# Patient Record
Sex: FEMALE | Race: WHITE | NOT HISPANIC OR LATINO | Employment: PART TIME | URBAN - METROPOLITAN AREA
[De-identification: names, ages, dates, MRNs, and addresses within clinical notes are randomized per-mention and may not be internally consistent; named-entity substitution may affect disease eponyms.]

---

## 2018-01-11 ENCOUNTER — OFFICE VISIT (OUTPATIENT)
Dept: FAMILY MEDICINE | Facility: CLINIC | Age: 33
End: 2018-01-11
Payer: COMMERCIAL

## 2018-01-11 VITALS
OXYGEN SATURATION: 100 % | TEMPERATURE: 98.7 F | SYSTOLIC BLOOD PRESSURE: 138 MMHG | WEIGHT: 266.6 LBS | HEART RATE: 74 BPM | HEIGHT: 67 IN | BODY MASS INDEX: 41.84 KG/M2 | DIASTOLIC BLOOD PRESSURE: 92 MMHG

## 2018-01-11 DIAGNOSIS — R59.0 AXILLARY LYMPHADENOPATHY: Primary | ICD-10-CM

## 2018-01-11 DIAGNOSIS — R20.9 DISTURBANCE OF SKIN SENSATION: ICD-10-CM

## 2018-01-11 PROCEDURE — 99203 OFFICE O/P NEW LOW 30 MIN: CPT | Performed by: NURSE PRACTITIONER

## 2018-01-11 NOTE — MR AVS SNAPSHOT
After Visit Summary   1/11/2018    Kelsea Murray    MRN: [id number]           Patient Information     Date Of Birth          1985        Visit Information        Provider Department      1/11/2018 4:00 PM Geneva Horne APRN CNP [facility]        Today's Diagnoses     Axillary lymphadenopathy    -  1      Care Instructions    Schedule your ultrasound in the next 1-2 weeks and we will discuss the results I receive them.            Follow-ups after your visit        Your next 10 appointments already scheduled     Jan 23, 2018  4:40 PM CST   [facility]YS[facility] with WAN Lacey C[facility]    [address] [address] [id number] [phone]              Future tests that were ordered for you today     Open Future Orders        Priority Expected Expires Ordered    US Breast Bilateral Complete 4 Quadrants Routine  1/11/2019 1/11/2018            Who to contact     If you have questions or need follow up information about today's clinic visit or your schedule please contact [facility] directly at [phone].  Normal or non-critical lab and imaging results will be communicated to you by iSOCOhart, letter or phone within 4 business days after the clinic has received the results. If you do not hear from us within 7 days, please contact the clinic through iSOCOhart or phone. If you have a critical or abnormal lab result, we will notify you by phone as soon as possible.  Submit refill requests through Mimosa or call your pharmacy and they will forward the refill request to us. Please allow 3 business days for your refill to be completed.          Additional Information About Your Visit        Mimosa Information     Mimosa lets you send messages to your doctor, view your test results, renew your prescriptions, schedule appointments and more. To sign up, go to  "[email or web address] . Click on \"Log in\" on the left side of the screen, which will take you to the Welcome page. Then click on \"Sign up Now\" on the right side of the page.     You will be asked to enter the access code listed below, as well as some personal information. Please follow the directions to create your username and password.     Your access code is: [email or web address]  Expires: 2018 10:29 AM     Your access code will  in 90 days. If you need help or a new code, please call your [facility] clinic or [phone].        Care EveryWhere ID     This is your Care EveryWhere ID. This could be used by other organizations to access your [facility] medical records  [id number]        Your Vitals Were     Pulse Temperature Height Last Period Pulse Oximetry BMI (Body Mass Index)    74 98.7  F (37.1  C) (Oral) 5' 7.16\" (1.706 m) 2017 (Within Days) 100% 41.55 kg/m2       Blood Pressure from Last 3 Encounters:   18 (!) 138/92    Weight from Last 3 Encounters:   18 266 lb 9.6 oz (120.9 kg)               Primary Care Provider Fax #    Physician No Ref-Primary [phone]       No address on file        Equal Access to Services     PURVI DELANEY : Abby hoango Soomaali, waaxda luqadaha, qaybta kaalmada ademonico, jayne salazar. So [facility] [phone].    ATENCIÓN: Si habla español, tiene a wick disposición servicios gratuitos de asistencia lingüística. Llame al [phone].    We comply with applicable federal civil rights laws and [place] laws. We do not discriminate on the basis of race, color, national origin, age, disability, sex, sexual orientation, or gender identity.            Thank you!     Thank you for choosing [facility]  for your care. Our goal is always to provide you with excellent care. Hearing back from our patients is one way we can continue to improve our services. Please take a few minutes to complete the written survey that " you may receive in the mail after your visit with us. Thank you!             Your Updated Medication List - Protect others around you: Learn how to safely use, store and throw away your medicines at www.disposemymeds.org.      Notice  As of 1/11/2018  4:12 PM    You have not been prescribed any medications.

## 2018-01-11 NOTE — PROGRESS NOTES
SUBJECTIVE:   Kelsea Murray is a 32 year old female who presents to clinic today for the following health issues:  Concern - left axillary   Onset: 5-6 months    Description:   Left axillary region numbness or almost tingly     Intensity: mild    Progression of Symptoms:  same    Accompanying Signs & Symptoms:  none    Previous history of similar problem:   none    Precipitating factors:   Worsened by: nothing    Alleviating factors:  Improved by: nothing  When was shaving one day noticed that it felt different  Therapies Tried and outcome: nothing    Patient has been busy so did not come in about this.  She is now very worried.  She denies breast lumps, tenderness, or skin changes.  No nipple tenderness or discharge.  No possibility for pregnancy, she is on birth control.  She denies family history of female cancers except for her maternal grandmother who was diagnosed in 70s.  + personal history of anxiety.    Problem list and histories reviewed & adjusted, as indicated.  Additional history: as documented    There is no problem list on file for this patient.    History reviewed. No pertinent surgical history.    Social History   Substance Use Topics     Smoking status: Never Smoker     Smokeless tobacco: Never Used     Alcohol use Yes     Family History   Problem Relation Age of Onset     Breast Cancer Maternal Grandmother      Dx in 70s         No current outpatient prescriptions on file.     No Known Allergies  No lab results found.   BP Readings from Last 3 Encounters:   01/11/18 (!) 138/92    Wt Readings from Last 3 Encounters:   01/11/18 266 lb 9.6 oz (120.9 kg)                  Labs reviewed in EPIC          Reviewed and updated as needed this visit by clinical staff     Reviewed and updated as needed this visit by Provider         ROS:  Constitutional, HEENT, cardiovascular, pulmonary, gi and gu systems are negative, except as otherwise noted.      OBJECTIVE:   BP (!) 138/92 (BP Location: Left arm,  "Patient Position: Chair, Cuff Size: Adult Large)  Pulse 74  Temp 98.7  F (37.1  C) (Oral)  Ht 5' 7.16\" (1.706 m)  Wt 266 lb 9.6 oz (120.9 kg)  LMP 12/05/2017 (Within Days)  SpO2 100%  BMI 41.55 kg/m2  Body mass index is 41.55 kg/(m^2).  GENERAL: healthy, alert and no distress  NECK: no adenopathy, no asymmetry, masses, or scars and thyroid normal to palpation  RESP: lungs clear to auscultation - no rales, rhonchi or wheezes  BREAST: normal without masses, tenderness or nipple discharge and axillary findings: axillary adenopathy on bilateral axillae though more numerous on left (5-6 palpated on left and 2-3 on right).  They are not painful or mobile.  Slightly firm. All about 0.3 cm in diameter.  CV: regular rate and rhythm, normal S1 S2, no S3 or S4, no murmur, click or rub, no peripheral edema and peripheral pulses strong  ABDOMEN: soft, nontender, no hepatosplenomegaly, no masses and bowel sounds normal  MS: no gross musculoskeletal defects noted, no edema  LYMPH: normal ant/post cervical, supraclavicular nodes      Diagnostic Test Results:  none     ASSESSMENT/PLAN:     1. Axillary lymphadenopathy  With numbness, will image.  - US Breast Bilateral Complete 4 Quadrants; Future    2. Disturbance of skin sensation  On left axillae as above.      Patient Instructions   Schedule your ultrasound in the next 1-2 weeks and we will discuss the results I receive them.        WAN Gutiérrez [facility]  "

## 2018-01-15 PROBLEM — R20.9 DISTURBANCE OF SKIN SENSATION: Status: ACTIVE | Noted: 2018-01-15

## 2018-01-15 PROBLEM — E66.01 OBESITY, MORBID, BMI 40.0-49.9 (H): Status: ACTIVE | Noted: 2018-01-15

## 2018-01-15 PROBLEM — R59.0 AXILLARY LYMPHADENOPATHY: Status: ACTIVE | Noted: 2018-01-15

## 2018-01-16 ENCOUNTER — RADIANT APPOINTMENT (OUTPATIENT)
Dept: ULTRASOUND IMAGING | Facility: CLINIC | Age: 33
End: 2018-01-16
Attending: NURSE PRACTITIONER
Payer: COMMERCIAL

## 2018-01-16 ENCOUNTER — RADIANT APPOINTMENT (OUTPATIENT)
Dept: MAMMOGRAPHY | Facility: CLINIC | Age: 33
End: 2018-01-16
Attending: NURSE PRACTITIONER
Payer: COMMERCIAL

## 2018-01-16 DIAGNOSIS — R59.0 AXILLARY LYMPHADENOPATHY: ICD-10-CM

## 2018-01-16 PROCEDURE — 77066 DX MAMMO INCL CAD BI: CPT | Performed by: RADIOLOGY

## 2018-01-16 PROCEDURE — 76642 ULTRASOUND BREAST LIMITED: CPT | Mod: LT | Performed by: RADIOLOGY

## 2018-01-30 ENCOUNTER — OFFICE VISIT (OUTPATIENT)
Dept: FAMILY MEDICINE | Facility: CLINIC | Age: 33
End: 2018-01-30
Payer: COMMERCIAL

## 2018-01-30 VITALS
OXYGEN SATURATION: 100 % | WEIGHT: 266 LBS | SYSTOLIC BLOOD PRESSURE: 124 MMHG | BODY MASS INDEX: 41.75 KG/M2 | DIASTOLIC BLOOD PRESSURE: 88 MMHG | TEMPERATURE: 96.7 F | HEART RATE: 78 BPM | HEIGHT: 67 IN

## 2018-01-30 DIAGNOSIS — M62.838 NECK MUSCLE SPASM: Primary | ICD-10-CM

## 2018-01-30 DIAGNOSIS — E66.01 OBESITY, MORBID, BMI 40.0-49.9 (H): ICD-10-CM

## 2018-01-30 PROCEDURE — 99214 OFFICE O/P EST MOD 30 MIN: CPT | Performed by: PREVENTIVE MEDICINE

## 2018-01-30 RX ORDER — METHOCARBAMOL 500 MG/1
1000 TABLET, FILM COATED ORAL 3 TIMES DAILY PRN
Qty: 30 TABLET | Refills: 1 | Status: SHIPPED | OUTPATIENT
Start: 2018-01-30 | End: 2018-08-14

## 2018-01-30 ASSESSMENT — PAIN SCALES - GENERAL: PAINLEVEL: SEVERE PAIN (6)

## 2018-01-30 NOTE — MR AVS SNAPSHOT
After Visit Summary   1/30/2018    Kelsea Murray    MRN: [id number]           Patient Information     Date Of Birth          1985        Visit Information        Provider Department      1/30/2018 8:00 AM Skylar Coles MD [facility]        Today's Diagnoses     Neck muscle spasm    -  1      Care Instructions    At [facility], we strive to deliver an exceptional experience to you, every time we see you.  If you receive a survey in the mail, please send us back your thoughts. We really do value your feedback.    Based on your medical history, these are the current health maintenance/preventive care services that you are due for (some may have been done at this visit.)  Health Maintenance Due   Topic Date Due     INFLUENZA VACCINE (SYSTEM ASSIGNED)  09/01/2017         Suggested websites for health information:  Www.Chu Shu : Up to date and easily searchable information on multiple topics.  Www.Whiphand.gov : medication info, interactive tutorials, watch real surgeries online  Www.familydoctor.org : good info from the Academy of Family Physicians  Www.cdc.gov : public health info, travel advisories, epidemics (H1N1)  Www.aap.org : children's health info, normal development, vaccinations  Www.health.state[place] : [place] dept of health, public health issues in [place], N1N1    Your care team:                            Family Medicine Internal Medicine   MD Milton White MD Shantel Branch-Fleming, MD Katya Georgiev PA-C Megan Hill, WAN Ferreira MD Pediatrics   DAO Cheng, PEDRO [place] APRN CNP   MD Marie Shah MD Deborah Mielke, MD Kim Thein, APRN [facility]      Clinic hours: Monday - Thursday 7 am-7 pm; Fridays 7 am-5 pm.   Urgent care: Monday - Friday 11 am-9 pm; Saturday and Sunday 9 am-5 pm.  Pharmacy : Monday -Thursday 8 am-8 pm; Friday 8 am-6 pm; Saturday and Sunday 9 am-5 pm.      Clinic: [phone]   Pharmacy: [phone]              Follow-ups after your visit        Additional Services     [facility] PT, HAND, AND CHIROPRACTIC REFERRAL       **This order will print in the [facility] Scheduling Office**    Physical Therapy, Hand Therapy and Chiropractic Care are available through:    *[facility]  *[facility]  *[facility]    Call one number to schedule at any of the above locations: [phone].    Your provider has referred you to: As Indicated:     Indication/Reason for Referral: Neck Pain  Onset of Illness: 2 weeks   Therapy Orders: Evaluate and Treat  Special Programs: None  Special Request: None    Nadia Zuniga      Additional Comments for the Therapist or Chiropractor:     Please be aware that coverage of these services is subject to the terms and limitations of your health insurance plan.  Call member services at your health plan with any benefit or coverage questions.      Please bring the following to your appointment:    *Your personal calendar for scheduling future appointments  *Comfortable clothing                  Who to contact     If you have questions or need follow up information about today's clinic visit or your schedule please contact [facility] directly at [phone].  Normal or non-critical lab and imaging results will be communicated to you by MyChart, letter or phone within 4 business days after the clinic has received the results. If you do not hear from us within 7 days, please contact the clinic through NightstaRxhart or phone. If you have a critical or abnormal lab result, we will notify you by phone as soon as possible.  Submit refill requests through "[employer] or call your pharmacy and they will forward the refill request to us. Please allow 3 business days for your refill to be completed.          Additional Information About Your Visit        NightstaRxharSpottly Information     "[employer] gives you  "secure access to your electronic health record. If you see a primary care provider, you can also send messages to your care team and make appointments. If you have questions, please call your primary care clinic.  If you do not have a primary care provider, please call [phone] and they will assist you.        Care EveryWhere ID     This is your Care EveryWhere ID. This could be used by other organizations to access your [facility] medical records  [id number]        Your Vitals Were     Pulse Temperature Height Last Period Pulse Oximetry Breastfeeding?    78 96.7  F (35.9  C) (Oral) 5' 7\" (1.702 m) 01/29/2018 100% No    BMI (Body Mass Index)                   41.66 kg/m2            Blood Pressure from Last 3 Encounters:   01/30/18 124/88   01/11/18 (!) 138/92    Weight from Last 3 Encounters:   01/30/18 266 lb (120.7 kg)   01/11/18 266 lb 9.6 oz (120.9 kg)              We Performed the Following     [facility] PT, HAND, AND CHIROPRACTIC REFERRAL          Today's Medication Changes          These changes are accurate as of 1/30/18  8:27 AM.  If you have any questions, ask your nurse or doctor.               Start taking these medicines.        Dose/Directions    methocarbamol 500 MG tablet   Commonly known as:  ROBAXIN   Used for:  Neck muscle spasm   Started by:  Skylar Coles MD        Dose:  1000 mg   Take 2 tablets (1,000 mg) by mouth 3 times daily as needed for muscle spasms   Quantity:  30 tablet   Refills:  1            Where to get your medicines      These medications were sent to [facility] - [place], [place] - [address]  [address], [facility] [address]     Phone:  [phone]     methocarbamol 500 MG tablet                Primary Care Provider Fax #    Physician No Ref-Primary [phone]       No address on file        Equal Access to Services     PURVI DELANEY AH: Abby Ceron, doron gudino, qalieztt tionco, jayne perkins " dk vallejo ah. So [facility] [phone].    ATENCIÓN: Si habla semaj, tiene a wick disposición servicios gratuitos de asistencia lingüística. Isis al [phone].    We comply with applicable federal civil rights laws and [place] laws. We do not discriminate on the basis of race, color, national origin, age, disability, sex, sexual orientation, or gender identity.            Thank you!     Thank you for choosing [facility]  for your care. Our goal is always to provide you with excellent care. Hearing back from our patients is one way we can continue to improve our services. Please take a few minutes to complete the written survey that you may receive in the mail after your visit with us. Thank you!             Your Updated Medication List - Protect others around you: Learn how to safely use, store and throw away your medicines at www.disposemymeds.org.          This list is accurate as of 1/30/18  8:27 AM.  Always use your most recent med list.                   Brand Name Dispense Instructions for use Diagnosis    methocarbamol 500 MG tablet    ROBAXIN    30 tablet    Take 2 tablets (1,000 mg) by mouth 3 times daily as needed for muscle spasms    Neck muscle spasm

## 2018-01-30 NOTE — PROGRESS NOTES
SUBJECTIVE:   Kelsea Murray is a 32 year old female who presents to clinic today for the following health issues:      Neck Pain      Duration: x 2 weeks    Description:  Location: left side  Radiation: into the left neck and nto the left shoulder    Intensity:  6/10    Accompanying signs and symptoms: none    History (similar episodes/previous evaluation): None    Precipitating or alleviating factors: None    Therapies tried and outcome: ice heat Tylenol Advil  Started suddenly  No overuse or injury  Localized to left side  More with turning the neck  Can cause headache  Sharp stabbing pain  Certain spots are tender  No fever  No vision changes  No focal weakness   No rash  No past history  No exertional chest pain  No shortness of breath        Obesity:  -no regular exercise     Problem list and histories reviewed & adjusted, as indicated.  Additional history: as documented    Patient Active Problem List   Diagnosis     Axillary lymphadenopathy     Disturbance of skin sensation     Obesity, morbid, BMI 40.0-49.9 (H)     No past surgical history on file.    Social History   Substance Use Topics     Smoking status: Never Smoker     Smokeless tobacco: Never Used     Alcohol use Yes     Family History   Problem Relation Age of Onset     Breast Cancer Maternal Grandmother      Dx in 70s         Current Outpatient Prescriptions   Medication Sig Dispense Refill     methocarbamol (ROBAXIN) 500 MG tablet Take 2 tablets (1,000 mg) by mouth 3 times daily as needed for muscle spasms 30 tablet 1     No Known Allergies  BP Readings from Last 3 Encounters:   01/30/18 124/88   01/11/18 (!) 138/92    Wt Readings from Last 3 Encounters:   01/30/18 266 lb (120.7 kg)   01/11/18 266 lb 9.6 oz (120.9 kg)                  Labs reviewed in EPIC    Reviewed and updated as needed this visit by clinical staff  Allergies  Meds       Reviewed and updated as needed this visit by Provider         ROS:  Constitutional, HEENT, cardiovascular,  "pulmonary, gi and gu systems are negative, except as otherwise noted.    OBJECTIVE:                                                    /88  Pulse 78  Temp 96.7  F (35.9  C) (Oral)  Ht 5' 7\" (1.702 m)  Wt 266 lb (120.7 kg)  LMP 01/29/2018  SpO2 100%  Breastfeeding? No  BMI 41.66 kg/m2  Body mass index is 41.66 kg/(m^2).  GENERAL APPEARANCE: healthy, alert and no distress  EYES: Eyes grossly normal to inspection and conjunctivae and sclerae normal  NECK: no adenopathy, no asymmetry, masses, or scars, trachea midline and normal to palpation and Tender superior to left clavicle   RESP: lungs clear to auscultation - no rales, rhonchi or wheezes  CV: regular rates and rhythm, normal S1 S2, no S3 or S4 and no murmur, click or rub  ABDOMEN: soft, non-tender  MS: extremities normal- no gross deformities noted  SKIN: no suspicious lesions or rashes  NEURO: Normal strength and tone, mentation intact, speech normal, gait normal including heel/toe/tandem walking and normal strength throughout  PSYCH: mentation appears normal    Cervical: No swelling, bruising, erythema atrophy or deformity. Range of motion of the cervical spine is decreased in all directions without focal deficit.  Strength is full.  Distal motor and sensory exam is intact.  Reflexes are 2+ and symmetrical.  No point tenderness over spinous processes. Marked tenderness over left trapezius muscle    Diagnostic test results:  Diagnostic Test Results:  No results found for this or any previous visit (from the past 24 hour(s)).     ASSESSMENT/PLAN:                                                    1. Neck muscle spasm  -Heat application  -Gentle stretching exercises  -Tylenol or Ibuprofen as needed  -sedation side effect of muscle relaxer reviewed   - methocarbamol (ROBAXIN) 500 MG tablet; Take 2 tablets (1,000 mg) by mouth 3 times daily as needed for muscle spasms  Dispense: 30 tablet; Refill: 1  - [facility] PT, HAND, AND CHIROPRACTIC REFERRAL    2. " Obesity, morbid, BMI 40.0-49.9 (H)  -Weight stable  -No exercise     I ended our visit today by discussing the patient's diagnoses and recommended treatment. Please refer to today's diagnoses and orders for further details. I briefly discussed the pathophysiology of these conditions and outlined their expected course. I discussed the warning symptoms and signs that indicate an atypical course that would need urgent or emergent care. I also discussed self care strategies for symptom relief.  Patient voiced complete understanding of plan of care and was in full agreement to proceed. After visit summary discussed and handed to patient.    Common side effects of medications prescribed at this visit were discussed with the patient. Severe side effects, including current applicable black box warnings, were discussed.       Follow up with Provider - If not improving in 2 weeks      Skylar Coles MD MPH    [facility]

## 2018-06-27 ENCOUNTER — HEALTH MAINTENANCE LETTER (OUTPATIENT)
Age: 33
End: 2018-06-27

## 2018-08-13 ENCOUNTER — RADIANT APPOINTMENT (OUTPATIENT)
Dept: GENERAL RADIOLOGY | Facility: CLINIC | Age: 33
End: 2018-08-13
Attending: FAMILY MEDICINE
Payer: COMMERCIAL

## 2018-08-13 ENCOUNTER — OFFICE VISIT (OUTPATIENT)
Dept: ORTHOPEDICS | Facility: CLINIC | Age: 33
End: 2018-08-13
Payer: COMMERCIAL

## 2018-08-13 VITALS
HEIGHT: 67 IN | WEIGHT: 264.3 LBS | BODY MASS INDEX: 41.48 KG/M2 | SYSTOLIC BLOOD PRESSURE: 128 MMHG | DIASTOLIC BLOOD PRESSURE: 90 MMHG

## 2018-08-13 DIAGNOSIS — Z01.818 PREOP GENERAL PHYSICAL EXAM: ICD-10-CM

## 2018-08-13 DIAGNOSIS — M25.571 PAIN IN JOINT INVOLVING RIGHT ANKLE AND FOOT: ICD-10-CM

## 2018-08-13 DIAGNOSIS — S93.431A SYNDESMOTIC DISRUPTION OF RIGHT ANKLE, INITIAL ENCOUNTER: ICD-10-CM

## 2018-08-13 DIAGNOSIS — S82.63XA: ICD-10-CM

## 2018-08-13 DIAGNOSIS — M25.571 PAIN IN JOINT INVOLVING RIGHT ANKLE AND FOOT: Primary | ICD-10-CM

## 2018-08-13 LAB
HCG SERPL QL: NEGATIVE
RADIOLOGIST FLAGS: NORMAL
RADIOLOGIST FLAGS: NORMAL

## 2018-08-13 RX ORDER — OXYCODONE AND ACETAMINOPHEN 5; 325 MG/1; MG/1
1 TABLET ORAL
COMMUNITY
Start: 2018-08-12 | End: 2018-08-14

## 2018-08-13 RX ORDER — IBUPROFEN 600 MG/1
600 TABLET, FILM COATED ORAL PRN
COMMUNITY
Start: 2018-08-12 | End: 2019-06-10

## 2018-08-13 ASSESSMENT — ENCOUNTER SYMPTOMS
HOARSE VOICE: 0
EYE PAIN: 0
ARTHRALGIAS: 0
NECK PAIN: 0
SORE THROAT: 0
EYE IRRITATION: 0
EYE WATERING: 0
NECK MASS: 0
MUSCLE CRAMPS: 0
MUSCLE WEAKNESS: 0
DOUBLE VISION: 0
SINUS PAIN: 0
BACK PAIN: 0
MYALGIAS: 0
TASTE DISTURBANCE: 0
SINUS CONGESTION: 0
JOINT SWELLING: 0
SMELL DISTURBANCE: 0
TROUBLE SWALLOWING: 0
EYE REDNESS: 0
STIFFNESS: 0

## 2018-08-13 NOTE — LETTER
Date:August 14, 2018      Patient was self referred, no letter generated. Do not send.        [facility] Health Information

## 2018-08-13 NOTE — MR AVS SNAPSHOT
After Visit Summary   8/13/2018    Kelsea Murray    MRN: [id number]           Patient Information     Date Of Birth          1985        Visit Information        Provider Department      8/13/2018 11:45 AM Matty Souza MD [facility]        Today's Diagnoses     Pain in joint involving right ankle and foot    -  1    Dupuytren's, fracture, fibula        Syndesmotic disruption of right ankle, initial encounter        Preop general physical exam           Follow-ups after your visit        Your next 10 appointments already scheduled     Aug 13, 2018  1:15 PM CDT   LAB with  LAB   [facility] ([facility])    [address]  [address] [facility] [id number] [phone]           Please do not eat 10-12 hours before your appointment if you are coming in fasting for labs on lipids, cholesterol, or glucose (sugar). This does not apply to pregnant women. Water, hot tea and black coffee (with nothing added) are okay. Do not drink other fluids, diet soda or chew gum.            Aug 14, 2018  7:20 AM CDT   (Arrive by 7:05 AM)   NEW FOOT/ANKLE with Linden He MD   [facility] ([facility])    [address] [place] [address]  [address] [facility] [address] [phone]              Who to contact     Please call your clinic at [phone] to:    Ask questions about your health    Make or cancel appointments    Discuss your medicines    Learn about your test results    Speak to your doctor            Additional Information About Your Visit        MyChart Information     [employer] gives you secure access to your electronic health record. If you see a primary care provider, you can also send messages to your care team and make appointments. If you have questions, please call your primary care clinic.  If you do not have a primary care provider, please call [phone] and they will assist you.       "[employer] is an electronic gateway that provides easy, online access to your medical records. With [employer], you can request a clinic appointment, read your test results, renew a prescription or communicate with your care team.     To access your existing account, please contact your [facility] or call [phone] for assistance.        Care EveryWhere ID     This is your Care EveryWhere ID. This could be used by other organizations to access your [place] medical records  [id number]        Your Vitals Were     Height BMI (Body Mass Index)                5' 7\" (1.702 m) 41.4 kg/m2           Blood Pressure from Last 3 Encounters:   08/13/18 128/90   01/30/18 124/88   01/11/18 (!) 138/92    Weight from Last 3 Encounters:   08/13/18 264 lb 4.8 oz (119.9 kg)   01/30/18 266 lb (120.7 kg)   01/11/18 266 lb 9.6 oz (120.9 kg)              Information about OPIOIDS     PRESCRIPTION OPIOIDS: WHAT YOU NEED TO KNOW   We gave you an opioid (narcotic) pain medicine. It is important to manage your pain, but opioids are not always the best choice. You should first try all the other options your care team gave you. Take this medicine for as short a time (and as few doses) as possible.    Some activities can increase your pain, such as bandage changes or therapy sessions. It may help to take your pain medicine 30 to 60 minutes before these activities. Reduce your stress by getting enough sleep, working on hobbies you enjoy and practicing relaxation or meditation. Talk to your care team about ways to manage your pain beyond prescription opioids.    These medicines have risks:    DO NOT drive when on new or higher doses of pain medicine. These medicines can affect your alertness and reaction times, and you could be arrested for driving under the influence (DUI). If you need to use opioids long-term, talk to your care team about driving.    DO NOT operate heavy machinery    DO NOT do any other dangerous " activities while taking these medicines.    DO NOT drink any alcohol while taking these medicines.     If the opioid prescribed includes acetaminophen, DO NOT take with any other medicines that contain acetaminophen. Read all labels carefully. Look for the word  acetaminophen  or  Tylenol.  Ask your pharmacist if you have questions or are unsure.    You can get addicted to pain medicines, especially if you have a history of addiction (chemical, alcohol or substance dependence). Talk to your care team about ways to reduce this risk.    All opioids tend to cause constipation. Drink plenty of water and eat foods that have a lot of fiber, such as fruits, vegetables, prune juice, apple juice and high-fiber cereal. Take a laxative (Miralax, milk of magnesia, Colace, Senna) if you don t move your bowels at least every other day. Other side effects include upset stomach, sleepiness, dizziness, throwing up, tolerance (needing more of the medicine to have the same effect), physical dependence and slowed breathing.    Store your pills in a secure place, locked if possible. We will not replace any lost or stolen medicine. If you don t finish your medicine, please throw away (dispose) as directed by your pharmacist. The [place] [employer]ution [employer] has more information about safe disposal: https://www.pca.[facility].[place].us/living-green/managing-unwanted-medications         Primary Care Provider Fax #    Physician No Ref-Primary [phone]       No address on file        Equal Access to Services     PURVI DELANEY : Abby rodney Sonico, waaxda luqadaha, qaybta kaalmada alejandrina, jayne salazar. So [facility] [phone].    ATENCIÓN: Si habla español, tiene a wick disposición servicios gratuitos de asistencia lingüística. Llame al [phone].    We comply with applicable federal civil rights laws and [place] laws. We do not discriminate on the basis of race, color, national origin, age,  disability, sex, sexual orientation, or gender identity.            Thank you!     Thank you for choosing [facility]  for your care. Our goal is always to provide you with excellent care. Hearing back from our patients is one way we can continue to improve our services. Please take a few minutes to complete the written survey that you may receive in the mail after your visit with us. Thank you!             Your Updated Medication List - Protect others around you: Learn how to safely use, store and throw away your medicines at www.disposemymeds.org.          This list is accurate as of 8/13/18 12:45 PM.  Always use your most recent med list.                   Brand Name Dispense Instructions for use Diagnosis    ibuprofen 600 MG tablet    ADVIL/MOTRIN     Take 600 mg by mouth        methocarbamol 500 MG tablet    ROBAXIN    30 tablet    Take 2 tablets (1,000 mg) by mouth 3 times daily as needed for muscle spasms    Neck muscle spasm       oxyCODONE-acetaminophen 5-325 MG per tablet    PERCOCET     Take 1 tablet by mouth

## 2018-08-13 NOTE — LETTER
8/13/2018      RE: Kelsea Murray  [address]  [facility] [place] [address]        Subjective:   Kelsea Murray is a 33 year old female who is being seen for a right ankle/foot injury that she sustained at a wedding on 8/11.  She states that she was otherwise in her usual state of health and well until this past Saturday when she was at a wedding with her .  They were walking from the reception to their car and were walking through a grass field because of where they parked.  She slipped on the grass and sustained the injury.  She was seen at the emergency department and had ankle radiographs obtained, and there was concern about a syndesmotic injury when she was sent here for followup.  I do not have access to her original radiographs.  She notes that she has some crepitus or crunching, as she describes it, in her proximal lateral right foreleg.  She states she has been fairly pain-free, as long as she has been immobilized, nonweightbearing and elevating the extremity.  She has been compliant with all instructions.  She has no history of prior surgery and no history of prior exposure to general anesthesia.  She has no family history of coagulopathy or  personal history of DVT or coagulopathy, and no family history of adverse reaction to anesthetic.       Background:   Date of injury: 8/11/18   Duration of symptoms: 3 days  Mechanism of Injury: Acute; Activity Related was walking in an apple or[place] and stepped wrong  Aggravating factors: weightbearing, movement  Relieving Factors: rest and NSAIDs  Prior Evaluation: Prior Physician Evalutation: ED    PAST MEDICAL, SOCIAL, SURGICAL AND FAMILY HISTORY: She  has a past medical history of NO ACTIVE PROBLEMS.  She  has a past surgical history that includes no history of surgery.  Her family history includes Breast Cancer in her maternal grandmother. There is no history of Deep Vein Thrombosis, Bleeding Diathesis, or Anesthesia Reaction.  She reports that she has  "never smoked. She has never used smokeless tobacco. She reports that she drinks alcohol. She reports that she does not use illicit drugs.    ALLERGIES: She has No Known Allergies.    CURRENT MEDICATIONS: She has a current medication list which includes the following prescription(s): ibuprofen, oxycodone-acetaminophen, and methocarbamol.     REVIEW OF SYSTEMS: 12 point review of systems is negative except as noted above.     Exam:   /90  Ht 5' 7\" (1.702 m)  Wt 264 lb 4.8 oz (119.9 kg)  BMI 41.4 kg/m2      CONSTITUTIONAL: healthy, alert and no distress  HEAD: Normocephalic. No masses, lesions, tenderness or abnormalities  CHEST: CTA  CV: RRR without m/r/g. Radial pulses 2+ and symmetric.  ABD: +BS, SNT  BACK: no CVAT  SKIN: no suspicious lesions or rashes  GAIT: wheelchair  NEUROLOGIC: Non-focal  PSYCHIATRIC: affect normal/bright and mentation appears normal.    MUSCULOSKELETAL:   RLE: +TTP over the proximal fibula, ankle is ttp over the deltoid lig/lateral lig/ and syndesmosis with pain with dorsiflex/ER. Achilles nontender and intact. Distal CMS intact.     Assessment/Plan:   (M25.571) Pain in joint involving right ankle and foot  (primary encounter diagnosis)  Plan: X-ray rt ankle G/E 3 views* (RAY), X-ray rt         lower leg    (S82.63XA) Dupuytren's, fracture, fibula  (S93.431A) Syndesmotic disruption of right ankle, initial encounter  Comment: I have referred her to see Dr. He and she will see him tomorrow at 7:20 am to discuss fixation of her syndesmosis. She is placed in a stirrup splint and will continue strict NWB status. She has percocet at home for tonight and tomorrow.    (Z01.818) Preop general physical exam  Comment: Will check qual serum pregnancy test, otherwise she is low risk for a low risk surgical procedure and otherwise cleared for surgical intervention.  Plan: HCG qualitative                  Matty Souza MD    "

## 2018-08-13 NOTE — PROGRESS NOTES
Subjective:   Kelsea Murray is a 33 year old female who is being seen for a right ankle/foot injury that she sustained at a wedding on 8/11.  She states that she was otherwise in her usual state of health and well until this past Saturday when she was at a wedding with her .  They were walking from the reception to their car and were walking through a grass field because of where they parked.  She slipped on the grass and sustained the injury.  She was seen at the emergency department and had ankle radiographs obtained, and there was concern about a syndesmotic injury when she was sent here for followup.  I do not have access to her original radiographs.  She notes that she has some crepitus or crunching, as she describes it, in her proximal lateral right foreleg.  She states she has been fairly pain-free, as long as she has been immobilized, nonweightbearing and elevating the extremity.  She has been compliant with all instructions.  She has no history of prior surgery and no history of prior exposure to general anesthesia.  She has no family history of coagulopathy or  personal history of DVT or coagulopathy, and no family history of adverse reaction to anesthetic.       Background:   Date of injury: 8/11/18   Duration of symptoms: 3 days  Mechanism of Injury: Acute; Activity Related was walking in an apple or[place] and stepped wrong  Aggravating factors: weightbearing, movement  Relieving Factors: rest and NSAIDs  Prior Evaluation: Prior Physician Evalutation: ED    PAST MEDICAL, SOCIAL, SURGICAL AND FAMILY HISTORY: She  has a past medical history of NO ACTIVE PROBLEMS.  She  has a past surgical history that includes no history of surgery.  Her family history includes Breast Cancer in her maternal grandmother. There is no history of Deep Vein Thrombosis, Bleeding Diathesis, or Anesthesia Reaction.  She reports that she has never smoked. She has never used smokeless tobacco. She reports that she drinks  "alcohol. She reports that she does not use illicit drugs.    ALLERGIES: She has No Known Allergies.    CURRENT MEDICATIONS: She has a current medication list which includes the following prescription(s): ibuprofen, oxycodone-acetaminophen, and methocarbamol.     REVIEW OF SYSTEMS: 12 point review of systems is negative except as noted above.     Exam:   /90  Ht 5' 7\" (1.702 m)  Wt 264 lb 4.8 oz (119.9 kg)  BMI 41.4 kg/m2      CONSTITUTIONAL: healthy, alert and no distress  HEAD: Normocephalic. No masses, lesions, tenderness or abnormalities  CHEST: CTA  CV: RRR without m/r/g. Radial pulses 2+ and symmetric.  ABD: +BS, SNT  BACK: no CVAT  SKIN: no suspicious lesions or rashes  GAIT: wheelchair  NEUROLOGIC: Non-focal  PSYCHIATRIC: affect normal/bright and mentation appears normal.    MUSCULOSKELETAL:   RLE: +TTP over the proximal fibula, ankle is ttp over the deltoid lig/lateral lig/ and syndesmosis with pain with dorsiflex/ER. Achilles nontender and intact. Distal CMS intact.     Assessment/Plan:   (M25.571) Pain in joint involving right ankle and foot  (primary encounter diagnosis)  Plan: X-ray rt ankle G/E 3 views* (RAY), X-ray rt         lower leg    (S82.63XA) Dupuytren's, fracture, fibula  (S93.431A) Syndesmotic disruption of right ankle, initial encounter  Comment: I have referred her to see Dr. He and she will see him tomorrow at 7:20 am to discuss fixation of her syndesmosis. She is placed in a stirrup splint and will continue strict NWB status. She has percocet at home for tonight and tomorrow.    (Z01.818) Preop general physical exam  Comment: Will check qual serum pregnancy test, otherwise she is low risk for a low risk surgical procedure and otherwise cleared for surgical intervention.  Plan: HCG qualitative                "

## 2018-08-13 NOTE — TELEPHONE ENCOUNTER
FUTURE VISIT INFORMATION      FUTURE VISIT INFORMATION:    Date: 8/14/18    Time: 0720    Location: ORTHO  REFERRAL INFORMATION:    Referring provider:  DR EHCAVARRIA    Referring providers clinic:  SPORTS [facility]    Reason for visit/diagnosis  R ANKLE      RECORDS REQUESTED FROM:       Clinic name Comments Records Status Imaging Status   MIGUEL ANGEL REQUESTED IMAGES 8/13/18@1320                                     RECORDS STATUS      RECORDS RECEIVED FROM: MIGUEL ANGEL   DATE RECEIVED: 8/13/18   NOTES STATUS DETAILS   OFFICE NOTE from referring provider N/A    OFFICE NOTE from other specialist N/A    DISCHARGE SUMMARY from hospital N/A    DISCHARGE REPORT from the ER Care Everywhere 8/11/18   OPERATIVE REPORT N/A    MEDICATION LIST Care Everywhere    IMPLANT RECORD/STICKER N/A    LABS     CBC/DIFF N/A    CULTURES N/A    INJECTIONS DONE IN RADIOLOGY N/A    MRI N/A    CT SCAN N/A    XRAYS (IMAGES & REPORTS) RECEIVED  8/11/18   TUMOR     PATHOLOGY  Slides & report N/A

## 2018-08-14 ENCOUNTER — DOCUMENTATION ONLY (OUTPATIENT)
Dept: ORTHOPEDICS | Facility: CLINIC | Age: 33
End: 2018-08-14

## 2018-08-14 ENCOUNTER — OFFICE VISIT (OUTPATIENT)
Dept: ORTHOPEDICS | Facility: CLINIC | Age: 33
End: 2018-08-14
Payer: COMMERCIAL

## 2018-08-14 ENCOUNTER — PRE VISIT (OUTPATIENT)
Dept: ORTHOPEDICS | Facility: CLINIC | Age: 33
End: 2018-08-14

## 2018-08-14 DIAGNOSIS — S82.891A ANKLE FRACTURE, RIGHT, CLOSED, INITIAL ENCOUNTER: Primary | ICD-10-CM

## 2018-08-14 RX ORDER — OXYCODONE AND ACETAMINOPHEN 5; 325 MG/1; MG/1
1 TABLET ORAL EVERY 6 HOURS PRN
Qty: 14 TABLET | Refills: 0 | Status: SHIPPED | OUTPATIENT
Start: 2018-08-14 | End: 2018-08-20 | Stop reason: ALTCHOICE

## 2018-08-14 NOTE — LETTER
Return to Work  2018     Seen today: yes    Patient:  Kelsea Murray  :   1985  MRN:     [id number]  Physician: YANELI UGARTEverenice Murray may return to work on Date: 18.        Patient limitations:  Seated work only. May work 4 hours in a 24 hour period.           Electronically signed by Yaneli Ugarte MD

## 2018-08-14 NOTE — NURSING NOTE
Reason For Visit:   Chief Complaint   Patient presents with     Musculoskeletal Problem     Right ankle injury/proximal fibula fx. DOI 8/11/18         Pain Assessment  Patient Currently in Pain: Yes  0-10 Pain Scale: 4  Primary Pain Location: Leg  Pain Orientation: Right, Lower  Pain Descriptors: Throbbing, Sharp  Alleviating Factors: Pain medication, NSAIDS  Aggravating Factors: Movement

## 2018-08-14 NOTE — PROGRESS NOTES
Patient is scheduled for surgery with Dr. He      Spoke or left message with: Padma Canseco RN    Date of Surgery: 8/17/18    Location of Surgery: [facility]     Pre-op with surgeon (if applicable): Completed    H&P: Completed by Dr. Souza on 8/13/18    Post op: 2 weeks & 6 weeks    Special Equipment: Mini C-arm    Additional imaging/appointments: N/A    Surgery packet sent: Received in clinic    Additional comments: Order and demographic sheet faxed to [facility] 8/14/18

## 2018-08-14 NOTE — PROGRESS NOTES
CHIEF COMPLAINT:  Status post right ankle injury with proximal fibula fracture sustained on 08/11/2018.      HISTORY OF PRESENT ILLNESS:  Ms. Murray is a 33-year-old female who presents today for evaluation of her right ankle.  The patient reports to have sustained a fall and accident while slipping on wet grass.  The patient sustained an acute onset of pain.  She was evaluated in acute injury clinic and she was diagnosed with ankle syndesmosis injury with proximal fibula fracture.  The patient has been immobilized and placed in a CAM Walker.  Denies to have any problems on this ankle prior to this injury.        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      It happens to be also that we have fixed her 's foot from a chronic nonunion of the fifth metatarsal.  He reports to be doing quite well.      PAST MEDICAL HISTORY:  Morbid obesity.      PAST SURGICAL HISTORY:  Please refer to encounter form.      DRUG ALLERGIES:  None.      CURRENT MEDICATIONS:  Oxycodone.      PHYSICAL EXAMINATION:  On today's visit, she presents as a pleasant female in no apparent distress with a height of 5 feet 7 inches and a weight of 264 pounds with a BMI of 41.4.      On today's visit, she presents with wrinkleable skin of the right ankle.  Range of motion was not tested secondary to pain.  The patient presents with absence of fracture blisters or damage to the skin.  Forefoot exam is unremarkable.      RADIOGRAPHIC EVALUATION:  Plain x-rays of the ankle were reviewed today which were significant for showing a wide ankle syndesmosis with a wide medial gutter.  Presents with a spiral proximal fibula fracture.      ASSESSMENT:  Right ankle syndesmosis injury with proximal fibula fracture.      I discussed with the patient and her  that at this point we strongly recommend to proceed with an ankle syndesmosis open reduction and internal fixation.  I discussed with her the most likely postoperative course  and complications which include but are not limited to infection, bleeding, nerve damage, residual pain, stiffness and nonunion.      All questions were answered.  The patient was pleased with the discussion.  The patient will schedule surgery at the best of her convenience, which again will consist of a right ankle syndesmosis ORIF.      On today's visit, she was given a handicap parking permit, a workability form and further medication for pain management until the surgical date, which will be in 3 business days from today.      TT 30 minutes, CT 20 minutes.

## 2018-08-14 NOTE — LETTER
8/14/2018       RE: Kelsea Murray  [address]  [facility] [address]     Dear Colleague,    Thank you for referring your patient, Kelsea Murray, to the [facility]. Please see a copy of my visit note below.    CHIEF COMPLAINT:  Status post right ankle injury with proximal fibula fracture sustained on 08/11/2018.      HISTORY OF PRESENT ILLNESS:  Ms. Murray is a 33-year-old female who presents today for evaluation of her right ankle.  The patient reports to have sustained a fall and accident while slipping on wet grass.  The patient sustained an acute onset of pain.  She was evaluated in acute injury clinic and she was diagnosed with ankle syndesmosis injury with proximal fibula fracture.  The patient has been immobilized and placed in a CAM Walker.  Denies to have any problems on this ankle prior to this injury.        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      It happens to be also that we have fixed her 's foot from a chronic nonunion of the fifth metatarsal.  He reports to be doing quite well.      PAST MEDICAL HISTORY:  Morbid obesity.      PAST SURGICAL HISTORY:  Please refer to encounter form.      DRUG ALLERGIES:  None.      CURRENT MEDICATIONS:  Oxycodone.      PHYSICAL EXAMINATION:  On today's visit, she presents as a pleasant female in no apparent distress with a height of 5 feet 7 inches and a weight of 264 pounds with a BMI of 41.4.      On today's visit, she presents with wrinkleable skin of the right ankle.  Range of motion was not tested secondary to pain.  The patient presents with absence of fracture blisters or damage to the skin.  Forefoot exam is unremarkable.      RADIOGRAPHIC EVALUATION:  Plain x-rays of the ankle were reviewed today which were significant for showing a wide ankle syndesmosis with a wide medial gutter.  Presents with a spiral proximal fibula fracture.      ASSESSMENT:  Right  ankle syndesmosis injury with proximal fibula fracture.      I discussed with the patient and her  that at this point we strongly recommend to proceed with an ankle syndesmosis open reduction and internal fixation.  I discussed with her the most likely postoperative course and complications which include but are not limited to infection, bleeding, nerve damage, residual pain, stiffness and nonunion.      All questions were answered.  The patient was pleased with the discussion.  The patient will schedule surgery at the best of her convenience, which again will consist of a right ankle syndesmosis ORIF.      On today's visit, she was given a handicap parking permit, a workability form and further medication for pain management until the surgical date, which will be in 3 business days from today.      TT 30 minutes, CT 20 minutes.         Again, thank you for allowing me to participate in the care of your patient.      Sincerely,    Linden He MD

## 2018-08-14 NOTE — NURSING NOTE
Teaching Flowsheet   Relevant Diagnosis: Right ankle syndesmosis open reduction internal fixation   Teaching Topic: Right ankle syndesmosis instability      Person(s) involved in teaching:   Patient and significant other      Motivation Level:  Asks Questions: Yes  Eager to Learn: Yes  Cooperative: Yes  Receptive (willing/able to accept information): Yes  Any cultural factors/Lutheran beliefs that may influence understanding or compliance? No       Patient demonstrates understanding of the following:  Reason for the appointment, diagnosis and treatment plan: Yes  Knowledge of proper use of medications and conditions for which they are ordered (with special attention to potential side effects or drug interactions): Yes  Which situations necessitate calling provider and whom to contact: Yes       Teaching Concerns Addressed:   Comments: H&P completed yesterday. Negative significant medical history      Proper use and care of  (medical equip, care aids, etc.): Yes  Nutritional needs and diet plan: Yes  Pain management techniques: Yes  Wound Care: Yes  How and/when to access community resources: NA     Instructional Materials Used/Given: pre-op packet, antiseptic soap, TRIA packet, post-operative foot and ankle surgery instructions     Time spent with patient: 15 minutes.

## 2018-08-14 NOTE — LETTER
Date:August 20, 2018      Patient was self referred, no letter generated. Do not send.        [facility] Information

## 2018-08-14 NOTE — MR AVS SNAPSHOT
After Visit Summary   8/14/2018    Kelsea Murray    MRN: [id number]           Patient Information     Date Of Birth          1985        Visit Information        Provider Department      8/14/2018 7:20 AM Linden He MD [facility]        Today's Diagnoses     Ankle fracture, right, closed, initial encounter    -  1       Follow-ups after your visit        Your next 10 appointments already scheduled     Aug 31, 2018  1:00 PM CDT   (Arrive by 12:45 PM)   Post-Op with Marguer[facility] Ortho Nurse   [facility] ([facility])    [address] [place] [id number] [phone]            Oct 02, 2018 12:30 PM CDT   (Arrive by 12:15 PM)   POST-OP FOOT/ANKLE with Linden He MD   [facility] ([facility])    [address] [place] [id number] [phone]              Who to contact     Please call your clinic at [phone] to:    Ask questions about your health    Make or cancel appointments    Discuss your medicines    Learn about your test results    Speak to your doctor            Additional Information About Your Visit        MyChart Information     [employer] gives you secure access to your electronic health record. If you see a primary care provider, you can also send messages to your care team and make appointments. If you have questions, please call your primary care clinic.  If you do not have a primary care provider, please call [phone] and they will assist you.      [employer] is an electronic gateway that provides easy, online access to your medical records. With [employer], you can request a clinic appointment, read your test results, renew a prescription or communicate with your care team.     To access your existing account, please contact your [facility] or call [phone] for assistance.        Care  EveryWhere ID     This is your Care EveryWhere ID. This could be used by other organizations to access your [facility] medical records  [id number]         Blood Pressure from Last 3 Encounters:   08/13/18 128/90   01/30/18 124/88   01/11/18 (!) 138/92    Weight from Last 3 Encounters:   08/13/18 119.9 kg (264 lb 4.8 oz)   01/30/18 120.7 kg (266 lb)   01/11/18 120.9 kg (266 lb 9.6 oz)              We Performed the Following     Briseyda-Operative Worksheet (Neri)          Today's Medication Changes          These changes are accurate as of 8/14/18 11:59 PM.  If you have any questions, ask your nurse or doctor.               Start taking these medicines.        Dose/Directions    order for DME   Started by:  Linden He MD Roll-A-Bout Walker. Patient can use for 2 months   Quantity:  1 Units   Refills:  0         These medicines have changed or have updated prescriptions.        Dose/Directions    oxyCODONE-acetaminophen 5-325 MG per tablet   Commonly known as:  PERCOCET   This may have changed:    - when to take this  - reasons to take this   Used for:  Ankle fracture, right, closed, initial encounter   Changed by:  Linden He MD        Dose:  1 tablet   Take 1 tablet by mouth every 6 hours as needed for pain   Quantity:  14 tablet   Refills:  0         Stop taking these medicines if you haven't already. Please contact your care team if you have questions.     methocarbamol 500 MG tablet   Commonly known as:  ROBAXIN   Stopped by:  Linden He MD                Where to get your medicines      Some of these will need a paper prescription and others can be bought over the counter.  Ask your nurse if you have questions.     Bring a paper prescription for each of these medications     order for DME    oxyCODONE-acetaminophen 5-325 MG per tablet               Information about OPIOIDS     PRESCRIPTION OPIOIDS: WHAT YOU NEED TO KNOW   We gave you an opioid (narcotic) pain medicine.  It is important to manage your pain, but opioids are not always the best choice. You should first try all the other options your care team gave you. Take this medicine for as short a time (and as few doses) as possible.    Some activities can increase your pain, such as bandage changes or therapy sessions. It may help to take your pain medicine 30 to 60 minutes before these activities. Reduce your stress by getting enough sleep, working on hobbies you enjoy and practicing relaxation or meditation. Talk to your care team about ways to manage your pain beyond prescription opioids.    These medicines have risks:    DO NOT drive when on new or higher doses of pain medicine. These medicines can affect your alertness and reaction times, and you could be arrested for driving under the influence (DUI). If you need to use opioids long-term, talk to your care team about driving.    DO NOT operate heavy machinery    DO NOT do any other dangerous activities while taking these medicines.    DO NOT drink any alcohol while taking these medicines.     If the opioid prescribed includes acetaminophen, DO NOT take with any other medicines that contain acetaminophen. Read all labels carefully. Look for the word  acetaminophen  or  Tylenol.  Ask your pharmacist if you have questions or are unsure.    You can get addicted to pain medicines, especially if you have a history of addiction (chemical, alcohol or substance dependence). Talk to your care team about ways to reduce this risk.    All opioids tend to cause constipation. Drink plenty of water and eat foods that have a lot of fiber, such as fruits, vegetables, prune juice, apple juice and high-fiber cereal. Take a laxative (Miralax, milk of magnesia, Colace, Senna) if you don t move your bowels at least every other day. Other side effects include upset stomach, sleepiness, dizziness, throwing up, tolerance (needing more of the medicine to have the same effect), physical dependence  and slowed breathing.    Store your pills in a secure place, locked if possible. We will not replace any lost or stolen medicine. If you don t finish your medicine, please throw away (dispose) as directed by your pharmacist. The [place] [employer]ution [employer] has more information about safe disposal: https://www.pca.[facility].[place].us/living-green/managing-unwanted-medications         Primary Care Provider Fax #    Physician No Ref-Primary [phone]       No address on file        Equal Access to Services     PURVI DELANEY : Hadii aad ku hadasho Soomaali, waaxda luqadaha, qaybta kaalmada adeegyada, waxay idiin hayaan adeeg mannymitzimian lacuate . So [facility] [phone].    ATENCIÓN: Si habla español, tiene a wick disposición servicios gratuitos de asistencia lingüística. [facility] [phone].    We comply with applicable federal civil rights laws and [place] laws. We do not discriminate on the basis of race, color, national origin, age, disability, sex, sexual orientation, or gender identity.            Thank you!     Thank you for choosing [facility]  for your care. Our goal is always to provide you with excellent care. Hearing back from our patients is one way we can continue to improve our services. Please take a few minutes to complete the written survey that you may receive in the mail after your visit with us. Thank you!             Your Updated Medication List - Protect others around you: Learn how to safely use, store and throw away your medicines at www.disposemymeds.org.          This list is accurate as of 8/14/18 11:59 PM.  Always use your most recent med list.                   Brand Name Dispense Instructions for use Diagnosis    ibuprofen 600 MG tablet    ADVIL/MOTRIN     Take 600 mg by mouth        order for DME     1 Units    Roll-A-Bout Walker. Patient can use for 2 months        oxyCODONE-acetaminophen 5-325 MG per tablet    PERCOCET    14 tablet    Take 1 tablet by mouth every 6 hours as  needed for pain    Ankle fracture, right, closed, initial encounter

## 2018-08-17 ENCOUNTER — TRANSFERRED RECORDS (OUTPATIENT)
Dept: HEALTH INFORMATION MANAGEMENT | Facility: CLINIC | Age: 33
End: 2018-08-17

## 2018-08-20 ENCOUNTER — TELEPHONE (OUTPATIENT)
Dept: ORTHOPEDICS | Facility: CLINIC | Age: 33
End: 2018-08-20

## 2018-08-20 DIAGNOSIS — G89.18 POSTOPERATIVE PAIN: Primary | ICD-10-CM

## 2018-08-20 RX ORDER — OXYCODONE HYDROCHLORIDE 5 MG/1
5-10 TABLET ORAL EVERY 4 HOURS PRN
Qty: 25 TABLET | Refills: 0 | Status: SHIPPED | OUTPATIENT
Start: 2018-08-20 | End: 2018-08-23

## 2018-08-20 NOTE — TELEPHONE ENCOUNTER
Patient returned call. She was notified of completed Oxycodone refill and that it was down in patient . She was also notified that [facility] had already sent a refill of her hydroxyzine to her pharmacy so this was not entered today at our clinic. Patient reports that her pain is better today but over the weekend it was worse than before her surgery.  She was educated on proper elevation and icing. She also noted to have some pain in her heel but that she spoke with a nurse at [facility] who stated it was common. She was instructed to call us if the pain worsens or does not improve and we would have her come in for a dressing change. She verbalized understanding and is agreeable with plan. Her dad will come to  her pain medication today.

## 2018-08-20 NOTE — TELEPHONE ENCOUNTER
[facility]    Phone Message    May a detailed message be left on voicemail: no    Reason for Call: Other: Pt calling for a refill of the Oxycodone's and the Hydroxine, pt will pick it up at the lock box.  Please call pt when it's ready     Action Taken: Message routed to:  [facility] Ortho

## 2018-08-22 ENCOUNTER — MYC MEDICAL ADVICE (OUTPATIENT)
Dept: ORTHOPEDICS | Facility: CLINIC | Age: 33
End: 2018-08-22

## 2018-08-23 ENCOUNTER — TELEPHONE (OUTPATIENT)
Dept: ORTHOPEDICS | Facility: CLINIC | Age: 33
End: 2018-08-23

## 2018-08-23 ENCOUNTER — MYC REFILL (OUTPATIENT)
Dept: ORTHOPEDICS | Facility: CLINIC | Age: 33
End: 2018-08-23

## 2018-08-23 DIAGNOSIS — G89.18 POSTOPERATIVE PAIN: ICD-10-CM

## 2018-08-23 NOTE — TELEPHONE ENCOUNTER
[facility]    Phone Message    May a detailed message be left on voicemail: yes    Reason for Call: Symptoms or Concerns     If patient has red-flag symptoms, warm transfer to triage line     Current symptom or concern: PAIN,TENDERNESS, AND WARMTH ON HEAL    Symptoms have been present for:  COUPLE day(s)    Has patient previously been seen for this? No    By : MARGO    Date: 8/17/2018/ FOR SURGERY -  PER NOTES FROM [facility] CAVANAUGH ON 8/20 patient was told to come in for dressing change if pain continued/ I could not find opening till 8/29 and patient already has opening on 8/31    Are there any new or worsening symptoms? Yes: MORE PAINFUL      Action Taken: Message routed to:  [facility] ORTHO

## 2018-08-24 RX ORDER — OXYCODONE HYDROCHLORIDE 5 MG/1
5-10 TABLET ORAL EVERY 4 HOURS PRN
Qty: 25 TABLET | Refills: 0 | Status: SHIPPED | OUTPATIENT
Start: 2018-08-24 | End: 2018-08-31

## 2018-08-24 NOTE — TELEPHONE ENCOUNTER
Discussed sx below with MAO Rouse with Dr. He who said she can come in today to see her and assess the wound. Left VM with patient to call back and be put on the nurse schedule this morning is best, but whenever patient can come in.

## 2018-08-24 NOTE — TELEPHONE ENCOUNTER
Message from MyChart:  Original authorizing provider: WAN Mendes CNP    Kelsea Murray would like a refill of the following medications:  oxyCODONE IR (ROXICODONE) 5 MG tablet [WAN Mendes CNP]    [facility] pharmacy[facility]     Comment:   postop  pt.  refilled Oxycodone.  Pt. Will .  Has postop appts. Scheduled.  S.O./Leana Hernandez RN.

## 2018-08-28 ENCOUNTER — OFFICE VISIT (OUTPATIENT)
Dept: FAMILY MEDICINE | Facility: CLINIC | Age: 33
End: 2018-08-28
Payer: COMMERCIAL

## 2018-08-28 VITALS
OXYGEN SATURATION: 98 % | SYSTOLIC BLOOD PRESSURE: 128 MMHG | DIASTOLIC BLOOD PRESSURE: 82 MMHG | TEMPERATURE: 98.2 F | HEIGHT: 67 IN | HEART RATE: 103 BPM | RESPIRATION RATE: 18 BRPM

## 2018-08-28 DIAGNOSIS — N92.6 MISSED MENSES: ICD-10-CM

## 2018-08-28 DIAGNOSIS — Z12.4 SCREENING FOR MALIGNANT NEOPLASM OF CERVIX: ICD-10-CM

## 2018-08-28 DIAGNOSIS — Z32.01 PREGNANCY CONFIRMED BY POSITIVE URINE TEST: Primary | ICD-10-CM

## 2018-08-28 DIAGNOSIS — Z11.4 SCREENING FOR HIV (HUMAN IMMUNODEFICIENCY VIRUS): ICD-10-CM

## 2018-08-28 LAB — BETA HCG QUAL IFA URINE: POSITIVE

## 2018-08-28 PROCEDURE — 84703 CHORIONIC GONADOTROPIN ASSAY: CPT | Performed by: PHYSICIAN ASSISTANT

## 2018-08-28 PROCEDURE — 99213 OFFICE O/P EST LOW 20 MIN: CPT | Performed by: PHYSICIAN ASSISTANT

## 2018-08-28 RX ORDER — HYDROXYZINE PAMOATE 25 MG/1
CAPSULE ORAL
COMMUNITY
Start: 2018-08-22 | End: 2018-08-31

## 2018-08-28 ASSESSMENT — PAIN SCALES - GENERAL: PAINLEVEL: NO PAIN (0)

## 2018-08-28 NOTE — MR AVS SNAPSHOT
After Visit Summary   8/28/2018    Kelsea Murray    MRN: [id number]           Patient Information     Date Of Birth          1985        Visit Information        Provider Department      8/28/2018 11:20 AM Shiela Coburn PA-C [facility]        Today's Diagnoses     Pregnancy confirmed by positive urine test    -  1    Missed menses        Screening for malignant neoplasm of cervix        Screening for HIV (human immunodeficiency virus)          Care Instructions    WOLFGANG 4/18/19  EGA 6 weeks 5 days  Make appointment with GYN to be seen at 8 weeks   Start taking prenatal vitamin daily   Comfort Tips During Pregnancy  Talk with your healthcare provider before using pain-relieving medicine at any time during your pregnancy.    First trimester tips  Nausea    Get up slowly. Eat a few unsalted crackers before you get out of bed.    Avoid smells that bother you.    Eat small bland low fat, light high-protein meals at frequent intervals.    Sip on water, weak tea, or clear soft drinks, like ginger ale. Eat ice chips.  Fatigue    Take catnaps when you can.    Get regular exercise.    Accept help from others.    Practice good sleep habits, like going to bed and getting up at the same time each day. Use your bed only for sleep and sex.  Mood swings    Talk about your feelings with others, including other mothers.    Limit sugar, chocolate, and caffeine.    Eat a healthy diet. Don t skip meals.    Get regular exercise.  Headaches    Get fresh air and exercise.    Relax and get enough rest.    Check with your healthcare provider before taking any pain medicines.  Second trimester tips  Here are some suggestions to help you cope:    To limit ankle swelling, sit with your feet raised or wear support hose.    If you have pain in your groin and stomach (round ligament pain), avoid sudden twisting movements.    For leg cramps, flexing your foot often brings immediate relief. You  also may try massaging your calf in long, downward strokes, or stretching your legs before going to bed. Get enough exercise and wear shoes with flexible soles.  Third trimester tips  Reducing heartburn    Eat small, light meals throughout the day rather than 3 large ones.    Sleep with your upper body raised 6 inches. Don t lie down until 2 hours after you eat.    Don't eat greasy, fried, or spicy foods.    Avoid citrus fruits and juices.  Treating constipation    Eat foods high in fiber (whole-grain foods, fresh fruit and vegetables).    Drink plenty of water.    Get regular exercise.    Discuss other medicines (like docusate and psyllium) with your healthcare provider.  Taking care of your breasts    Avoid using harsh soaps or alcohol, which can cause excessive dryness.    Wear nursing bras. They provide more support than regular bras and can be used after pregnancy if you breastfeed.  Getting a good night s sleep    Take a warm shower before bed.    Sleep on a firm mattress.    Lie on your side with 1 leg crossed over the other.    Use pillows to support arms, legs, and belly.  Date Last Reviewed: 10/1/2017    7642-7376 [employer]. [address] [place] [address]. All rights reserved. This information is not intended as a substitute for professional medical care. Always follow your healthcare professional's instructions.        Healthy Eating Habits During Pregnancy    It s important to develop healthy eating habits while you are pregnant, for you as well as for your baby. Here are some ways to stay healthy.  Aim for a healthy weight  A slow, steady rate of weight gain is often best. After the first trimester, you may gain about a pound a week. If you were overweight before pregnancy, you need to gain fewer pounds. Your healthcare provider can give you a healthy weight goal for your pregnancy.  Don t diet  Now is not the time to diet. You may not get enough of the nutrients you and your  baby need. Instead, learn how to be a healthy eater. Start by doing it for your baby. Soon, you may do it for yourself.  Vitamins and supplements  Talk with your healthcare provider about taking these and other prenatal vitamins and supplements.    Iron makes the extra blood you need now.    Calcium and vitamin D help build and keep strong bones.    Folic acid helps prevent certain birth defects.    Some vitamins may not be safe to take. Your healthcare provider will tell you which ones to avoid.  Fluids  Drink at least 8 to 10 cups of fluid daily. Your baby needs fluids. Fluids also decrease constipation, flush out toxins and waste, limit swelling, and help prevent bladder infections. Water is best. Other good choices are:    Water or seltzer water with a slice of lemon or lime. (These can also help ease an upset stomach.)    Clear soups that are low in salt    Low-fat or fat-free milk; soy or rice milk with calcium added    100% fruit juices mixed with water    Popsicles or gelatin  Things to avoid  Some things might harm your growing baby. Don t eat or drink:    Alcohol    Unpasteurized dairy foods and juices    Raw or undercooked meat, poultry, fish, or eggs    Prepared meats, like deli meats or hot dogs, unless heated until steaming hot    Fish that are high in mercury, like shark, swordfish, yamila mackerel, tilefish, and albacore tuna   Things to limit  Ask your healthcare provider whether it s safe to eat or drink:    Caffeine    Artificial sweeteners    Organ meats    Certain types of fish    Fish and shellfish that contain mercury in lower amounts, like shrimp, canned light tuna, salmon, pollock, and catfish   Date Last Reviewed: 8/16/2015 2000-2017 [employer]. [address] [place] [address]. All rights reserved. This information is not intended as a substitute for professional medical care. Always follow your healthcare professional's instructions.                Follow-ups  after your visit        Additional Services     OB/GYN REFERRAL       Your provider has referred you to:  [facility]: [facility] [place] [phone]   [email or web address]  [facility]: [facility] [place] [phone]   [email or web address]     Please be aware that coverage of these services is subject to the terms and limitations of your health insurance plan.  Call member services at your health plan with any benefit or coverage questions.      Please bring the following with you to your appointment:    (1) Any X-Rays, CTs or MRIs which have been performed.  Contact the facility where they were done to arrange for  prior to your scheduled appointment.   (2) List of current medications   (3) This referral request   (4) Any documents/labs given to you for this referral                  Your next 10 appointments already scheduled     Aug 31, 2018  1:00 PM CDT   (Arrive by 12:45 PM)   Post-Op with [facility] ([facility])    [address] [place] [address] [phone]              Who to contact     If you have questions or need follow up information about today's clinic visit or your schedule please contact [facility] directly at [phone].  Normal or non-critical lab and imaging results will be communicated to you by MyChart, letter or phone within 4 business days after the clinic has received the results. If you do not hear from us within 7 days, please contact the clinic through MyChart or phone. If you have a critical or abnormal lab result, we will notify you by phone as soon as possible.  Submit refill requests through [employer] or call your pharmacy and they will forward the refill request to us. Please allow 3 business days for your refill to be completed.          Additional Information About  "Your Visit        MyChart Information     [employer] gives you secure access to your electronic health record. If you see a primary care provider, you can also send messages to your care team and make appointments. If you have questions, please call your primary care clinic.  If you do not have a primary care provider, please call [phone] and they will assist you.        Care EveryWhere ID     This is your Care EveryWhere ID. This could be used by other organizations to access your [place] medical records  [id number]        Your Vitals Were     Pulse Temperature Respirations Height Last Period Pulse Oximetry    103 98.2  F (36.8  C) (Oral) 18 5' 7\" (1.702 m) 07/12/2018 (Exact Date) 98%       Blood Pressure from Last 3 Encounters:   08/28/18 128/82   08/13/18 128/90   01/30/18 124/88    Weight from Last 3 Encounters:   08/13/18 264 lb 4.8 oz (119.9 kg)   01/30/18 266 lb (120.7 kg)   01/11/18 266 lb 9.6 oz (120.9 kg)              We Performed the Following     Beta HCG Qual, Urine - [facility] and [place] ([id number])     HCG Quantitative Pregnancy, Blood ([id number])     OB/GYN REFERRAL        Primary Care Provider Fax #    Physician No Ref-Primary [phone]       No address on file        Equal Access to Services     PURVI DELANEY : Hadii kaley ku hadasho Soomaali, waaxda luqadaha, qaybta kaalmada adeegyada, jayne montalvo haycash vallejo . So [facility] [phone].    ATENCIÓN: Si habla español, tiene a wick disposición servicios gratuitos de asistencia lingüística. Llame al [phone].    We comply with applicable federal civil rights laws and [place] laws. We do not discriminate on the basis of race, color, national origin, age, disability, sex, sexual orientation, or gender identity.            Thank you!     Thank you for choosing [facility]  for your care. Our goal is always to provide you with excellent care. Hearing back from our patients is one way we can continue to improve our " services. Please take a few minutes to complete the written survey that you may receive in the mail after your visit with us. Thank you!             Your Updated Medication List - Protect others around you: Learn how to safely use, store and throw away your medicines at www.disposemymeds.org.          This list is accurate as of 8/28/18 12:14 PM.  Always use your most recent med list.                   Brand Name Dispense Instructions for use Diagnosis    hydrOXYzine 25 MG capsule    VISTARIL          ibuprofen 600 MG tablet    ADVIL/MOTRIN     Take 600 mg by mouth        order for DME     1 Units    Roll-A-Bout Walker. Patient can use for 2 months        oxyCODONE IR 5 MG tablet    ROXICODONE    25 tablet    Take 1-2 tablets (5-10 mg) by mouth every 4 hours as needed for severe pain    Postoperative pain       TYLENOL PO

## 2018-08-28 NOTE — PATIENT INSTRUCTIONS
WOLFGANG 4/18/19  EGA 6 weeks 5 days  Make appointment with GYN to be seen at 8 weeks   Start taking prenatal vitamin daily   Comfort Tips During Pregnancy  Talk with your healthcare provider before using pain-relieving medicine at any time during your pregnancy.    First trimester tips  Nausea    Get up slowly. Eat a few unsalted crackers before you get out of bed.    Avoid smells that bother you.    Eat small bland low fat, light high-protein meals at frequent intervals.    Sip on water, weak tea, or clear soft drinks, like ginger ale. Eat ice chips.  Fatigue    Take catnaps when you can.    Get regular exercise.    Accept help from others.    Practice good sleep habits, like going to bed and getting up at the same time each day. Use your bed only for sleep and sex.  Mood swings    Talk about your feelings with others, including other mothers.    Limit sugar, chocolate, and caffeine.    Eat a healthy diet. Don t skip meals.    Get regular exercise.  Headaches    Get fresh air and exercise.    Relax and get enough rest.    Check with your healthcare provider before taking any pain medicines.  Second trimester tips  Here are some suggestions to help you cope:    To limit ankle swelling, sit with your feet raised or wear support hose.    If you have pain in your groin and stomach (round ligament pain), avoid sudden twisting movements.    For leg cramps, flexing your foot often brings immediate relief. You also may try massaging your calf in long, downward strokes, or stretching your legs before going to bed. Get enough exercise and wear shoes with flexible soles.  Third trimester tips  Reducing heartburn    Eat small, light meals throughout the day rather than 3 large ones.    Sleep with your upper body raised 6 inches. Don t lie down until 2 hours after you eat.    Don't eat greasy, fried, or spicy foods.    Avoid citrus fruits and juices.  Treating constipation    Eat foods high in fiber (whole-grain foods, fresh fruit  and vegetables).    Drink plenty of water.    Get regular exercise.    Discuss other medicines (like docusate and psyllium) with your healthcare provider.  Taking care of your breasts    Avoid using harsh soaps or alcohol, which can cause excessive dryness.    Wear nursing bras. They provide more support than regular bras and can be used after pregnancy if you breastfeed.  Getting a good night s sleep    Take a warm shower before bed.    Sleep on a firm mattress.    Lie on your side with 1 leg crossed over the other.    Use pillows to support arms, legs, and belly.  Date Last Reviewed: 10/1/2017    4946-0717 [employer]. [address] [place] [address], [place] [address]. All rights reserved. This information is not intended as a substitute for professional medical care. Always follow your healthcare professional's instructions.        Healthy Eating Habits During Pregnancy    It s important to develop healthy eating habits while you are pregnant, for you as well as for your baby. Here are some ways to stay healthy.  Aim for a healthy weight  A slow, steady rate of weight gain is often best. After the first trimester, you may gain about a pound a week. If you were overweight before pregnancy, you need to gain fewer pounds. Your healthcare provider can give you a healthy weight goal for your pregnancy.  Don t diet  Now is not the time to diet. You may not get enough of the nutrients you and your baby need. Instead, learn how to be a healthy eater. Start by doing it for your baby. Soon, you may do it for yourself.  Vitamins and supplements  Talk with your healthcare provider about taking these and other prenatal vitamins and supplements.    Iron makes the extra blood you need now.    Calcium and vitamin D help build and keep strong bones.    Folic acid helps prevent certain birth defects.    Some vitamins may not be safe to take. Your healthcare provider will tell you which ones to avoid.  Fluids  Drink at least  8 to 10 cups of fluid daily. Your baby needs fluids. Fluids also decrease constipation, flush out toxins and waste, limit swelling, and help prevent bladder infections. Water is best. Other good choices are:    Water or seltzer water with a slice of lemon or lime. (These can also help ease an upset stomach.)    Clear soups that are low in salt    Low-fat or fat-free milk; soy or rice milk with calcium added    100% fruit juices mixed with water    Popsicles or gelatin  Things to avoid  Some things might harm your growing baby. Don t eat or drink:    Alcohol    Unpasteurized dairy foods and juices    Raw or undercooked meat, poultry, fish, or eggs    Prepared meats, like deli meats or hot dogs, unless heated until steaming hot    Fish that are high in mercury, like shark, swordfish, yamila mackerel, tilefish, and albacore tuna   Things to limit  Ask your healthcare provider whether it s safe to eat or drink:    Caffeine    Artificial sweeteners    Organ meats    Certain types of fish    Fish and shellfish that contain mercury in lower amounts, like shrimp, canned light tuna, salmon, pollock, and catfish   Date Last Reviewed: 8/16/2015 2000-2017 [employer]. [address] [place] [address], [place] [address]. All rights reserved. This information is not intended as a substitute for professional medical care. Always follow your healthcare professional's instructions.

## 2018-08-28 NOTE — PROGRESS NOTES
SUBJECTIVE:   Kelsea Murray is a 33 year old female who presents to clinic today for the following health issues:    Patient is here today to confirm pregnancy. Patient is concerned that hydroxyzine is causing a false positive. Before taking medication patient had negative on test but after medication have been getting positive test results.         Problem list and histories reviewed & adjusted, as indicated.  Additional history: as documented    Patient Active Problem List   Diagnosis     Axillary lymphadenopathy     Disturbance of skin sensation     Obesity, morbid, BMI 40.0-49.9 (H)     Past Surgical History:   Procedure Laterality Date     NO HISTORY OF SURGERY         Social History   Substance Use Topics     Smoking status: Never Smoker     Smokeless tobacco: Never Used     Alcohol use Yes     Family History   Problem Relation Age of Onset     Breast Cancer Maternal Grandmother      Dx in 70s     Deep Vein Thrombosis No family hx of      Bleeding Diathesis No family hx of      Anesthesia Reaction No family hx of          Current Outpatient Prescriptions   Medication Sig Dispense Refill     Acetaminophen (TYLENOL PO)        hydrOXYzine (VISTARIL) 25 MG capsule        order for [facility] Roll-A-Bout Walker. Patient can use for 2 months 1 Units 0     oxyCODONE IR (ROXICODONE) 5 MG tablet Take 1-2 tablets (5-10 mg) by mouth every 4 hours as needed for severe pain 25 tablet 0     ibuprofen (ADVIL/MOTRIN) 600 MG tablet Take 600 mg by mouth       No Known Allergies    Reviewed and updated as needed this visit by clinical staff  Tobacco  Allergies  Meds  Med Hx  Surg Hx  Fam Hx  Soc Hx      Reviewed and updated as needed this visit by Provider         ROS:  Constitutional, HEENT, cardiovascular, pulmonary, GI, , musculoskeletal, neuro, skin, endocrine and psych systems are negative, except as otherwise noted.    OBJECTIVE:     /82 (BP Location: Left arm, Patient Position: Sitting, Cuff Size: Adult Large)   "Pulse 103  Temp 98.2  F (36.8  C) (Oral)  Resp 18  Ht 5' 7\" (1.702 m)  LMP 07/12/2018 (Exact Date)  SpO2 98%  There is no height or weight on file to calculate BMI.  GENERAL: healthy, alert and no distress  NECK: no adenopathy, no asymmetry, masses, or scars and thyroid normal to palpation  RESP: lungs clear to auscultation - no rales, rhonchi or wheezes  CV: regular rate and rhythm, normal S1 S2, no S3 or S4, no murmur, click or rub, no peripheral edema and peripheral pulses strong  ABDOMEN: soft, nontender, no hepatosplenomegaly, no masses and bowel sounds normal  MS: no gross musculoskeletal defects noted, no edema    Diagnostic Test Results:  Results for orders placed or performed in visit on 08/28/18 (from the past 24 hour(s))   Beta HCG Qual, Urine - [facility] ([id number])   Result Value Ref Range    Beta HCG Qual IFA Urine Positive (A) NEG^Negative          ASSESSMENT/PLAN:       ICD-10-CM    1. Missed menses N92.6 Beta HCG Qual, Urine - [facility] [place] ([id number])     HCG Quantitative Pregnancy, Blood (TRH298)     CANCELED: Beta HCG Qual, Urine - [facility] [place] ([id number])   2. Screening for malignant neoplasm of cervix Z12.4    3. Screening for HIV (human immunodeficiency virus) Z11.4      WOLFGANG 4/18/19  EGA 6 weeks 5 days  Make appointment with GYN to be seen at 8 weeks   Start taking prenatal vitamin daily       Shiela Coburn PA-C  [facility]  "

## 2018-08-29 ASSESSMENT — ENCOUNTER SYMPTOMS
DECREASED APPETITE: 0
NIGHT SWEATS: 0
FATIGUE: 0
FEVER: 0
HALLUCINATIONS: 0
INCREASED ENERGY: 0
WEIGHT GAIN: 0
POLYPHAGIA: 0
ALTERED TEMPERATURE REGULATION: 0
WEIGHT LOSS: 0
POLYDIPSIA: 0
CHILLS: 0

## 2018-08-31 ENCOUNTER — OFFICE VISIT (OUTPATIENT)
Dept: ORTHOPEDICS | Facility: CLINIC | Age: 33
End: 2018-08-31
Payer: COMMERCIAL

## 2018-08-31 DIAGNOSIS — Z09 SURGICAL FOLLOW-UP CARE: Primary | ICD-10-CM

## 2018-08-31 NOTE — MR AVS SNAPSHOT
After Visit Summary   8/31/2018    Kelsea Murray    MRN: [id number]           Patient Information     Date Of Birth          1985        Visit Information        Provider Department      8/31/2018 1:00 PM Nurse, Marguerite ALCANTAR [facility]        Today's Diagnoses     Surgical follow-up care    -  1       Follow-ups after your visit        Additional Services     PHYSICAL THERAPY REFERRAL (External-Prints)       Physical Therapy Referral                  Your next 10 appointments already scheduled     Sep 10, 2018  8:30 AM CDT   New Prenatal with Vonnie López DO   [facility] ([facility])    [address] [place] [address]   [phone]            Oct 02, 2018 12:30 PM CDT   (Arrive by 12:15 PM)   RETURN FOOT/ANKLE with Linden He MD   [facility] ([facility])    [address] [address] [phone]              Who to contact     Please call your clinic at [phone] to:    Ask questions about your health    Make or cancel appointments    Discuss your medicines    Learn about your test results    Speak to your doctor            Additional Information About Your Visit        MyChart Information     [employer] gives you secure access to your electronic health record. If you see a primary care provider, you can also send messages to your care team and make appointments. If you have questions, please call your primary care clinic.  If you do not have a primary care provider, please call [phone] and they will assist you.      [employer] is an electronic gateway that provides easy, online access to your medical records. With [employer], you can request a clinic appointment, read your test results, renew a prescription or communicate with your care team.     To access your existing account, please contact your [facility]  or call [phone] for assistance.        Care EveryWhere ID     This is your Care EveryWhere ID. This could be used by other organizations to access your [place] medical records  [id number]         Blood Pressure from Last 3 Encounters:   08/28/18 128/82   08/13/18 128/90   01/30/18 124/88    Weight from Last 3 Encounters:   08/13/18 119.9 kg (264 lb 4.8 oz)   01/30/18 120.7 kg (266 lb)   01/11/18 120.9 kg (266 lb 9.6 oz)              We Performed the Following     PHYSICAL THERAPY REFERRAL (External-Prints)        Primary Care Provider Fax #    Physician No Ref-Primary [phone]       No address on file        Equal Access to Services     PURVI DELANEY : Abby Ceron, wacarolynnda terese, qaybta kaalmada alejandrina, jayne vallejo . So [facility] [phone].    ATENCIÓN: Si habla español, tiene a wick disposición servicios gratuitos de asistencia lingüística. Ll[facility] [phone].    We comply with applicable federal civil rights laws and [place] laws. We do not discriminate on the basis of race, color, national origin, age, disability, sex, sexual orientation, or gender identity.            Thank you!     Thank you for choosing [facility]  for your care. Our goal is always to provide you with excellent care. Hearing back from our patients is one way we can continue to improve our services. Please take a few minutes to complete the written survey that you may receive in the mail after your visit with us. Thank you!             Your Updated Medication List - Protect others around you: Learn how to safely use, store and throw away your medicines at www.disposemymeds.org.          This list is accurate as of 8/31/18  1:53 PM.  Always use your most recent med list.                   Brand Name Dispense Instructions for use Diagnosis    hydrOXYzine 25 MG capsule    VISTARIL          ibuprofen 600 MG tablet    ADVIL/MOTRIN     Take 600 mg by mouth        order for  DME     1 Units    Roll-A-Bout Walker. Patient can use for 2 months        oxyCODONE IR 5 MG tablet    ROXICODONE    25 tablet    Take 1-2 tablets (5-10 mg) by mouth every 4 hours as needed for severe pain    Postoperative pain       TYLENOL PO

## 2018-08-31 NOTE — LETTER
Return to Work  2018     Seen today: yes    Patient:  Kelsea Murray  :   1985  MRN:     [id number]  Physician: ANTONINO ALCANTAR ORTHO NURSE    Kelsea Murray may return to work on Date: 18.      The next clinic appointment is scheduled for 10/2/18.    Patient limitations:  Seated work only. Allow for breaks as needed. Must wear cam boot at all times while working. Restrictions in place until re-evaluated in clinic on 10/2/18.             Electronically signed by Linden He

## 2018-08-31 NOTE — PROGRESS NOTES
Reason for visit:    Kelsea Murray came in to the clinic for a two week post op check.    Her surgery was done 8/17/18 by Dr He.  She had Right ankle syndesmosis open reduction internal fixation     Assessment:    Kelsea came into the clinic in post op cam boot Non-WB    The Surgical wounds were exposed and found to be well-healed and without evidence of infection; so the sutures were removed.    She reports to have numbness to her shin and on the top of her foot to her big toe. She states that she is having more feeling in this area than previously and describes it as burning at times and tingling. The skin to the top of her ankle has slight discoloration, this area blanches when pressed, her capillary refill is <3 seconds. She was instructed to continue to monitor these symptoms and let us know she they begin to worsen.     Plan:     She was placed in her cam walker boot.  She was told to begin WBAT as long as she is not having any pain. She was also given physical therapy orders to begin next week.      She has an appointment to see Dr. He in 4 weeks, at that time Dr. He will determine further restrictions.    She has our phone number and will call with questions or problems.      Answers for HPI/ROS submitted by the patient on 8/29/2018   General Symptoms: Yes  Skin Symptoms: No  HENT Symptoms: No  EYE SYMPTOMS: No  HEART SYMPTOMS: No  LUNG SYMPTOMS: No  INTESTINAL SYMPTOMS: No  URINARY SYMPTOMS: No  GYNECOLOGIC SYMPTOMS: No  BREAST SYMPTOMS: No  SKELETAL SYMPTOMS: No  BLOOD SYMPTOMS: No  NERVOUS SYSTEM SYMPTOMS: No  MENTAL HEALTH SYMPTOMS: No  Fever: No  Loss of appetite: No  Weight loss: No  Weight gain: No  Fatigue: No  Night sweats: No  Chills: No  Increased stress: No  Excessive hunger: No  Excessive thirst: No  Feeling hot or cold when others believe the temperature is normal: No  Loss of height: No  Post-operative complications: No  Surgical site pain: Yes  Hallucinations: No  Change in or Loss of  Energy: No  Hyperactivity: No  Confusion: No

## 2018-09-05 ENCOUNTER — TELEPHONE (OUTPATIENT)
Dept: ORTHOPEDICS | Facility: CLINIC | Age: 33
End: 2018-09-05

## 2018-09-05 NOTE — TELEPHONE ENCOUNTER
Patient sent photos of her right heel  via [employer].  She reports she noted the area was black last evening.  She also states the area is numb.  Photos were reviewed with the orthopedic resident.  Recommendation is to schedule an appointment for a wound check with Dr He's nurse tomorrow.  Patient is agreeable with this.   She plans to maintain non-weight bearing status with the right lower extremity.    Patient will delay return to work until the wound has been assessed.  Updated work note will be obtained at the clinic appointment.

## 2018-09-06 ENCOUNTER — ALLIED HEALTH/NURSE VISIT (OUTPATIENT)
Dept: ORTHOPEDICS | Facility: CLINIC | Age: 33
End: 2018-09-06
Payer: COMMERCIAL

## 2018-09-06 DIAGNOSIS — L89.96 PRESSURE INJURY OF DEEP TISSUE: Primary | ICD-10-CM

## 2018-09-06 ASSESSMENT — ENCOUNTER SYMPTOMS
DECREASED APPETITE: 0
SKIN CHANGES: 0
DIZZINESS: 0
CHILLS: 0
DISTURBANCES IN COORDINATION: 0
LOSS OF CONSCIOUSNESS: 0
POLYDIPSIA: 0
MEMORY LOSS: 0
FATIGUE: 1
WEIGHT LOSS: 0
NUMBNESS: 1
POOR WOUND HEALING: 0
PARALYSIS: 0
FEVER: 0
NIGHT SWEATS: 0
HALLUCINATIONS: 0
NAIL CHANGES: 0
SEIZURES: 0
TINGLING: 1
ALTERED TEMPERATURE REGULATION: 0
WEAKNESS: 0
POLYPHAGIA: 0
HEADACHES: 0
TREMORS: 0
WEIGHT GAIN: 0
INCREASED ENERGY: 1

## 2018-09-06 NOTE — PROGRESS NOTES
Patient presented to clinic today for a skin check to her heel on her surgical leg. She noticed yesterday that her skin to her heel was black. Her cam boot and ace wrap was removed and her heel was exposed. Dr. Frazier was consulted and felt that she has a deep tissue injury. This was not open and the patient stated it wasn't as dark today as it was yesterday. He recommended that she wear a Multi Podus Boot to offload pressure to her heel and to be non weightbearing. Patient is agreeable with plan. She was instructed on offloading her heel when resting at home. She was sent over to orthotics to get this boot fit for her today. She will follow up in clinic next Thursday for a repeat skin check. She was told to call us should she have any worsening symptoms and we will have her come in sooner. Patient verbalized understanding.        Answers for HPI/ROS submitted by the patient on 9/6/2018   General Symptoms: Yes  Skin Symptoms: Yes  HENT Symptoms: No  EYE SYMPTOMS: No  HEART SYMPTOMS: No  LUNG SYMPTOMS: No  INTESTINAL SYMPTOMS: No  URINARY SYMPTOMS: No  GYNECOLOGIC SYMPTOMS: No  BREAST SYMPTOMS: No  SKELETAL SYMPTOMS: No  BLOOD SYMPTOMS: No  NERVOUS SYSTEM SYMPTOMS: Yes  MENTAL HEALTH SYMPTOMS: No  Fever: No  Loss of appetite: No  Weight loss: No  Weight gain: No  Fatigue: Yes  Night sweats: No  Chills: No  Increased stress: No  Excessive hunger: No  Excessive thirst: No  Feeling hot or cold when others believe the temperature is normal: No  Loss of height: No  Post-operative complications: Yes  Surgical site pain: Yes  Hallucinations: No  Change in or Loss of Energy: Yes  Hyperactivity: No  Confusion: No  Changes in hair: No  Changes in moles/birth marks: No  Itching: No  Rashes: No  Changes in nails: No  Acne: No  Hair in places you don't want it: No  Change in facial hair: No  Warts: No  Non-healing sores: No  Scarring: No  Flaking of skin: No  Color changes of hands/feet in cold : No  Sun sensitivity: No  Skin  thickening: No  Trouble with coordination: No  Dizziness or trouble with balance: No  Fainting or black-out spells: No  Memory loss: No  Headache: No  Seizures: No  Tingling: Yes  Tremor: No  Weakness: No  Difficulty walking: Yes  Paralysis: No  Numbness: Yes

## 2018-09-06 NOTE — MR AVS SNAPSHOT
After Visit Summary   9/6/2018    Kelsea Murray    MRN: [id number]           Patient Information     Date Of Birth          1985        Visit Information        Provider Department      9/6/2018 2:00 PM Nurse, Marguerite ALCANTAR [facility]        Today's Diagnoses     Pressure injury of deep tissue    -  1       Follow-ups after your visit        Additional Services     ORTHOTICS REFERRAL       **This referral order prints off in the [facility]  (Orthotics & Prosthetics) Central Scheduling Office**    The [facility] Central Scheduling Staff will contact the patient to schedule appointments.     Central Scheduling Contact Information: [phone] ([place])    Orthotics: Multi Podus Boot     Please be aware that coverage of these services is subject to the terms and limitations of your health insurance plan.  Call member services at your health plan with any benefit or coverage questions.      Please bring the following to your appointment:    >>   Any x-rays, CTs or MRIs which have been performed.  Contact the facility where they were done to arrange for  prior to your scheduled appointment.    >>   List of current medications   >>   This referral request   >>   Any documents/labs given to you for this referral                  Your next 10 appointments already scheduled     Sep 10, 2018  8:30 AM CDT   New Prenatal with Vonnie López DO   [facility] ([facility])    [address] [place] [address] [phone]            Oct 02, 2018 12:30 PM CDT   (Arrive by 12:15 PM)   RETURN FOOT/ANKLE with Linden He MD   [facility] ([facility])    [address] [place] [address]  4th [facility] [address] [phone]              Who to contact     Please call your clinic at [phone] to:    Ask questions about your health    Make or cancel  appointments    Discuss your medicines    Learn about your test results    Speak to your doctor            Additional Information About Your Visit        [employer] Information     [employer] gives you secure access to your electronic health record. If you see a primary care provider, you can also send messages to your care team and make appointments. If you have questions, please call your primary care clinic.  If you do not have a primary care provider, please call [phone] and they will assist you.      [employer] is an electronic gateway that provides easy, online access to your medical records. With [employer], you can request a clinic appointment, read your test results, renew a prescription or communicate with your care team.     To access your existing account, please contact your [facility] or call [phone] for assistance.        Care EveryWhere ID     This is your Care EveryWhere ID. This could be used by other organizations to access your [place] medical records  [id number]         Blood Pressure from Last 3 Encounters:   08/28/18 128/82   08/13/18 128/90   01/30/18 124/88    Weight from Last 3 Encounters:   08/13/18 119.9 kg (264 lb 4.8 oz)   01/30/18 120.7 kg (266 lb)   01/11/18 120.9 kg (266 lb 9.6 oz)              We Performed the Following     ORTHOTICS REFERRAL        Primary Care Provider Fax #    Physician No Ref-Primary [phone]       No address on file        Equal Access to Services     PURVI DELANEY : Hadii kaley ku hadasho Sonico, waaxda luqadaha, qaybta kaalmada adetyeshayada, jayne vallejo . So [facility] [phone].    ATENCIÓN: Si habla español, tiene a wick disposición servicios gratuitos de asistencia lingüística. Llame al [phone].    We comply with applicable federal civil rights laws and [place] laws. We do not discriminate on the basis of race, color, national origin, age, disability, sex, sexual orientation, or gender  identity.            Thank you!     Thank you for choosing [facility]  for your care. Our goal is always to provide you with excellent care. Hearing back from our patients is one way we can continue to improve our services. Please take a few minutes to complete the written survey that you may receive in the mail after your visit with us. Thank you!             Your Updated Medication List - Protect others around you: Learn how to safely use, store and throw away your medicines at www.disposemymeds.org.          This list is accurate as of 9/6/18  3:09 PM.  Always use your most recent med list.                   Brand Name Dispense Instructions for use Diagnosis    ibuprofen 600 MG tablet    ADVIL/MOTRIN     Take 600 mg by mouth        order for DME     1 Units    Roll-A-Bout Walker. Patient can use for 2 months        TYLENOL PO

## 2018-09-07 ENCOUNTER — NURSE TRIAGE (OUTPATIENT)
Dept: NURSING | Facility: CLINIC | Age: 33
End: 2018-09-07

## 2018-09-07 NOTE — TELEPHONE ENCOUNTER
"Patient states she is 7-8 weeks pregnant, has her first OB appointment on Monday 9/10.   Has been having intermittent lower abdominal cramping \"the whole time\" \"Mild\".   Last night started passing \"brown wet stringy\" discharge, kind of like clots.\"   Denies fever, dizziness.     Protocol and care advice reviewed.   Caller states understanding of the recommended disposition. Advised to see a provider within 24 hours. Transferred caller to nurse Bright at the [facility] to check on an appointment for today.   Advised to call back if further questions or concerns.       Reason for Disposition    [1] Intermittent lower abdominal pain (e.g., cramping) AND [2] present > 24 hours    Additional Information    Negative: Patient sounds very sick or weak to the triager    Negative: [1] Constant abdominal pain AND [2] present > 2 hours    Negative: [1] Intermittent lower abdominal pain AND [2] present > 24 hours    Negative: [1] Pregnant 24-36 weeks () AND [2] pinkish or brownish mucous discharge    Negative: [1] Yellow or green vaginal discharge AND [2] fever    Negative: Painful rash with tiny water blisters    Negative: [1] Rash (e.g., redness, tiny bumps, sore) of genital area AND [2] present > 24 hours    Negative: Abnormal color vaginal discharge (i.e., yellow, green, gray)    Negative: Bad smelling vaginal discharge    Negative: Shock suspected (e.g., cold/pale/clammy skin, too weak to stand, low BP, rapid pulse)    Negative: Difficult to awaken or acting confused  (e.g., disoriented, slurred speech)    Negative: Passed out (i.e., lost consciousness, collapsed and was not responding)    Negative: Sounds like a life-threatening emergency to the triager    Negative: SEVERE abdominal pain    Negative: [1] SEVERE vaginal bleeding (i.e., soaking 2 pads / hour, large blood clots) AND [2] present 2 or more hours    Negative: [1] MODERATE vaginal bleeding (i.e., soaking 1 pad / hour; clots) AND [2] " present > 6 hours    Negative: [1] MODERATE vaginal bleeding (i.e., soaking 1 pad / hour; clots) AND [2] pregnant > 12 weeks    Negative: Passed tissue (e.g., gray-white)    Negative: Shoulder pain    Negative: Pale skin (pallor) of new onset or worsening    Negative: Patient sounds very sick or weak to the triager    Negative: [1] Constant abdominal pain AND [2] present > 2 hours    Negative: Fever > 100.4 F (38.0 C)    Protocols used: PREGNANCY - VAGINAL BLEEDING LESS THAN 20 WEEKS EGA-ADULT-, PREGNANCY - VAGINAL DISCHARGE-ADULT-AH

## 2018-09-11 ENCOUNTER — TELEPHONE (OUTPATIENT)
Dept: OBGYN | Facility: CLINIC | Age: 33
End: 2018-09-11

## 2018-09-11 NOTE — TELEPHONE ENCOUNTER
Pt calling to request more pain medication.  Suggested alternating between Ibuprofen 600 mg and Tylenol 650-1000 mg (Do NOT exceed 3000 mg in a 24 hour period. Recommended light activity such as walking for bowel activity and to take stool softeners d/t the use of narcotics. Also encouraged plenty of water  ounces a day, protein for proper healing and to get plenty of rest. Pt also requesting a release from work note. Appointment made with Dr. Flores. Americo Bradley RN on 9/11/2018 at 4:43 PM

## 2018-09-11 NOTE — TELEPHONE ENCOUNTER
[facility] Call Center    Phone Message    May a detailed message be left on voicemail: yes    Reason for Call: Patient needs a refill for her pain medication following a surgery/procedure she had on Sunday 09/11/2018 for a tubal pregnancy. Patients  Rodrigue called and asked that we call him back to discuss Rx refill. [phone]    Action Taken: Tranferred to OB/GYN P - [id number]

## 2018-09-12 ENCOUNTER — OFFICE VISIT (OUTPATIENT)
Dept: OBGYN | Facility: CLINIC | Age: 33
End: 2018-09-12
Payer: COMMERCIAL

## 2018-09-12 VITALS
BODY MASS INDEX: 44.06 KG/M2 | DIASTOLIC BLOOD PRESSURE: 93 MMHG | TEMPERATURE: 98.8 F | SYSTOLIC BLOOD PRESSURE: 128 MMHG | OXYGEN SATURATION: 98 % | HEART RATE: 83 BPM | WEIGHT: 281.3 LBS

## 2018-09-12 DIAGNOSIS — G89.18 ACUTE POST-OPERATIVE PAIN: Primary | ICD-10-CM

## 2018-09-12 PROCEDURE — 99024 POSTOP FOLLOW-UP VISIT: CPT | Performed by: OBSTETRICS & GYNECOLOGY

## 2018-09-12 RX ORDER — OXYCODONE HYDROCHLORIDE 5 MG/1
5 TABLET ORAL EVERY 6 HOURS PRN
Qty: 15 TABLET | Refills: 0 | Status: SHIPPED | OUTPATIENT
Start: 2018-09-12 | End: 2018-09-26

## 2018-09-12 NOTE — MR AVS SNAPSHOT
After Visit Summary   9/12/2018    Kelsea Murray    MRN: [id number]           Patient Information     Date Of Birth          1985        Visit Information        Provider Department      9/12/2018 1:30 PM Wilfred Flores MD [facility]        Today's Diagnoses     Acute post-operative pain    -  1      Care Instructions                                                         If you have any questions regarding your visit, Please contact your care team.    [facility] CLINIC HOURS TELEPHONE NUMBER   MD Diana Ng CMA Lisa -    MAO Mcallister       Monday:       7:30-4:30 [place]  Wednesday:       7:30-4:30 [place]  Thursday:       7:30-1:30 [place]  Friday:       7:30-11:30 [facility]  [address] [place], [place]  [id number] [phone] ask for [facility]  [address] [place] [address]  [phone] ask for [facility]    Imaging Scheduling for [place]:  [phone]    Imaging Scheduling for [place]: [phone]       Urgent Care locations:    [facility] Saturday and Sunday   9 am - 5 pm    Monday-Friday   12 pm - 8 pm  Saturday and Sunday   9 am - 5 pm   [phone]     [facility] Labor and Delivery:  [phone]    If you need a medication refill, please contact your pharmacy. Please allow 3 business days for your refill to be completed.  As always, Thank you for trusting us with your healthcare needs!              Follow-ups after your visit        Your next 10 appointments already scheduled     Oct 02, 2018 12:30 PM CDT   (Arrive by 12:15 PM)   RETURN FOOT/ANKLE with Linden He MD   [facility] ([facility] [place])    [address] [place] [id number] [phone]              Who to contact     If you have questions or need follow up  information about today's clinic visit or your schedule please contact [facility] directly at [phone].  Normal or non-critical lab and imaging results will be communicated to you by GlassBoxhart, letter or phone within 4 business days after the clinic has received the results. If you do not hear from us within 7 days, please contact the clinic through GlassBoxhart or phone. If you have a critical or abnormal lab result, we will notify you by phone as soon as possible.  Submit refill requests through Tape TV or call your pharmacy and they will forward the refill request to us. Please allow 3 business days for your refill to be completed.          Additional Information About Your Visit        GlassBoxhart Information     Tape TV gives you secure access to your electronic health record. If you see a primary care provider, you can also send messages to your care team and make appointments. If you have questions, please call your primary care clinic.  If you do not have a primary care provider, please call [phone] and they will assist you.        Care EveryWhere ID     This is your Care EveryWhere ID. This could be used by other organizations to access your [place] medical records  [id number]        Your Vitals Were     Pulse Temperature Last Period Pulse Oximetry BMI (Body Mass Index)       83 98.8  F (37.1  C) (Oral) 07/12/2018 98% 44.06 kg/m2        Blood Pressure from Last 3 Encounters:   09/12/18 (!) 128/93   08/28/18 128/82   08/13/18 128/90    Weight from Last 3 Encounters:   09/12/18 281 lb 4.8 oz (127.6 kg)   08/13/18 264 lb 4.8 oz (119.9 kg)   01/30/18 266 lb (120.7 kg)              Today, you had the following     No orders found for display         Today's Medication Changes          These changes are accurate as of 9/12/18 11:59 PM.  If you have any questions, ask your nurse or doctor.               Start taking these medicines.        Dose/Directions    oxyCODONE IR 5 MG tablet   Commonly  known as:  ROXICODONE   Used for:  Acute post-operative pain   Started by:  Wilfred Flores MD        Dose:  5 mg   Take 1 tablet (5 mg) by mouth every 6 hours as needed for pain   Quantity:  15 tablet   Refills:  0            Where to get your medicines      Some of these will need a paper prescription and others can be bought over the counter.  Ask your nurse if you have questions.     Bring a paper prescription for each of these medications     oxyCODONE IR 5 MG tablet                Primary Care Provider Fax #    Physician No Ref-Primary [phone]       No address on file        Equal Access to Services     [facility]: Hadadonay rodney Sonico, wamarleen gudino, qaybgoldie kaalmafaisal tinoco, jayne vallejo . So [facility] [phone].    ATENCIÓN: Si habla español, tiene a wick disposición servicios gratuitos de asistencia lingüística. [facility] [phone].    We comply with applicable federal civil rights laws and [place] laws. We do not discriminate on the basis of race, color, national origin, age, disability, sex, sexual orientation, or gender identity.            Thank you!     Thank you for choosing [facility]  for your care. Our goal is always to provide you with excellent care. Hearing back from our patients is one way we can continue to improve our services. Please take a few minutes to complete the written survey that you may receive in the mail after your visit with us. Thank you!             Your Updated Medication List - Protect others around you: Learn how to safely use, store and throw away your medicines at www.disposemymeds.org.          This list is accurate as of 9/12/18 11:59 PM.  Always use your most recent med list.                   Brand Name Dispense Instructions for use Diagnosis    ibuprofen 600 MG tablet    ADVIL/MOTRIN     Take 600 mg by mouth        order for DME     1 Units    Roll-A-Bout Walker. Patient can use for 2 months         oxyCODONE IR 5 MG tablet    ROXICODONE    15 tablet    Take 1 tablet (5 mg) by mouth every 6 hours as needed for pain    Acute post-operative pain       TYLENOL PO

## 2018-09-12 NOTE — PROGRESS NOTES
Kelsea presents today for her post-operative check.  Path:  Right fallopian tube, salpingostomy - Ectopic pregnancy  She complains of still having some abdominal pain..    Incisions: Healing well, well approximated, without signs of infection and no drainage    Assessment:   1 week(s) status post laparoscopic salpingostomy.  Plan: Continue increasing activity as tolerated   Return 1-2 weeks.  Wilfred Flores MD FACOG

## 2018-09-12 NOTE — LETTER
[address] [address]  [phone]    Kelsea Murray    To whom it may concern:    Kelsea is under our care since her surgery.  She will be return to work after 9/26 when she is seen for her post-operative follow up.    Thank you.  Wilfred Flores MD FAC  September 12, 2018

## 2018-09-12 NOTE — PATIENT INSTRUCTIONS
If you have any questions regarding your visit, Please contact your care team.    [facility] CLINIC HOURS TELEPHONE NUMBER   MD Diana gN CMA Lisa -    MAO Mcallister       Monday:       7:30-4:30 [place]  Wednesday:       7:30-4:30 [place]  Thursday:       7:30-1:30 [place]  Friday:       7:30-11:30 [facility]  [address] [place]  [address]  [phone] ask for [facility]  [address] [place] [address]  [phone] ask for [facility]    Imaging Scheduling for [place]:  [phone]    Imaging Scheduling for [place]: [phone]       Urgent Care locations:    [facility] Saturday and Sunday   9 am - 5 pm    Monday-Friday   12 pm - 8 pm  Saturday and Sunday   9 am - 5 pm   [phone]     [facility] Labor and Delivery:  [phone]    If you need a medication refill, please contact your pharmacy. Please allow 3 business days for your refill to be completed.  As always, Thank you for trusting us with your healthcare needs!

## 2018-09-20 ENCOUNTER — TELEPHONE (OUTPATIENT)
Dept: ORTHOPEDICS | Facility: CLINIC | Age: 33
End: 2018-09-20

## 2018-09-20 NOTE — TELEPHONE ENCOUNTER
[facility] Call Center    Phone Message    May a detailed message be left on voicemail: yes    Reason for Call: Other: Pt needs to reschedule nurse appt with Padma, please call her back to facilitate.     Action Taken: Message routed to:  [facility] ([facility] Orthopedics

## 2018-09-21 NOTE — TELEPHONE ENCOUNTER
FUTURE VISIT INFORMATION      FUTURE VISIT INFORMATION:    Date: 9/26/18    Time: 0700    Location: ORTHO  REFERRAL INFORMATION:    Referring provider:  N/A    Referring providers clinic:  N/A    Reason for visit/diagnosis  WOUND CHECK R HEEL    RECORDS REQUESTED FROM:       Clinic name Comments Records Status Imaging Status                                         RECORDS STATUS    RECORDS FROM DR UGARTE

## 2018-09-24 ASSESSMENT — ENCOUNTER SYMPTOMS
NAIL CHANGES: 0
SEIZURES: 0
LOSS OF CONSCIOUSNESS: 0
SPEECH CHANGE: 0
WEAKNESS: 0
SKIN CHANGES: 0
TREMORS: 0
HEADACHES: 0
DIZZINESS: 0
POOR WOUND HEALING: 1
NUMBNESS: 1
TINGLING: 1
PARALYSIS: 0
MEMORY LOSS: 0
DISTURBANCES IN COORDINATION: 0

## 2018-09-26 ENCOUNTER — PRE VISIT (OUTPATIENT)
Dept: ORTHOPEDICS | Facility: CLINIC | Age: 33
End: 2018-09-26

## 2018-09-26 ENCOUNTER — OFFICE VISIT (OUTPATIENT)
Dept: ORTHOPEDICS | Facility: CLINIC | Age: 33
End: 2018-09-26
Payer: COMMERCIAL

## 2018-09-26 DIAGNOSIS — S90.821A BLISTER OF RIGHT HEEL, INITIAL ENCOUNTER: Primary | ICD-10-CM

## 2018-09-26 NOTE — NURSING NOTE
Reason For Visit:   Chief Complaint   Patient presents with     Consult     Check skin on right heel. Pt stated that she has constant numbness and intermittent stabbing pain. Pt stated that she isnot sure how to take care of it.       Surgical Followup     Right ankle syndemosis ORIF.        Pain Assessment  Patient Currently in Pain: Yes  Primary Pain Location:  (heel)  Pain Orientation: Right               Current Outpatient Prescriptions   Medication Sig Dispense Refill     Acetaminophen (TYLENOL PO)        ibuprofen (ADVIL/MOTRIN) 600 MG tablet Take 600 mg by mouth       order for DME Roll-A-Bout Walker. Patient can use for 2 months 1 Units 0        No Known Allergies

## 2018-09-26 NOTE — MR AVS SNAPSHOT
After Visit Summary   9/26/2018    Kelsea Murray    MRN: [id number]           Patient Information     Date Of Birth          1985        Visit Information        Provider Department      9/26/2018 7:00 AM Kaykay Vines PA-C [facility]        Today's Diagnoses     Blister of right heel, initial encounter    -  1       Follow-ups after your visit        Your next 10 appointments already scheduled     Sep 26, 2018  3:30 PM CDT   Office Visit with Wilfred Flores MD   [facility] ([facility])    [address] [place] [id number] [phone]           Bring a current list of meds and any records pertaining to this visit. For Physicals, please bring immunization records and any forms needing to be filled out. Please arrive 10 minutes early to complete paperwork.            Oct 02, 2018 12:30 PM CDT   (Arrive by 12:15 PM)   RETURN FOOT/ANKLE with Linden He MD   [facility] ([facility] [place])    [address] [place] [address] [phone]              Who to contact     Please call your clinic at [phone] to:    Ask questions about your health    Make or cancel appointments    Discuss your medicines    Learn about your test results    Speak to your doctor            Additional Information About Your Visit        DIIMEhart Information     Lovli gives you secure access to your electronic health record. If you see a primary care provider, you can also send messages to your care team and make appointments. If you have questions, please call your primary care clinic.  If you do not have a primary care provider, please call [phone] and they will assist you.      Lovli is an electronic gateway that provides easy, online access to your medical records. With Lovli, you can request a clinic appointment, read your test results, renew a prescription or  communicate with your care team.     To access your existing account, please contact your [facility] or call [phone] for assistance.        Care EveryWhere ID     This is your Care EveryWhere ID. This could be used by other organizations to access your [place] medical records  [id number]         Blood Pressure from Last 3 Encounters:   09/12/18 (!) 128/93   08/28/18 128/82   08/13/18 128/90    Weight from Last 3 Encounters:   09/12/18 127.6 kg (281 lb 4.8 oz)   08/13/18 119.9 kg (264 lb 4.8 oz)   01/30/18 120.7 kg (266 lb)              We Performed the Following     DEBRIDEMENT WOUND UP TO 20 SQ CM        Primary Care Provider Fax #    Physician No Ref-Primary [phone]       No address on file        Equal Access to Services     [facility] : Abby rodney Sonico, waaxda luqadaha, qaybta kaalmada alejandrnia, jayne vallejo . So [facility] [phone].    ATENCIÓN: Si habla español, tiene a wick disposición servicios gratuitos de asistencia lingüística. Llame al [phone].    We comply with applicable federal civil rights laws and [place] laws. We do not discriminate on the basis of race, color, national origin, age, disability, sex, sexual orientation, or gender identity.            Thank you!     Thank you for choosing [facility]  for your care. Our goal is always to provide you with excellent care. Hearing back from our patients is one way we can continue to improve our services. Please take a few minutes to complete the written survey that you may receive in the mail after your visit with us. Thank you!             Your Updated Medication List - Protect others around you: Learn how to safely use, store and throw away your medicines at www.disposemymeds.org.          This list is accurate as of 9/26/18  9:26 AM.  Always use your most recent med list.                   Brand Name Dispense Instructions for use Diagnosis     ibuprofen 600 MG tablet    ADVIL/MOTRIN     Take 600 mg by mouth        order for DME     1 Units    Roll-A-Bout Walker. Patient can use for 2 months        TYLENOL PO

## 2018-09-26 NOTE — PROGRESS NOTES
Chief Complaint:   Chief Complaint   Patient presents with     Consult     Check skin on right heel. Pt stated that she has constant numbness and intermittent stabbing pain. Pt stated that she isnot sure how to take care of it.       Surgical Followup     Right ankle syndemosis ORIF.      Subjective: Kelsea is a 33 year old female who presents to the clinic today for evaluation of possible deep tissue injury to right heel following CAM boot use s/p Right ankle syndesmosis ORIF performed 8/17/18. She has been in CAM boot since surgery. At the beginning of September, she noticed her right heel become blackened. It was recommended to begin wearing a multipodus boot and remain NWB with a knee scooter. She has been compliant with this, keeping pressure off of heel at all times. She needs a new work note for additional time off due to NWB. Denies drainage, purulence, redness, odor, pain. Admits numbness to the area, though she admits numbness to the front of the right LE as well ever since surgery.     ROS: Answers for HPI/ROS submitted by the patient on 9/24/2018   General Symptoms: No  Skin Symptoms: Yes  HENT Symptoms: No  EYE SYMPTOMS: No  HEART SYMPTOMS: No  LUNG SYMPTOMS: No  INTESTINAL SYMPTOMS: No  URINARY SYMPTOMS: No  GYNECOLOGIC SYMPTOMS: No  BREAST SYMPTOMS: No  SKELETAL SYMPTOMS: No  BLOOD SYMPTOMS: No  NERVOUS SYSTEM SYMPTOMS: Yes  MENTAL HEALTH SYMPTOMS: No  Changes in hair: No  Changes in moles/birth marks: No  Itching: No  Rashes: No  Changes in nails: No  Acne: No  Hair in places you don't want it: No  Change in facial hair: No  Warts: No  Non-healing sores: Yes  Scarring: No  Flaking of skin: No  Color changes of hands/feet in cold : No  Sun sensitivity: No  Skin thickening: No  Trouble with coordination: No  Dizziness or trouble with balance: No  Fainting or black-out spells: No  Memory loss: No  Headache: No  Seizures: No  Speech problems: No  Tingling: Yes  Tremor: No  Weakness: No  Difficulty  walking: Yes  Paralysis: No  Numbness: Yes      Past Medical History:   Diagnosis Date     NO ACTIVE PROBLEMS      Past Surgical History:   Procedure Laterality Date     NO HISTORY OF SURGERY       Family History   Problem Relation Age of Onset     Breast Cancer Maternal Grandmother      Dx in 70s     Deep Vein Thrombosis No family hx of      Bleeding Diathesis No family hx of      Anesthesia Reaction No family hx of      Social History   Substance Use Topics     Smoking status: Never Smoker     Smokeless tobacco: Never Used     Alcohol use Yes     No Known Allergies  Current Outpatient Rx   Medication Sig Dispense Refill     Acetaminophen (TYLENOL PO)        ibuprofen (ADVIL/MOTRIN) 600 MG tablet Take 600 mg by mouth       order for DME Roll-A-Bout Walker. Patient can use for 2 months 1 Units 0     oxyCODONE IR (ROXICODONE) 5 MG tablet Take 1 tablet (5 mg) by mouth every 6 hours as needed for pain 15 tablet 0       Objective:   There were no vitals taken for this visit.    Only RLE is examined today.  General: Patient is well groomed, appears stated age, is alert, cooperative and in no acute distress.  Vascular: DP and PT pulses are 2/4 bilaterally. LE capillary refill time is <3 seconds. Normal pedal hair growth. Mild LE edema.  Skin: LE skin color, texture and turgor are normal except to plantar surface which is extremely dry. Toenails are normal. There is an area of loosened epidermis to posterior right heel. After deroofing the blister, skin underneath is completely intact. New epidermis. No drainage; very small amount of bleeding with debridement. No tenderness to palpation. No open lesions present. No erythema, edema, calor or odor.    Assessment:   - Pressure blister R heel  - S/p R syndesmosis ORIF    Plan:   - Pt seen and evaluated. Diagnosis and treatment options were discussed.   - All devitalized tissue was excisionally debrided using forceps and #15 blade. No open lesions present underneath, no  drainage, minimal bleeding occurred, no anesthesia necessary.   - Cover area with mepilex type bandage daily for protection.   - Continue multipodus boot and knee scooter for off-loading area at all times. Still at risk for wound formation.  - Composed letter for additional time off of work.   - Moisturized foot and leg daily after showering. Okay to shower normally. Do not scrub the heel.   - RTC as needed, watch for skin breakdown. Follow up with Dr. He as scheduled for further care.

## 2018-09-26 NOTE — LETTER
Kelsea Murray     1985  Encounter date/time:  09/26/18   [id number]    Report of Workability    Physician:  Kaykay Vines PA-C    Accident/Injury Information:  S/p Right Ankle syndesmosis open reduction internal fixation performed on 8/17/18 by Dr. Linden He.     I evaluated patient for possible wound on right heel secondary to pressure from off-loading boot.   Area to R heel is intact, though continues to need off-loading and non-weightbearing in order to heal properly. Kelsea must continue to stay home from work for at least 1 more week because of this.     Anticipated return to work: 10/8/2018    Please contact our office with questions.     Kaykay Vines PA-C

## 2018-09-26 NOTE — LETTER
9/26/2018       RE: Kelsea Murray  [address]  [place] [address]     Dear Colleague,    Thank you for referring your patient, Kelsea Murray, to the [facility] at [facility]. Please see a copy of my visit note below.    Chief Complaint:   Chief Complaint   Patient presents with     Consult     Check skin on right heel. Pt stated that she has constant numbness and intermittent stabbing pain. Pt stated that she isnot sure how to take care of it.       Surgical Followup     Right ankle syndemosis ORIF.      Subjective: Kelsea is a 33 year old female who presents to the clinic today for evaluation of possible deep tissue injury to right heel following CAM boot use s/p Right ankle syndesmosis ORIF performed 8/17/18. She has been in CAM boot since surgery. At the beginning of September, she noticed her right heel become blackened. It was recommended to begin wearing a multipodus boot and remain NWB with a knee scooter. She has been compliant with this, keeping pressure off of heel at all times. She needs a new work note for additional time off due to NWB. Denies drainage, purulence, redness, odor, pain. Admits numbness to the area, though she admits numbness to the front of the right LE as well ever since surgery.       Past Medical History:   Diagnosis Date     NO ACTIVE PROBLEMS      Past Surgical History:   Procedure Laterality Date     NO HISTORY OF SURGERY       Family History   Problem Relation Age of Onset     Breast Cancer Maternal Grandmother      Dx in 70s     Deep Vein Thrombosis No family hx of      Bleeding Diathesis No family hx of      Anesthesia Reaction No family hx of      Social History   Substance Use Topics     Smoking status: Never Smoker     Smokeless tobacco: Never Used     Alcohol use Yes     No Known Allergies  Current Outpatient Rx   Medication Sig Dispense Refill     Acetaminophen (TYLENOL PO)        ibuprofen (ADVIL/MOTRIN) 600  MG tablet Take 600 mg by mouth       order for [facility] [place] Roll-A-Bout Walker. Patient can use for 2 months 1 Units 0     oxyCODONE IR (ROXICODONE) 5 MG tablet Take 1 tablet (5 mg) by mouth every 6 hours as needed for pain 15 tablet 0       Objective:   There were no vitals taken for this visit.    Only RLE is examined today.  General: Patient is well groomed, appears stated age, is alert, cooperative and in no acute distress.  Vascular: DP and PT pulses are 2/4 bilaterally. LE capillary refill time is <3 seconds. Normal pedal hair growth. Mild LE edema.  Skin: LE skin color, texture and turgor are normal except to plantar surface which is extremely dry. Toenails are normal. There is an area of loosened epidermis to posterior right heel. After deroofing the blister, skin underneath is completely intact. New epidermis. No drainage; very small amount of bleeding with debridement. No tenderness to palpation. No open lesions present. No erythema, edema, calor or odor.    Assessment:   - Pressure blister R heel  - S/p R syndesmosis ORIF    Plan:   - Pt seen and evaluated. Diagnosis and treatment options were discussed.   - All devitalized tissue was excisionally debrided using forceps and #15 blade. No open lesions present underneath, no drainage, minimal bleeding occurred, no anesthesia necessary.   - Cover area with mepilex type bandage daily for protection.   - Continue multipodus boot and knee scooter for off-loading area at all times. Still at risk for wound formation.  - Composed letter for additional time off of work.   - Moisturized foot and leg daily after showering. Okay to shower normally. Do not scrub the heel.   - RTC as needed, watch for skin breakdown. Follow up with Dr. He as scheduled for further care.    Kaykay Vines PA-C

## 2018-10-01 ENCOUNTER — MYC MEDICAL ADVICE (OUTPATIENT)
Dept: ORTHOPEDICS | Facility: CLINIC | Age: 33
End: 2018-10-01

## 2018-10-01 DIAGNOSIS — M25.571 PAIN IN JOINT INVOLVING ANKLE AND FOOT, RIGHT: Primary | ICD-10-CM

## 2018-10-01 DIAGNOSIS — Z53.9 ERRONEOUS ENCOUNTER--DISREGARD: Primary | ICD-10-CM

## 2018-10-01 NOTE — TELEPHONE ENCOUNTER
Patient was called back to change appointment time with Dr. He for tomorrow. Pt was rescheduled to 1 p.m.     Sander Finney

## 2018-10-08 DIAGNOSIS — M25.571 PAIN IN JOINT INVOLVING ANKLE AND FOOT, RIGHT: Primary | ICD-10-CM

## 2018-10-09 ENCOUNTER — RADIANT APPOINTMENT (OUTPATIENT)
Dept: GENERAL RADIOLOGY | Facility: CLINIC | Age: 33
End: 2018-10-09
Attending: ORTHOPAEDIC SURGERY
Payer: COMMERCIAL

## 2018-10-09 ENCOUNTER — OFFICE VISIT (OUTPATIENT)
Dept: ORTHOPEDICS | Facility: CLINIC | Age: 33
End: 2018-10-09
Payer: COMMERCIAL

## 2018-10-09 DIAGNOSIS — M25.571 PAIN IN JOINT, ANKLE AND FOOT, RIGHT: Primary | ICD-10-CM

## 2018-10-09 DIAGNOSIS — M25.571 PAIN IN JOINT INVOLVING ANKLE AND FOOT, RIGHT: ICD-10-CM

## 2018-10-09 NOTE — MR AVS SNAPSHOT
After Visit Summary   10/9/2018    Kelsea Murray    MRN: [id number]           Patient Information     Date Of Birth          1985        Visit Information        Provider Department      10/9/2018 1:20 PM Linden He MD [facility]        Today's Diagnoses     Pain in joint, ankle and foot, right    -  1       Follow-ups after your visit        Additional Services     PHYSICAL THERAPY REFERRAL (External-Prints)       Physical Therapy Referral                  Follow-up notes from your care team     Return in about 4 weeks (around 11/6/2018).      Your next 10 appointments already scheduled     Oct 18, 2018  7:40 AM CDT   (Arrive by 7:25 AM)   KADEN Extremity with Sally Nuñez, PT   [facility] ([facility]  )    [address]  [facility] [address] [phone]            Nov 06, 2018  8:00 AM CST   (Arrive by 7:45 AM)   RETURN FOOT/ANKLE with Linden He MD   [facility] ([facility] [place])    [address] [place] [address] [phone]              Future tests that were ordered for you today     Open Future Orders        Priority Expected Expires Ordered    PHYSICAL THERAPY REFERRAL (External-Prints) Routine  10/9/2019 10/9/2018            Who to contact     Please call your clinic at [phone] to:    Ask questions about your health    Make or cancel appointments    Discuss your medicines    Learn about your test results    Speak to your doctor            Additional Information About Your Visit        MyChart Information     "[employer]t gives you secure access to your electronic health record. If you see a primary care provider, you can also send messages to your care team and make appointments. If you have questions, please call your primary care clinic.  If you do not have a primary care provider, please call [phone] and they will assist  you.      [employer] is an electronic gateway that provides easy, online access to your medical records. With [employer], you can request a clinic appointment, read your test results, renew a prescription or communicate with your care team.     To access your existing account, please contact your [facility] or call [phone] for assistance.        Care EveryWhere ID     This is your Care EveryWhere ID. This could be used by other organizations to access your [place] medical records  [id number]         Blood Pressure from Last 3 Encounters:   09/12/18 (!) 128/93   08/28/18 128/82   08/13/18 128/90    Weight from Last 3 Encounters:   09/12/18 127.6 kg (281 lb 4.8 oz)   08/13/18 119.9 kg (264 lb 4.8 oz)   01/30/18 120.7 kg (266 lb)               Primary Care Provider Fax #    Physician No Ref-Primary [phone]       No address on file        Equal Access to Services     PURVI DELANEY AH: Hadii kaley trinidad hadasho Soomaali, waaxda luqadaha, qaybta kaalmada adeegyada, jayne vallejo . So [facility] [phone].    ATENCIÓN: Si habla espsandra, tiene a wick disposición servicios gratuitos de asistencia lingüística. Llame al [phone].    We comply with applicable federal civil rights laws and [place] laws. We do not discriminate on the basis of race, color, national origin, age, disability, sex, sexual orientation, or gender identity.            Thank you!     Thank you for choosing [facility]  for your care. Our goal is always to provide you with excellent care. Hearing back from our patients is one way we can continue to improve our services. Please take a few minutes to complete the written survey that you may receive in the mail after your visit with us. Thank you!             Your Updated Medication List - Protect others around you: Learn how to safely use, store and throw away your medicines at www.disposemymeds.org.          This list is accurate as of  10/9/18 11:59 PM.  Always use your most recent med list.                   Brand Name Dispense Instructions for use Diagnosis    ibuprofen 600 MG tablet    ADVIL/MOTRIN     Take 600 mg by mouth        order for DME     1 Units    Roll-A-Bout Walker. Patient can use for 2 months        TYLENOL PO

## 2018-10-09 NOTE — NURSING NOTE
Reason For Visit:   Chief Complaint   Patient presents with     Right Ankle - Surgical Followup     Right syndesmotic ORIF- DOS 8.27.18 at [facility]    Current Outpatient Prescriptions   Medication     Acetaminophen (TYLENOL PO)     ibuprofen (ADVIL/MOTRIN) 600 MG tablet     order for DME     No current facility-administered medications for this visit.       No Known Allergies    Sanjuanita Osei, ATC

## 2018-10-09 NOTE — LETTER
Date:October 11, 2018      Patient was self referred, no letter generated. Do not send.        [facility] Information

## 2018-10-09 NOTE — LETTER
10/9/2018       RE: Kelsea Murray  [address]  [facility] [address]     Dear Colleague,    Thank you for referring your patient, Kelsea Murray, to the [facility] at [facility]. Please see a copy of my visit note below.    CHIEF COMPLAINT:  Status post right ankle syndesmosis open reduction/internal fixation performed on 08/17/2018.      HISTORY OF PRESENT ILLNESS:  Mrs. Murray presents today for further followup.  Overall, she reports to be doing well.  Reports to be making excellent progress with the healing of her posterior blister.      PHYSICAL EXAMINATION:  On today's visit, she presents with full range of motion of the ankle joint.  CMS intact.  Skin intact.  In fact, she presents with no skin dehiscence along the posterior aspect of the heel.      RADIOGRAPHIC EVALUATION:  Three views of the right ankle were obtained today which were significant for showing a congruent syndesmosis.  Hardware is intact and in place.  Alignment is excellent.      ASSESSMENT:  Status post right ankle syndesmosis open reduction/internal fixation with a pressure ulcer, currently resolved.      PLAN:  Discussed with the patient that at this point our recommendation is to proceed with ambulation with the use of the CAM Walker and regular shoes.  A new prescription for physical therapy was given to the patient to proceed with activity without restrictions.      The patient will follow up in 4 weeks from today.  At that time, plain x-rays of the ankle will be obtained.  Based on those findings, further recommendation will be given to the patient.      We will defer caring for the ulceration to Dr. Frazier which I do not think that it will require any further care.         Again, thank you for allowing me to participate in the care of your patient.      Sincerely,    Linden He MD

## 2018-10-09 NOTE — PROGRESS NOTES
CHIEF COMPLAINT:  Status post right ankle syndesmosis open reduction/internal fixation performed on 08/17/2018.      HISTORY OF PRESENT ILLNESS:  Mrs. Murray presents today for further followup.  Overall, she reports to be doing well.  Reports to be making excellent progress with the healing of her posterior blister.      PHYSICAL EXAMINATION:  On today's visit, she presents with full range of motion of the ankle joint.  CMS intact.  Skin intact.  In fact, she presents with no skin dehiscence along the posterior aspect of the heel.      RADIOGRAPHIC EVALUATION:  Three views of the right ankle were obtained today which were significant for showing a congruent syndesmosis.  Hardware is intact and in place.  Alignment is excellent.      ASSESSMENT:  Status post right ankle syndesmosis open reduction/internal fixation with a pressure ulcer, currently resolved.      PLAN:  Discussed with the patient that at this point our recommendation is to proceed with ambulation with the use of the CAM Walker and regular shoes.  A new prescription for physical therapy was given to the patient to proceed with activity without restrictions.      The patient will follow up in 4 weeks from today.  At that time, plain x-rays of the ankle will be obtained.  Based on those findings, further recommendation will be given to the patient.      We will defer caring for the ulceration to Dr. Frazier which I do not think that it will require any further care.

## 2018-10-12 ENCOUNTER — MYC MEDICAL ADVICE (OUTPATIENT)
Dept: ORTHOPEDICS | Facility: CLINIC | Age: 33
End: 2018-10-12

## 2018-10-26 ENCOUNTER — OFFICE VISIT (OUTPATIENT)
Dept: WOUND CARE | Facility: CLINIC | Age: 33
End: 2018-10-26
Payer: COMMERCIAL

## 2018-10-26 DIAGNOSIS — S90.821A BLISTER OF RIGHT HEEL, INITIAL ENCOUNTER: Primary | ICD-10-CM

## 2018-10-26 ASSESSMENT — PAIN SCALES - GENERAL: PAINLEVEL: MODERATE PAIN (4)

## 2018-10-26 NOTE — PROGRESS NOTES
Chief Complaint:   Chief Complaint   Patient presents with     WOUND CARE     Pt here for wound care       Subjective: Kelsea is a 33 year old female who presents to the clinic today for follow up of right heel blistering and pain. Status post right ankle syndesmosis open reduction internal fixation 8/17/2018. She had her post-op appointment with Dr. He on 10/9. At this time, he recommended that she may continue to WBAT in CAM boot. She continues PT without restrictions. Follow up scheduled for 11/6. She followed these instructions and subsequently developed pain in the heel and medial ankle when walking. She also noticed new blisters and redness with yellow drainage to the heel at site of previous blister.  Today, there is no drainage and it appears to be healed. Still admits heel pain and redness to medial posterior portion of R calcaneus that occurs only with weightbearing.     ROS: Denies rashes, fever, chills, open lesions, purulent drainage.     No Known Allergies  Current Outpatient Rx   Medication Sig Dispense Refill     Acetaminophen (TYLENOL PO)        ibuprofen (ADVIL/MOTRIN) 600 MG tablet Take 600 mg by mouth       order for DME Roll-A-Bout Walker. Patient can use for 2 months 1 Units 0       Objective:   There were no vitals taken for this visit.    General: Patient is well groomed, appears stated age, is alert and cooperative, and is in no acute distress.  Skin: LE skin color, texture and turgor are normal except for mild erythema to posterior heel. There is a friction blister to posterior heel that has since completely scabbed over and healed. No drainage. Mild tenderness to palpation to blister site. Hyperkeratotic tissue build up to medial plantar R heel.     Right ankle x-ray 10/9/18    IMPRESSION: New postsurgical changes status post syndesmotic repair in  the right ankle with new hardware in place.     ROME RAMIREZ MD    Assessment:   - R heel pain and blister secondary to friction and  pressure from AirCast  - Status post right ankle syndesmosis open reduction internal fixation 8/17/2018    Plan:   - No open lesion present today. No signs of infection.   - Added padding to heel and medial portion of AirCast. Did not create significant heel lift by doing so.  - Ok to keep blistering site covered with an additional padded bandage.   - RTC as needed. Message with concerns. F/u with Dr. He as scheduled.

## 2018-10-26 NOTE — LETTER
Date:October 30, 2018      Patient was self referred, no letter generated. Do not send.        [facility] Information

## 2018-10-26 NOTE — LETTER
10/26/2018       RE: Kelsea Murray  [address]  [facility] [address]     Dear Colleague,    Thank you for referring your patient, Kelsea Murray, to the [facility] at [facility]. Please see a copy of my visit note below.    Chief Complaint:   Chief Complaint   Patient presents with     WOUND CARE     Pt here for wound care       Subjective: Kelsea is a 33 year old female who presents to the clinic today for follow up of right heel blistering and pain. Status post right ankle syndesmosis open reduction internal fixation 8/17/2018. She had her post-op appointment with Dr. He on 10/9. At this time, he recommended that she may continue to WBAT in CAM boot. She continues PT without restrictions. Follow up scheduled for 11/6. She followed these instructions and subsequently developed pain in the heel and medial ankle when walking. She also noticed new blisters and redness with yellow drainage to the heel at site of previous blister.  Today, there is no drainage and it appears to be healed. Still admits heel pain and redness to medial posterior portion of R calcaneus that occurs only with weightbearing.     ROS: Denies rashes, fever, chills, open lesions, purulent drainage.     No Known Allergies  Current Outpatient Rx   Medication Sig Dispense Refill     Acetaminophen (TYLENOL PO)        ibuprofen (ADVIL/MOTRIN) 600 MG tablet Take 600 mg by mouth       order for DME Roll-A-Bout Walker. Patient can use for 2 months 1 Units 0       Objective:   There were no vitals taken for this visit.    General: Patient is well groomed, appears stated age, is alert and cooperative, and is in no acute distress.  Skin: LE skin color, texture and turgor are normal except for mild erythema to posterior heel. There is a friction blister to posterior heel that has since completely scabbed over and healed. No drainage. Mild tenderness to palpation to blister site. Hyperkeratotic tissue  build up to medial plantar R heel.     Right ankle x-ray 10/9/18    IMPRESSION: New postsurgical changes status post syndesmotic repair in  the right ankle with new hardware in place.     ROME RAMIREZ MD    Assessment:   - R heel pain and blister secondary to friction and pressure from AirCast  - Status post right ankle syndesmosis open reduction internal fixation 8/17/2018    Plan:   - No open lesion present today. No signs of infection.   - Added padding to heel and medial portion of AirCast. Did not create significant heel lift by doing so.  - Ok to keep blistering site covered with an additional padded bandage.   - RTC as needed. Message with concerns. F/u with Dr. He as scheduled.       Again, thank you for allowing me to participate in the care of your patient.      Sincerely,    Kaykay Vines PA-C

## 2018-10-26 NOTE — MR AVS SNAPSHOT
After Visit Summary   10/26/2018    Kelsea Murray    MRN: [id number]           Patient Information     Date Of Birth          1985        Visit Information        Provider Department      10/26/2018 2:30 PM Kaykay Vines PA-C [facility]        Today's Diagnoses     Blister of right heel, initial encounter    -  1       Follow-ups after your visit        Your next 10 appointments already scheduled     Nov 06, 2018  8:00 AM CST   (Arrive by 7:45 AM)   RETURN FOOT/ANKLE with Linden He MD   [facility] ([facility] [place])    [address]  [address] [facility] [id number] [phone]            Nov 07, 2018  8:30 AM CST   MyChart OB-GYN Return with Wilfred Flores MD   [facility] ([facility])    [address] [address] [place] [address] [phone]              Who to contact     Please call your clinic at [phone] to:    Ask questions about your health    Make or cancel appointments    Discuss your medicines    Learn about your test results    Speak to your doctor            Additional Information About Your Visit        MyChart Information     [employer] gives you secure access to your electronic health record. If you see a primary care provider, you can also send messages to your care team and make appointments. If you have questions, please call your primary care clinic.  If you do not have a primary care provider, please call [phone] and they will assist you.      [facility] is an electronic gateway that provides easy, online access to your medical records. With [facility], you can request a clinic appointment, read your test results, renew a prescription or communicate with your care team.     To access your existing account, please contact your [facility] or call [phone] for assistance.        Care EveryWhere ID     This is your Care EveryWhere  ID. This could be used by other organizations to access your [place] medical records  [id number]         Blood Pressure from Last 3 Encounters:   09/12/18 (!) 128/93   08/28/18 128/82   08/13/18 128/90    Weight from Last 3 Encounters:   09/12/18 281 lb 4.8 oz   08/13/18 264 lb 4.8 oz   01/30/18 266 lb              Today, you had the following     No orders found for display       Primary Care Provider Fax #    Physician No Ref-Primary [phone]       No address on file        Equal Access to Services     [facility]: Hadii kaley rodney Sonico, waaxda luqadaha, qaybta kaalmada alejandrina, jayne vallejo . So [facility] [phone].    ATENCIÓN: Si habla español, tiene a wick disposición servicios gratuitos de asistencia lingüística. [facility] [phone].    We comply with applicable federal civil rights laws and [place] laws. We do not discriminate on the basis of race, color, national origin, age, disability, sex, sexual orientation, or gender identity.            Thank you!     Thank you for choosing [facility]  for your care. Our goal is always to provide you with excellent care. Hearing back from our patients is one way we can continue to improve our services. Please take a few minutes to complete the written survey that you may receive in the mail after your visit with us. Thank you!             Your Updated Medication List - Protect others around you: Learn how to safely use, store and throw away your medicines at www.disposemymeds.org.          This list is accurate as of 10/26/18  3:23 PM.  Always use your most recent med list.                   Brand Name Dispense Instructions for use Diagnosis    ibuprofen 600 MG tablet    ADVIL/MOTRIN     Take 600 mg by mouth        order for DME     1 Units    Roll-A-Bout Walker. Patient can use for 2 months        TYLENOL PO

## 2018-10-26 NOTE — NURSING NOTE
Chief Complaint   Patient presents with     WOUND CARE     Pt here for wound care       There were no vitals filed for this visit.    There is no height or weight on file to calculate BMI.      SEA Hardin, EMT                    No vitals taken per provider

## 2018-11-23 DIAGNOSIS — M25.571 RIGHT ANKLE PAIN: Primary | ICD-10-CM

## 2018-12-04 ENCOUNTER — RADIANT APPOINTMENT (OUTPATIENT)
Dept: GENERAL RADIOLOGY | Facility: CLINIC | Age: 33
End: 2018-12-04
Attending: ORTHOPAEDIC SURGERY
Payer: COMMERCIAL

## 2018-12-04 ENCOUNTER — OFFICE VISIT (OUTPATIENT)
Dept: ORTHOPEDICS | Facility: CLINIC | Age: 33
End: 2018-12-04
Payer: COMMERCIAL

## 2018-12-04 DIAGNOSIS — M25.571 PAIN IN JOINT INVOLVING ANKLE AND FOOT, RIGHT: Primary | ICD-10-CM

## 2018-12-04 DIAGNOSIS — M25.571 RIGHT ANKLE PAIN: ICD-10-CM

## 2018-12-04 NOTE — LETTER
Date:December 10, 2018      Patient was self referred, no letter generated. Do not send.        [facility] Information

## 2018-12-04 NOTE — LETTER
12/4/2018       RE: Kelsea Murray  [address]  [place] [address]     Dear Colleague,    Thank you for referring your patient, Kelsea Murray, to the [facility] at [facility]. Please see a copy of my visit note below.    CHIEF COMPLAINT:  Status post right ankle syndesmosis open reduction and internal fixation performed on 08/17/2018.      HISTORY OF PRESENT ILLNESS:  Ms. Murray presents today for further followup.  Denies to have any problems or complaints.  Reports just to have some stiffness.  Otherwise, reports to be doing well.      PHYSICAL EXAMINATION:  On today's visit, she presents with full range of motion of the right ankle, hindfoot and midfoot joints.  CMS intact.  Skin intact.  There are no signs of infection.  There is no effusion.      RADIOGRAPHIC EVALUATION:  Three views of the right ankle were obtained today which were significant for showing a congruent ankle syndesmosis with hardware intact and in place.      ASSESSMENT:  Status post right ankle syndesmosis open reduction and internal fixation.      PLAN:  I discussed with the patient she is making excellent progress.  She will proceed with activity as tolerated.  She has no activity restrictions.      A new prescription for physical therapy was given to the patient to return to sports.      The patient was instructed to follow up with us in 3 months from now if she is not happy with the function of her ankle joint.      All questions were answered.  The patient was pleased with the discussion.      TT 15 minutes, CT 10 minutes.         Again, thank you for allowing me to participate in the care of your patient.      Sincerely,    Linden He MD

## 2018-12-04 NOTE — PROGRESS NOTES
CHIEF COMPLAINT:  Status post right ankle syndesmosis open reduction and internal fixation performed on 08/17/2018.      HISTORY OF PRESENT ILLNESS:  Ms. Murray presents today for further followup.  Denies to have any problems or complaints.  Reports just to have some stiffness.  Otherwise, reports to be doing well.      PHYSICAL EXAMINATION:  On today's visit, she presents with full range of motion of the right ankle, hindfoot and midfoot joints.  CMS intact.  Skin intact.  There are no signs of infection.  There is no effusion.      RADIOGRAPHIC EVALUATION:  Three views of the right ankle were obtained today which were significant for showing a congruent ankle syndesmosis with hardware intact and in place.      ASSESSMENT:  Status post right ankle syndesmosis open reduction and internal fixation.      PLAN:  I discussed with the patient she is making excellent progress.  She will proceed with activity as tolerated.  She has no activity restrictions.      A new prescription for physical therapy was given to the patient to return to sports.      The patient was instructed to follow up with us in 3 months from now if she is not happy with the function of her ankle joint.      All questions were answered.  The patient was pleased with the discussion.      TT 15 minutes, CT 10 minutes.

## 2018-12-04 NOTE — NURSING NOTE
Reason For Visit:   Chief Complaint   Patient presents with     RECHECK     10 week post op syndesmotic ORIF       There were no vitals taken for this visit.    Pain Assessment  Patient Currently in Pain: Yes  0-10 Pain Scale: 3  Primary Pain Location: Ankle  Pain Orientation: Right  Pain Descriptors: Sharp  Alleviating Factors: Rest  Aggravating Factors: Walking    Kaila Mary ATC

## 2018-12-04 NOTE — MR AVS SNAPSHOT
After Visit Summary   12/4/2018    Kelsea Murray    MRN: [id number]           Patient Information     Date Of Birth          1985        Visit Information        Provider Department      12/4/2018 8:00 AM [facility]        Today's Diagnoses     Pain in joint involving ankle and foot, right    -  1       Follow-ups after your visit        Additional Services     PHYSICAL THERAPY REFERRAL (External-Prints)       Physical Therapy Referral                  Your next 10 appointments already scheduled     Dec 19, 2018  8:00 AM CST   [facility] Return with Wilfred Flores MD   [facility] ([facility])    [address] [place] [id number] [phone]              Who to contact     Please call your clinic at [phone] to:    Ask questions about your health    Make or cancel appointments    Discuss your medicines    Learn about your test results    Speak to your doctor            Additional Information About Your Visit        MyChart Information     [employer] gives you secure access to your electronic health record. If you see a primary care provider, you can also send messages to your care team and make appointments. If you have questions, please call your primary care clinic.  If you do not have a primary care provider, please call [phone] and they will assist you.      [employer] is an electronic gateway that provides easy, online access to your medical records. With [employer], you can request a clinic appointment, read your test results, renew a prescription or communicate with your care team.     To access your existing account, please contact your [facility] or call [phone] for assistance.        Care EveryWhere ID     This is your Care EveryWhere ID. This could be used by other organizations to access your [place] medical records  [id number]         Blood  Pressure from Last 3 Encounters:   09/12/18 (!) 128/93   08/28/18 128/82   08/13/18 128/90    Weight from Last 3 Encounters:   09/12/18 127.6 kg (281 lb 4.8 oz)   08/13/18 119.9 kg (264 lb 4.8 oz)   01/30/18 120.7 kg (266 lb)               Primary Care Provider Fax #    Physician No Ref-Primary [phone]       No address on file        Equal Access to Services     PURVI DELANEY : Hadii aad ku hadasho Soomaali, waaxda luqadaha, qaybta kaalmada adeegyada, waxay leonelain hayderekn maddie vallejo . So [facility] [phone].    ATENCIÓN: Si habla español, tiene a wick disposición servicios gratuitos de asistencia lingüística. Ll[facility] [phone].    We comply with applicable federal civil rights laws and [place] laws. We do not discriminate on the basis of race, color, national origin, age, disability, sex, sexual orientation, or gender identity.            Thank you!     Thank you for choosing [facility]  for your care. Our goal is always to provide you with excellent care. Hearing back from our patients is one way we can continue to improve our services. Please take a few minutes to complete the written survey that you may receive in the mail after your visit with us. Thank you!             Your Updated Medication List - Protect others around you: Learn how to safely use, store and throw away your medicines at www.disposemymeds.org.          This list is accurate as of 12/4/18 11:59 PM.  Always use your most recent med list.                   Brand Name Dispense Instructions for use Diagnosis    ibuprofen 600 MG tablet    ADVIL/MOTRIN     Take 600 mg by mouth        order for DME     1 Units    Roll-A-Bout Walker. Patient can use for 2 months        TYLENOL PO

## 2018-12-19 ENCOUNTER — OFFICE VISIT (OUTPATIENT)
Dept: OBGYN | Facility: CLINIC | Age: 33
End: 2018-12-19
Payer: COMMERCIAL

## 2018-12-19 VITALS
WEIGHT: 289.8 LBS | SYSTOLIC BLOOD PRESSURE: 122 MMHG | OXYGEN SATURATION: 99 % | HEART RATE: 82 BPM | TEMPERATURE: 98.2 F | BODY MASS INDEX: 45.39 KG/M2 | DIASTOLIC BLOOD PRESSURE: 86 MMHG

## 2018-12-19 DIAGNOSIS — N91.2 AMENORRHEA: Primary | ICD-10-CM

## 2018-12-19 LAB — B-HCG SERPL-ACNC: <1 IU/L (ref 0–5)

## 2018-12-19 PROCEDURE — 99214 OFFICE O/P EST MOD 30 MIN: CPT | Performed by: OBSTETRICS & GYNECOLOGY

## 2018-12-19 PROCEDURE — 84702 CHORIONIC GONADOTROPIN TEST: CPT | Performed by: OBSTETRICS & GYNECOLOGY

## 2018-12-19 PROCEDURE — 36415 COLL VENOUS BLD VENIPUNCTURE: CPT | Performed by: OBSTETRICS & GYNECOLOGY

## 2018-12-19 NOTE — PATIENT INSTRUCTIONS
If you have any questions regarding your visit, Please contact your care team.    [facility] Access Services: [phone]      Women s Health CLINIC HOURS TELEPHONE NUMBER   MD Diana Ng CMA Lisa -    MAO Mcallister       Monday:       7:30-4:30 [place]  Wednesday:       7:30-4:30 [place]  Thursday:       7:30-1:30 [place]  Friday:       7:30-11:30 [facility]  [address] [facility] [place]  [address]  [phone] ask for [facility]  [address] [place] [address]  [phone] ask for [facility]    Imaging Scheduling for [place]:  [phone]    Imaging Scheduling for [place]: [phone]       Urgent Care locations:    [facility] Saturday and Sunday   9 am - 5 pm    Monday-Friday   12 pm - 8 pm  Saturday and Sunday   9 am - 5 pm   [phone]     [facility] Labor and Delivery:  [phone]    If you need a medication refill, please contact your pharmacy. Please allow 3 business days for your refill to be completed.  As always, Thank you for trusting us with your healthcare needs!

## 2018-12-20 NOTE — PROGRESS NOTES
Kelsea is a 33 year old   here for follow up of after salpingectomy for ectopic pregnancy back in the fall.  She presents reporting she hasn't had a cycle since surgery, but does report brownish discharge in November..  .  ROS: No urinary frequency or dysuria, bladder or kidney problems  ROS: Ten point review of systems was reviewed and negative except the above.    PMH: Her past medical, surgical, and obstetric histories were reviewed and are documented in their appropriate chart areas.    ALL/Meds: Her medication and allergy histories were reviewed and are documented in their appropriate chart areas.    SH/FMH: Reviewed and documented in the appropriate area.    PE: /86   Pulse 82   Temp 98.2  F (36.8  C) (Oral)   Wt 131.5 kg (289 lb 12.8 oz)   LMP 2018   SpO2 99%   BMI 45.39 kg/m      Explained that this isn't uncommon and probably is due to a lack of consistent ovulation.  I explained how a normal menstrual cycle is controlled by ovulation.  How the the increase in estrogen levels within the first few days of a new month will cause both endometrial growth and follicular development.  That it is this growth of the endometrium that will ultimately cause the bleeding to cease.    I explained how the dominant follicle will proceed, along with continued endometrial growth during the second week of the month.  How ovulation is triggered but a sudden surge of LH at roughly mid-cycle.    We discussed that progesterone is only produced in significant levels after ovulation and that it is this progesterone that shifts the endometrium from the growth to the secretory phase.      I explained how this production of progesterone ceases after 7 days unless there are levels of HCG being produced.  How this cessation of progesterone leads to spiral artery spasm, hypoxia, ischemia and finally sloughing of the endometrium.  I also explain how the lack of ovulation can lead to break-through bleeding or  skipped cycles.     We will check her HCG level to ensure it is back to normal and then trigger a progesterone withdrawal period    A/P:   Kelsea presents with (N91.2) Amenorrhea  (primary encounter diagnosis)  Comment: Out of 30 minutes spent face to face with the patient, > 50% of this was spent in consultation.  Plan: HCG Quantitative Pregnancy, Blood (HGH415),         progesterone (PROMETRIUM) 200 MG capsule                -    Orders Placed This Encounter   Procedures     HCG Quantitative Pregnancy, Blood (YBP484)         Wilfred Flores MD FACOG

## 2019-03-07 ENCOUNTER — ANCILLARY PROCEDURE (OUTPATIENT)
Dept: GENERAL RADIOLOGY | Facility: CLINIC | Age: 34
End: 2019-03-07
Payer: COMMERCIAL

## 2019-03-07 ENCOUNTER — OFFICE VISIT (OUTPATIENT)
Dept: ORTHOPEDICS | Facility: CLINIC | Age: 34
End: 2019-03-07
Payer: COMMERCIAL

## 2019-03-07 DIAGNOSIS — M25.571 RIGHT ANKLE PAIN, UNSPECIFIED CHRONICITY: Primary | ICD-10-CM

## 2019-03-07 DIAGNOSIS — M76.811 ANTERIOR TIBIALIS TENDINITIS OF RIGHT LOWER EXTREMITY: ICD-10-CM

## 2019-03-07 DIAGNOSIS — S93.431S SYNDESMOTIC DISRUPTION OF RIGHT ANKLE, SEQUELA: Primary | ICD-10-CM

## 2019-03-07 DIAGNOSIS — M21.41 PES PLANUS OF RIGHT FOOT: ICD-10-CM

## 2019-03-07 DIAGNOSIS — M25.571 RIGHT ANKLE PAIN, UNSPECIFIED CHRONICITY: ICD-10-CM

## 2019-03-07 NOTE — PROGRESS NOTES
Subjective:   Kelsea Murray is a 33 year old female who follows up with right ankle pain. She is 6 months s/p right ankle syndesmosis open reduction and internal fixation. She complains that she still does not have her quality of life back. She states that the ankle is very stiff in the morning and the pain worsens throughout the day. By the end of the day, she is limping.      Date of injury: 8/11/18  Date last seen: 3/7/2019  Following Therapeutic Plan: Yes     PAST MEDICAL, SOCIAL, SURGICAL AND FAMILY HISTORY: She  has a past medical history of NO ACTIVE PROBLEMS.  She  has a past surgical history that includes no history of surgery.  Her family history includes Breast Cancer in her maternal grandmother.  She reports that  has never smoked. she has never used smokeless tobacco. She reports that she drinks alcohol. She reports that she does not use drugs.    ALLERGIES: She has No Known Allergies.    CURRENT MEDICATIONS: She has a current medication list which includes the following prescription(s): acetaminophen, ibuprofen, and progesterone.     REVIEW OF SYSTEMS: 12 point review of systems is negative except as noted above.     Exam:   There were no vitals taken for this visit.      CONSTITUTIONAL: healthy, alert and no distress  SKIN: no suspicious lesions or rashes  GAIT: broad based  NEUROLOGIC: Non-focal  PSYCHIATRIC: affect normal/bright and mentation appears normal.    MUSCULOSKELETAL:   RIGHT ANKLE: She has well-healed scars.  Ankle dorsiflexion is up approximately 22 degrees and ankle plantarflexion approximately 22 degrees.  She is nontender over her hardware either laterally or medially.  She is nontender over the syndesmosis.  She has negative dorsiflexion external rotation stress test.  She is diffusely tender over the region of the deltoid ligament and the medial malleolus or talar articulation.  The Achilles tendon is nontender and intact.  She has negative anterior drawer negative talar tilt.  She  "is tender to palpation over the tibialis anterior and has some discomfort with resisted dorsi flexion.       Assessment/Plan:   (S93.382S) Syndesmotic disruption of right ankle, sequela  (primary encounter diagnosis)  (M76.811) Anterior tibialis tendinitis of right lower extremity  Comment: She is a 33-year-old female who underwent open reduction and internal fixation of her syndesmotic injury in August 2018.  She was been doing fairly well but continues with some ankle soreness and stiffness.  Most of this is occurring medially.  She has some underlying pes planus and going to place her in a pair of off-the-shelf orthotics.  She also has a tibialis anterior tendinitis and I am going to have her see physical therapy to work on this.  We will also work on range of motion and strengthening of the ankle as well as gait training.  I discussed with her that it is unlikely that we will undertake any type of intervention or other or further imaging prior to 1 year post surgery.  All questions were answered for her and her .    (M21.41) Pes planus of right foot  Comment: Superfeet OTC orthotics given today. Encouarged to wear while awake and discussed transition into the orthotics and discouraged an \"all or none\" approach to them.         "

## 2019-03-07 NOTE — LETTER
3/7/2019      RE: Kelsea Murray  [address]  [place] [address]        Subjective:   Kelsea Murray is a 33 year old female who follows up with right ankle pain. She is 6 months s/p right ankle syndesmosis open reduction and internal fixation. She complains that she still does not have her quality of life back. She states that the ankle is very stiff in the morning and the pain worsens throughout the day. By the end of the day, she is limping.      Date of injury: 8/11/18  Date last seen: 3/7/2019  Following Therapeutic Plan: Yes     PAST MEDICAL, SOCIAL, SURGICAL AND FAMILY HISTORY: She  has a past medical history of NO ACTIVE PROBLEMS.  She  has a past surgical history that includes no history of surgery.  Her family history includes Breast Cancer in her maternal grandmother.  She reports that  has never smoked. she has never used smokeless tobacco. She reports that she drinks alcohol. She reports that she does not use drugs.    ALLERGIES: She has No Known Allergies.    CURRENT MEDICATIONS: She has a current medication list which includes the following prescription(s): acetaminophen, ibuprofen, and progesterone.     REVIEW OF SYSTEMS: 12 point review of systems is negative except as noted above.     Exam:   There were no vitals taken for this visit.      CONSTITUTIONAL: healthy, alert and no distress  SKIN: no suspicious lesions or rashes  GAIT: broad based  NEUROLOGIC: Non-focal  PSYCHIATRIC: affect normal/bright and mentation appears normal.    MUSCULOSKELETAL:   RIGHT ANKLE: She has well-healed scars.  Ankle dorsiflexion is up approximately 22 degrees and ankle plantarflexion approximately 22 degrees.  She is nontender over her hardware either laterally or medially.  She is nontender over the syndesmosis.  She has negative dorsiflexion external rotation stress test.  She is diffusely tender over the region of the deltoid ligament and the medial malleolus or talar articulation.  The Achilles  "tendon is nontender and intact.  She has negative anterior drawer negative talar tilt.  She is tender to palpation over the tibialis anterior and has some discomfort with resisted dorsi flexion.     Assessment/Plan:   (S93.431S) Syndesmotic disruption of right ankle, sequela  (primary encounter diagnosis)  (M76.811) Anterior tibialis tendinitis of right lower extremity  Comment: She is a 33-year-old female who underwent open reduction and internal fixation of her syndesmotic injury in August 2018.  She was been doing fairly well but continues with some ankle soreness and stiffness.  Most of this is occurring medially.  She has some underlying pes planus and going to place her in a pair of off-the-shelf orthotics.  She also has a tibialis anterior tendinitis and I am going to have her see physical therapy to work on this.  We will also work on range of motion and strengthening of the ankle as well as gait training.  I discussed with her that it is unlikely that we will undertake any type of intervention or other or further imaging prior to 1 year post surgery.  All questions were answered for her and her .    (M21.41) Pes planus of right foot  Comment: Superfeet OTC orthotics given today. Encouarged to wear while awake and discussed transition into the orthotics and discouraged an \"all or none\" approach to them.     Matty Souza MD    "

## 2019-05-06 ENCOUNTER — ANCILLARY PROCEDURE (OUTPATIENT)
Dept: MRI IMAGING | Facility: CLINIC | Age: 34
End: 2019-05-06
Attending: PHYSICIAN ASSISTANT
Payer: COMMERCIAL

## 2019-05-06 ENCOUNTER — OFFICE VISIT (OUTPATIENT)
Dept: GASTROENTEROLOGY | Facility: CLINIC | Age: 34
End: 2019-05-06
Payer: COMMERCIAL

## 2019-05-06 VITALS
WEIGHT: 282.1 LBS | OXYGEN SATURATION: 96 % | HEIGHT: 68 IN | SYSTOLIC BLOOD PRESSURE: 126 MMHG | BODY MASS INDEX: 42.75 KG/M2 | HEART RATE: 67 BPM | DIASTOLIC BLOOD PRESSURE: 83 MMHG

## 2019-05-06 DIAGNOSIS — R74.01 ELEVATED ALT MEASUREMENT: ICD-10-CM

## 2019-05-06 DIAGNOSIS — K85.90 ACUTE PANCREATITIS, UNSPECIFIED COMPLICATION STATUS, UNSPECIFIED PANCREATITIS TYPE: Primary | ICD-10-CM

## 2019-05-06 DIAGNOSIS — Z09 HOSPITAL DISCHARGE FOLLOW-UP: ICD-10-CM

## 2019-05-06 DIAGNOSIS — K85.90 ACUTE PANCREATITIS, UNSPECIFIED COMPLICATION STATUS, UNSPECIFIED PANCREATITIS TYPE: ICD-10-CM

## 2019-05-06 LAB
ALBUMIN SERPL-MCNC: 3.6 G/DL (ref 3.4–5)
ALP SERPL-CCNC: 85 U/L (ref 40–150)
ALT SERPL W P-5'-P-CCNC: 94 U/L (ref 0–50)
ANION GAP SERPL CALCULATED.3IONS-SCNC: 3 MMOL/L (ref 3–14)
AST SERPL W P-5'-P-CCNC: 41 U/L (ref 0–45)
BASOPHILS # BLD AUTO: 0.1 10E9/L (ref 0–0.2)
BASOPHILS NFR BLD AUTO: 0.9 %
BILIRUB SERPL-MCNC: 0.3 MG/DL (ref 0.2–1.3)
BUN SERPL-MCNC: 10 MG/DL (ref 7–30)
CALCIUM SERPL-MCNC: 9 MG/DL (ref 8.5–10.1)
CHLORIDE SERPL-SCNC: 110 MMOL/L (ref 94–109)
CO2 SERPL-SCNC: 29 MMOL/L (ref 20–32)
CREAT SERPL-MCNC: 0.78 MG/DL (ref 0.52–1.04)
DIFFERENTIAL METHOD BLD: NORMAL
EOSINOPHIL # BLD AUTO: 0.2 10E9/L (ref 0–0.7)
EOSINOPHIL NFR BLD AUTO: 2.7 %
ERYTHROCYTE [DISTWIDTH] IN BLOOD BY AUTOMATED COUNT: 11.8 % (ref 10–15)
GFR SERPL CREATININE-BSD FRML MDRD: >90 ML/MIN/{1.73_M2}
GLUCOSE SERPL-MCNC: 93 MG/DL (ref 70–99)
HCT VFR BLD AUTO: 41.1 % (ref 35–47)
HGB BLD-MCNC: 13.6 G/DL (ref 11.7–15.7)
IMM GRANULOCYTES # BLD: 0 10E9/L (ref 0–0.4)
IMM GRANULOCYTES NFR BLD: 0.3 %
LIPASE SERPL-CCNC: 130 U/L (ref 73–393)
LYMPHOCYTES # BLD AUTO: 1.4 10E9/L (ref 0.8–5.3)
LYMPHOCYTES NFR BLD AUTO: 21 %
MCH RBC QN AUTO: 28.3 PG (ref 26.5–33)
MCHC RBC AUTO-ENTMCNC: 33.1 G/DL (ref 31.5–36.5)
MCV RBC AUTO: 85 FL (ref 78–100)
MONOCYTES # BLD AUTO: 0.7 10E9/L (ref 0–1.3)
MONOCYTES NFR BLD AUTO: 10.1 %
NEUTROPHILS # BLD AUTO: 4.3 10E9/L (ref 1.6–8.3)
NEUTROPHILS NFR BLD AUTO: 65 %
PLATELET # BLD AUTO: 278 10E9/L (ref 150–450)
POTASSIUM SERPL-SCNC: 4.2 MMOL/L (ref 3.4–5.3)
PROT SERPL-MCNC: 7.1 G/DL (ref 6.8–8.8)
RADIOLOGIST FLAGS: NORMAL
RBC # BLD AUTO: 4.81 10E12/L (ref 3.8–5.2)
SODIUM SERPL-SCNC: 142 MMOL/L (ref 133–144)
WBC # BLD AUTO: 6.6 10E9/L (ref 4–11)

## 2019-05-06 PROCEDURE — 82787 IGG 1 2 3 OR 4 EACH: CPT | Performed by: PHYSICIAN ASSISTANT

## 2019-05-06 PROCEDURE — 83540 ASSAY OF IRON: CPT | Performed by: PHYSICIAN ASSISTANT

## 2019-05-06 PROCEDURE — 87340 HEPATITIS B SURFACE AG IA: CPT | Performed by: PHYSICIAN ASSISTANT

## 2019-05-06 PROCEDURE — 82728 ASSAY OF FERRITIN: CPT | Performed by: PHYSICIAN ASSISTANT

## 2019-05-06 PROCEDURE — 82784 ASSAY IGA/IGD/IGG/IGM EACH: CPT | Performed by: PHYSICIAN ASSISTANT

## 2019-05-06 PROCEDURE — 83690 ASSAY OF LIPASE: CPT | Performed by: PHYSICIAN ASSISTANT

## 2019-05-06 PROCEDURE — 99214 OFFICE O/P EST MOD 30 MIN: CPT | Performed by: PHYSICIAN ASSISTANT

## 2019-05-06 PROCEDURE — A9585 GADOBUTROL INJECTION: HCPCS | Performed by: PHYSICIAN ASSISTANT

## 2019-05-06 PROCEDURE — 99000 SPECIMEN HANDLING OFFICE-LAB: CPT | Performed by: PHYSICIAN ASSISTANT

## 2019-05-06 PROCEDURE — 82103 ALPHA-1-ANTITRYPSIN TOTAL: CPT | Mod: 90 | Performed by: PHYSICIAN ASSISTANT

## 2019-05-06 PROCEDURE — 82104 ALPHA-1-ANTITRYPSIN PHENO: CPT | Mod: 90 | Performed by: PHYSICIAN ASSISTANT

## 2019-05-06 PROCEDURE — 74183 MRI ABD W/O CNTR FLWD CNTR: CPT | Performed by: RADIOLOGY

## 2019-05-06 PROCEDURE — 86706 HEP B SURFACE ANTIBODY: CPT | Performed by: PHYSICIAN ASSISTANT

## 2019-05-06 PROCEDURE — 86803 HEPATITIS C AB TEST: CPT | Performed by: PHYSICIAN ASSISTANT

## 2019-05-06 PROCEDURE — 85025 COMPLETE CBC W/AUTO DIFF WBC: CPT | Performed by: PHYSICIAN ASSISTANT

## 2019-05-06 PROCEDURE — 83550 IRON BINDING TEST: CPT | Performed by: PHYSICIAN ASSISTANT

## 2019-05-06 PROCEDURE — 80053 COMPREHEN METABOLIC PANEL: CPT | Performed by: PHYSICIAN ASSISTANT

## 2019-05-06 PROCEDURE — 86704 HEP B CORE ANTIBODY TOTAL: CPT | Performed by: PHYSICIAN ASSISTANT

## 2019-05-06 PROCEDURE — 36415 COLL VENOUS BLD VENIPUNCTURE: CPT | Performed by: PHYSICIAN ASSISTANT

## 2019-05-06 RX ORDER — GADOBUTROL 604.72 MG/ML
10 INJECTION INTRAVENOUS ONCE
Status: COMPLETED | OUTPATIENT
Start: 2019-05-06 | End: 2019-05-06

## 2019-05-06 RX ADMIN — GADOBUTROL 10 ML: 604.72 INJECTION INTRAVENOUS at 14:29

## 2019-05-06 ASSESSMENT — MIFFLIN-ST. JEOR: SCORE: 2038.1

## 2019-05-06 ASSESSMENT — PAIN SCALES - GENERAL: PAINLEVEL: NO PAIN (0)

## 2019-05-06 NOTE — PATIENT INSTRUCTIONS
- Blood work today   - Please call [phone] to schedule an MRI at your earliest convenience.   - Follow up 2 weeks after your MRI.

## 2019-05-06 NOTE — PROGRESS NOTES
GASTROENTEROLOGY NEW PATIENT CLINIC VISIT    CC/REFERRING MD:    No Ref-Primary, Physician  Referred Self    REASON FOR CONSULTATION:   Referred by Referred Self for Consult (ED follow up for pancreatitis)      HISTORY OF PRESENT ILLNESS:    Kelsea Murray is 33 year old female who presents for evaluation of pancreatitis.  She began to have epigastric pain that radiated straight to her back along with nausea and fever.  Her symptoms took her to [facility] emergency room for immediate evaluation on 4/12/2019.  She was found to have a mildly elevated lipase level of 357 with interstitial edematous pancreatitis noted on CT of the abdomen pelvis.  She was admitted for 1 day for treatment which included aggressive IV fluids and n.p.o. status.  She improved quickly and was tolerating an advancing diet so was discharged home.  She presents today to follow-up.  This was her first bout of pancreatitis.  During her brief hospitalization she was tested with an abdominal sonogram which was negative for gallstones.  Her triglyceride levels were also found to be normal as well as her calcium level.  She admits that she drinks alcohol 2-3 times a week socially which can consist of anywhere from 2-7 beers.  Since her hospitalization she has completely stopped drinking alcohol.  She denies smoking.  She did take an over the counter diuretic stool softener? (she does not recall the name) prior to her bout of pancreatitis and also mentions that she was following the keto diet (low carb, high fat) prior to her bout of pancreatitis.    It has been about 3 weeks since her hospitalization and she is currently feeling well.  She denies any epigastric pain.  No nausea or vomiting.  She denies any changes in her bowel habits.  As mentioned she has never had pancreatitis before.  She denies having a family history of pancreatitis to the best of her knowledge.        PROBLEM LIST  Patient Active Problem List    Diagnosis Date  Noted     Axillary lymphadenopathy 01/15/2018     Priority: Medium     Disturbance of skin sensation 01/15/2018     Priority: Medium     Obesity, morbid, BMI 40.0-49.9 (H) 01/15/2018     Priority: Medium       PERTINENT PAST MEDICAL HISTORY:  (I personally reviewed this history with the patient at today's visit)   Past Medical History:   Diagnosis Date     NO ACTIVE PROBLEMS          PREVIOUS SURGERIES: (I personally reviewed this history with the patient at today's visit)   Past Surgical History:   Procedure Laterality Date     NO HISTORY OF SURGERY           ALLERGIES:   No Known Allergies    PERTINENT MEDICATIONS:    Current Outpatient Medications:      Acetaminophen (TYLENOL PO), Take 650 mg by mouth as needed , Disp: , Rfl:      ibuprofen (ADVIL/MOTRIN) 600 MG tablet, Take 600 mg by mouth as needed , Disp: , Rfl:      progesterone (PROMETRIUM) 200 MG capsule, Take 1 capsule (200 mg) by mouth daily (Patient not taking: Reported on 3/7/2019), Disp: 7 capsule, Rfl: 3    SOCIAL HISTORY:  Social History     Socioeconomic History     Marital status:      Spouse name: Not on file     Number of children: Not on file     Years of education: Not on file     Highest education level: Not on file   Occupational History     Not on file   Social Needs     Financial resource strain: Not on file     Food insecurity:     Worry: Not on file     Inability: Not on file     Transportation needs:     Medical: Not on file     Non-medical: Not on file   Tobacco Use     Smoking status: Never Smoker     Smokeless tobacco: Never Used   Substance and Sexual Activity     Alcohol use: Yes     Drug use: No     Sexual activity: Yes     Partners: Male   Lifestyle     Physical activity:     Days per week: Not on file     Minutes per session: Not on file     Stress: Not on file   Relationships     Social connections:     Talks on phone: Not on file     Gets together: Not on file     Attends [employer] service: Not on file     Active member  "of club or organization: Not on file     Attends meetings of clubs or organizations: Not on file     Relationship status: Not on file     Intimate partner violence:     Fear of current or ex partner: Not on file     Emotionally abused: Not on file     Physically abused: Not on file     Forced sexual activity: Not on file   Other Topics Concern     Parent/sibling w/ CABG, MI or angioplasty before 65F 55M? Not Asked   Social History Narrative     Not on file       FAMILY HISTORY: (I personally reviewed this history with the patient at today's visit)  Family History   Problem Relation Age of Onset     Breast Cancer Maternal Grandmother         Dx in 70s     Deep Vein Thrombosis No family hx of      Bleeding Diathesis No family hx of      Anesthesia Reaction No family hx of        ROS:    No fevers or chills  No weight loss  No blurry vision, double vision or change in vision  No sore throat  No lymphadenopathy  No headache, paraesthesias, or weakness in a limb  No shortness of breath or wheezing  No chest pain or pressure  No arthralgias or myalgias  No rashes or skin changes  No odynophagia or dysphagia  No BRBPR, hematochezia, melena  No dysuria, frequency or urgency  No hot/cold intolerance or polyria  No anxiety or depression  PHYSICAL EXAMINATION:  Constitutional: aaox3, cooperative, pleasant, not dyspneic/diaphoretic, no acute distress  Vitals reviewed: /83 (BP Location: Left arm, Patient Position: Sitting, Cuff Size: Adult Large)   Pulse 67   Ht 1.727 m (5' 8\")   Wt 128 kg (282 lb 1.6 oz)   SpO2 96%   BMI 42.89 kg/m     Wt:   Wt Readings from Last 2 Encounters:   05/06/19 128 kg (282 lb 1.6 oz)   12/19/18 131.5 kg (289 lb 12.8 oz)        Physical Exam    Eyes: Sclera anicteric/injected  Ears/nose/mouth/throat: Normal oropharynx without ulcers or exudate, mucus membranes moist, hearing intact  Neck: supple, thyroid normal size  CV: No edema, RRR  Respiratory: Unlabored breathing, CTAB  Lymph: No " submandibular, supraclavicular or inguinal lymphadenopathy  Abd: Nondistended, no masses, +bs, no hepatosplenomegaly, nontender, no peritoneal signs  Skin: warm, perfused, no jaundice  Psych: Normal affect  MSK: Normal gait      PERTINENT STUDIES: (I personally reviewed these laboratory studies today)    Most recent CBC: 4/13/19  WBC 4.3 - 10.8 K/uL 10.1    RBC 4.20 - 5.40 M/uL 4.10Low     HEMOGLOBIN 12.0 - 16.0 gm/dL 11.9Low     HEMATOCRIT 36.0 - 48.0 % 35.2Low     MCV 80 - 100 fl 86    MCH 27 - 33 pg 29    MCHC 33 - 36 gm/dL 34    RDW 11.5 - 14.5 % 12.3    PLATELET COUNT 150 - 400 K/    MPV 6.5 - 12.0  10.3    PMN %  % 69.2    IG% <=1.0 % 0.2    LYMPH %  % 17.3    MONO %  % 12.1    EOS %  % 1.1    BASO %  % 0.3    PMN ABSOLUTE 1.80 - 7.80 K/uL 7.01    IG ABSOLUTE  K/uL 0.02    LYMPH ABSOLUTE 1.00 - 4.00 K/uL 1.75    MONO ABSOLUTE 0.00 - 1.00 K/uL 1.23High     EOS ABSOLUTE 0.00 - 0.45 K/uL 0.11    BASO ABSOLUTE 0.00 - 0.20 K/uL 0.03            Most recent basic metabolic panel 4/12/19:   Ref Range & Units Value   SODIUM 136 - 145 mmol/L 141    POTASSIUM 3.5 - 5.1 mmol/L 3.6    CHLORIDE 98 - 107 mmol/L 106    CARBON DIOXIDE 21 - 32 mmol/L 26    BUN (UREA NITRO) 7 - 18 mg/dL 10    CREATININE 0.55 - 1.02 mg/dL 0.77    EST GFR (MDRD) >60 mL/min >60    EST GFR IF AFRICAN AM >60 mL/min >60    GLUCOSE 70 - 110 mg/dL 102    CALCIUM, SERUM 8.5 - 10.1 mg/dL 8.6    ANION GAP 0.0 - 15.0 mmol/L 9.0          4/12/19   Ref Range & Units Value   LIPASE 73 - 350 IU/L 357High       4/13/19   Ref Range & Units Value   LIPASE 73 - 350 IU/L 271        Cholesterol 4/13/19  SPECIMEN TYPE   Fasting    CHOLESTEROL <200 mg/dL 144    TRIGLYCERIDES PROFILE <150 mg/dL <40    LDL CHOL, CALC <100 mg/dL >95    HDL CHOLESTEROL >40 mg/dL 41    CHOL/HDL RATIO 0.0 - 4.9  3.5              RADIOLOGY:    EXAM: CT ABDOMEN AND PELVIS WITH CONTRAST, 4/12/2019    CLINICAL DATA: Abdominal pain.    COMPARISON: None.    TECHNIQUE: Axial, sagittal, and  coronal images of the abdomen and pelvis reconstructed after IV contrast administration.    CONTRAST: 100  ml Omnipaque 350 IV    FINDINGS: Lung bases are clear.    Liver, gallbladder, spleen, adrenal glands, and kidneys are normal.    Abnormal inflammatory fat stranding adjacent to pancreatic head and uncinate process with slight inferior extension along posterior retroperitoneum. No fluid collection. Pancreas appears slightly atrophic for patient age. No glandular necrosis, cyst, or abscess. Normal opacification of the splenic vein.    Stomach, small bowel, and the appendix are normal. Scattered diverticula along parts of the colon without adjacent inflammation.    Urinary bladder has normal contours. Normal uterus. No adnexal abnormality.    No free fluid or lymphadenopathy.    Normal bones.    IMPRESSION:     1.  Interstitial edematous pancreatitis.    2.  Colonic diverticula without diverticulitis.    REPORT SIGNED BY DR. Yovanny Su             EXAM: UPPER ABDOMINAL ULTRASOUND, 4/12/2019    CLINICAL DATA: Abdominal pain.    COMPARISON: 10/29/2015.    TECHNIQUE: Transabdominal imaging.    FINDINGS: The liver has normal contours and echotexture.  No intrahepatic bile duct dilation or focal mass.    No gallstones or sludge.  Normal wall thickness.  No pericholecystic fluid. The common bile duct diameter is normal, 3 mm.    Pancreas largely obscured by bowel gas. The spleen and both kidneys are normal.    Patent and appropriate directional flow in the main portal vein.  Normal IVC. Normal abdominal aorta.  No free fluid in the upper abdomen.    IMPRESSION  IMPRESSION:    Normal exam.    REPORT SIGNED BY DR. Yovanny Su            ASSESSMENT/PLAN:    Kelsea Murray is a 33 year old female who presents for evaluation of recent acute pancreatitis attack. We reviewed her recent brief hospitalization to [facility] for her acute pancreatitis. Her work up included blood work, an abdominal sonogram and CT  of the abd pelvis which noted interstitial edematous pancreas. Etiology of her pancreatitis is unclear at this time. No gallstones were noted on abd sonogram and her labs revealed an unremarkable TG and calcium level. She does drink 2-7 beers on weekends but states this number is closer to 2 -3 beers. She is avoiding alcohol at this time and is also adhering to a low fat diet. It is possible that her pancreatitis could be related to alcohol but we will continue with work up to rule potential other causes. Will check IgG 4 level and an MRCP. Further recommendations thereafter.         Acute pancreatitis, unspecified complication status, unspecified pancreatitis type  Hospital discharge follow-up        Orders Placed This Encounter   Procedures     MR Abdomen MRCP w/o & w Contrast     IgG subclasses     CBC with platelets differential     Lipase     Comprehensive metabolic panel       Follow-up about 2 weeks after MRI.    Thank you for this consultation.  It was a pleasure to participate in the care of this patient; please contact us with any further questions.      This note was created with voice recognition software, and while reviewed for accuracy, typos may remain.     Fior Frye PA-C  Gastroenterology  [facility]

## 2019-05-07 ENCOUNTER — MYC MEDICAL ADVICE (OUTPATIENT)
Dept: WOUND CARE | Facility: CLINIC | Age: 34
End: 2019-05-07

## 2019-05-07 LAB
FERRITIN SERPL-MCNC: 34 NG/ML (ref 12–150)
HBV CORE AB SERPL QL IA: NONREACTIVE
HBV SURFACE AB SERPL IA-ACNC: 4.91 M[IU]/ML
HBV SURFACE AG SERPL QL IA: NONREACTIVE
HCV AB SERPL QL IA: NONREACTIVE
IGG SERPL-MCNC: 1020 MG/DL (ref 695–1620)
IGG1 SER-MCNC: 524 MG/DL (ref 300–856)
IGG2 SER-MCNC: 418 MG/DL (ref 158–761)
IGG3 SER-MCNC: 71 MG/DL (ref 24–192)
IGG4 SER-MCNC: 9 MG/DL (ref 11–86)
IRON SATN MFR SERPL: 19 % (ref 15–46)
IRON SERPL-MCNC: 57 UG/DL (ref 35–180)
TIBC SERPL-MCNC: 307 UG/DL (ref 240–430)

## 2019-05-10 LAB
A1AT PHENOTYP SERPL-IMP: NORMAL
A1AT SERPL-MCNC: 130 MG/DL (ref 90–200)

## 2019-05-23 ENCOUNTER — OFFICE VISIT (OUTPATIENT)
Dept: GASTROENTEROLOGY | Facility: CLINIC | Age: 34
End: 2019-05-23
Payer: COMMERCIAL

## 2019-05-23 ENCOUNTER — OFFICE VISIT (OUTPATIENT)
Dept: PEDIATRICS | Facility: CLINIC | Age: 34
End: 2019-05-23
Payer: COMMERCIAL

## 2019-05-23 VITALS
HEIGHT: 68 IN | BODY MASS INDEX: 42.3 KG/M2 | TEMPERATURE: 97.7 F | HEART RATE: 60 BPM | WEIGHT: 279.1 LBS | OXYGEN SATURATION: 98 % | DIASTOLIC BLOOD PRESSURE: 84 MMHG | SYSTOLIC BLOOD PRESSURE: 128 MMHG

## 2019-05-23 VITALS
BODY MASS INDEX: 42.21 KG/M2 | SYSTOLIC BLOOD PRESSURE: 146 MMHG | WEIGHT: 278.5 LBS | OXYGEN SATURATION: 98 % | HEIGHT: 68 IN | HEART RATE: 69 BPM | DIASTOLIC BLOOD PRESSURE: 95 MMHG

## 2019-05-23 DIAGNOSIS — E66.01 OBESITY, MORBID, BMI 40.0-49.9 (H): ICD-10-CM

## 2019-05-23 DIAGNOSIS — R93.3 ABNORMAL MAGNETIC RESONANCE CHOLANGIOPANCREATOGRAPHY (MRCP): Primary | ICD-10-CM

## 2019-05-23 DIAGNOSIS — R74.01 ELEVATED ALT MEASUREMENT: ICD-10-CM

## 2019-05-23 DIAGNOSIS — R93.7 ABNORMAL MRI, THORACIC SPINE: ICD-10-CM

## 2019-05-23 DIAGNOSIS — K85.90 ACUTE PANCREATITIS, UNSPECIFIED COMPLICATION STATUS, UNSPECIFIED PANCREATITIS TYPE: Primary | ICD-10-CM

## 2019-05-23 DIAGNOSIS — Z87.19 HX OF ACUTE PANCREATITIS: ICD-10-CM

## 2019-05-23 PROBLEM — R59.0 AXILLARY LYMPHADENOPATHY: Status: RESOLVED | Noted: 2018-01-15 | Resolved: 2019-05-23

## 2019-05-23 LAB
ALBUMIN SERPL-MCNC: 3.7 G/DL (ref 3.4–5)
ALP SERPL-CCNC: 76 U/L (ref 40–150)
ALT SERPL W P-5'-P-CCNC: 63 U/L (ref 0–50)
AST SERPL W P-5'-P-CCNC: 41 U/L (ref 0–45)
BILIRUB DIRECT SERPL-MCNC: 0.1 MG/DL (ref 0–0.2)
BILIRUB SERPL-MCNC: 0.4 MG/DL (ref 0.2–1.3)
CERULOPLASMIN SERPL-MCNC: 39 MG/DL (ref 23–53)
DEPRECATED CALCIDIOL+CALCIFEROL SERPL-MC: 17 UG/L (ref 20–75)
IGA SERPL-MCNC: 160 MG/DL (ref 70–380)
PROT SERPL-MCNC: 7.3 G/DL (ref 6.8–8.8)

## 2019-05-23 PROCEDURE — 99214 OFFICE O/P EST MOD 30 MIN: CPT | Performed by: INTERNAL MEDICINE

## 2019-05-23 PROCEDURE — 83516 IMMUNOASSAY NONANTIBODY: CPT | Mod: 59 | Performed by: PHYSICIAN ASSISTANT

## 2019-05-23 PROCEDURE — 84590 ASSAY OF VITAMIN A: CPT | Mod: 90 | Performed by: PHYSICIAN ASSISTANT

## 2019-05-23 PROCEDURE — 99214 OFFICE O/P EST MOD 30 MIN: CPT | Performed by: PHYSICIAN ASSISTANT

## 2019-05-23 PROCEDURE — 36415 COLL VENOUS BLD VENIPUNCTURE: CPT | Performed by: PHYSICIAN ASSISTANT

## 2019-05-23 PROCEDURE — 83516 IMMUNOASSAY NONANTIBODY: CPT | Mod: 90 | Performed by: PHYSICIAN ASSISTANT

## 2019-05-23 PROCEDURE — 84597 ASSAY OF VITAMIN K: CPT | Mod: 90 | Performed by: PHYSICIAN ASSISTANT

## 2019-05-23 PROCEDURE — 84446 ASSAY OF VITAMIN E: CPT | Mod: 90 | Performed by: PHYSICIAN ASSISTANT

## 2019-05-23 PROCEDURE — 82784 ASSAY IGA/IGD/IGG/IGM EACH: CPT | Performed by: PHYSICIAN ASSISTANT

## 2019-05-23 PROCEDURE — 82306 VITAMIN D 25 HYDROXY: CPT | Performed by: PHYSICIAN ASSISTANT

## 2019-05-23 PROCEDURE — 80076 HEPATIC FUNCTION PANEL: CPT | Performed by: PHYSICIAN ASSISTANT

## 2019-05-23 PROCEDURE — 82390 ASSAY OF CERULOPLASMIN: CPT | Performed by: PHYSICIAN ASSISTANT

## 2019-05-23 PROCEDURE — 99000 SPECIMEN HANDLING OFFICE-LAB: CPT | Performed by: PHYSICIAN ASSISTANT

## 2019-05-23 ASSESSMENT — MIFFLIN-ST. JEOR
SCORE: 2024.49
SCORE: 2021.77

## 2019-05-23 ASSESSMENT — PAIN SCALES - GENERAL: PAINLEVEL: NO PAIN (0)

## 2019-05-23 NOTE — PROGRESS NOTES
[facility]    Kelsea Murray is a 33 year old female who presents to clinic today for the following health issues:    HPI     33-year-old young lady comes in to [facility].  On April 12 she had some mild mid to upper abdominal pain and discomfort so went to emergency room.  There she was found to have mild elevation in lipase but other liver functions studies were normal.  She had had a thyroid ultrasound of the abdomen and the gallbladder was normal.  The pancreas not well visualized on ultrasound so she had a CT scan.  CT scan suggested pancreatitis changes.  Her lipase normalized the second day.  Her symptoms improved and she was discharged.  Lipid panel was done and there was good.  She did give history of using alcohol in modest amount.  Mostly drink beer socially and weekends.  She is to stop using alcohol completely since the episode.  She was seen in the gastroenterology clinic and  MRCP was performed which indicates perhaps some chronic changes in the pancreas.  Incidentally noticed was a small nonspecific enhancing focus on the left T11 vertebral body.  Because of the etiology unknown.  She has no back symptoms.  There is no family history of pancreatitis.      Patient hospitalized on 04/12/19 for acute pancreatitis.    Recently diagnosed with chronic pancreatitis. The MRI I had done showed that she  Has a small enhanced focus on my T 11 vertebrae and the radiologist suggested   that I follow up with a PCP. She currently does not have a PCP and would like to   establish care to look into the cause of this focus on her T 11.      Amount of exercise or physical activity: 1 day/week for an average of 30-45 minutes    Problems taking medications regularly: No    Medication side effects: none    Diet: low fat/cholesterol          Patient Active Problem List   Diagnosis     Disturbance of skin sensation     Obesity, morbid, BMI 40.0-49.9 (H)     Past Surgical History:   Procedure Laterality Date     NO  "HISTORY OF SURGERY         Social History     Tobacco Use     Smoking status: Never Smoker     Smokeless tobacco: Never Used   Substance Use Topics     Alcohol use: Yes     Family History   Problem Relation Age of Onset     Breast Cancer Maternal Grandmother         Dx in 70s     Deep Vein Thrombosis No family hx of      Bleeding Diathesis No family hx of      Anesthesia Reaction No family hx of          Current Outpatient Medications   Medication Sig Dispense Refill     Acetaminophen (TYLENOL PO) Take 650 mg by mouth as needed        ibuprofen (ADVIL/MOTRIN) 600 MG tablet Take 600 mg by mouth as needed        progesterone (PROMETRIUM) 200 MG capsule Take 1 capsule (200 mg) by mouth daily (Patient not taking: Reported on 3/7/2019) 7 capsule 3     No Known Allergies      Reviewed and updated as needed this visit by Provider  Tobacco  Allergies  Meds  Problems  Med Hx  Surg Hx  Fam Hx         Review of Systems   ROS COMP: Constitutional, HEENT, cardiovascular, pulmonary, GI, , musculoskeletal, neuro, skin, endocrine and psych systems are negative, except as otherwise noted.      Objective    /84   Pulse 60   Temp 97.7  F (36.5  C) (Temporal)   Ht 1.727 m (5' 8\")   Wt 126.6 kg (279 lb 1.6 oz)   LMP 04/10/2019 (Approximate)   SpO2 98%   BMI 42.44 kg/m    Body mass index is 42.44 kg/m .  Physical Exam   GENERAL: healthy, alert and no distress  NECK: no adenopathy, no asymmetry, masses, or scars and thyroid normal to palpation  RESP: lungs clear to auscultation - no rales, rhonchi or wheezes  CV: regular rate and rhythm, normal S1 S2, no S3 or S4, no murmur, click or rub, no peripheral edema and peripheral pulses strong  ABDOMEN: soft, nontender, no hepatosplenomegaly, no masses and bowel sounds normal  MS: no gross musculoskeletal defects noted, no edema    Diagnostic Test Results:  Labs reviewed in Epic  No results found for this or any previous visit (from the past 24 hour(s)).  Results for orders " placed or performed in visit on 05/06/19   MR Abdomen MRCP w/o & w Contrast   Result Value Ref Range    Radiologist flags Ill-defined nonspecific pancreatic focus in the     Narrative    MRCP Without and With Contrast    CLINICAL HISTORY: acute pancreatitis etiology unknown, 3 weeks out;  Acute pancreatitis, unspecified complication status, unspecified  pancreatitis type    DATE: 5/6/2019 2:29 PM    TECHNIQUE:     Images were acquired with and without intravenous gadolinium contrast  through the upper abdomen. The following MR images were acquired  without intravenous contrast: TrueFISP, multiplanar T2-weighted, axial  T1 in/out of phase, T2-weighted MRCP images, axial diffusion-weighted  and axial apparent diffusion coefficient. T1-weighted images were  obtained before contrast at the multiple time points following  contrast injection. 3-D reformatted images were generated by the  technologist. Contrast dose: 10ml Gadavist    Comparison study: None    FINDINGS:    Biliary Tree: No significant intra or extrahepatic biliary dilatation.  The posterior hepatic duct drains into the left hepatic duct. The  cystic duct inserts on the posterior junction of the middle and upper  thirds of the common duct. Common bile duct measures 4 mm.    Pancreas: No pancreas divisum. No significant pancreatic ductal  dilatation. Mild fatty atrophy of the pancreas with mild diffuse  precontrast T1 signal. Mildly heterogeneous parenchymal enhancement  and a hazy increase T2 parenchymal signal. Within the uncinate process  is a ill-defined focus of T2 hyperintensity measuring approximately 8  x 7 mm (series 5, image 8) with mild restricted diffusion (series 30,  image 31).     Liver: No focal liver lesion. Scattered areas of focal fatty  deposition, for example images falciform ligament. No significant iron  deposition. Smooth capsular contour. No suspicious focal liver lesion.    Gallbladder: No cholelithiasis.    Spleen: Within normal  limits. Small accessory splenule.    Kidneys: Small medial right T2 hyperintense renal cysts. No  hydronephrosis or hydroureter.    Adrenal glands: Unremarkable.    Bowel: Moderate colonic stool. No dilated loops of bowel.    Lymph nodes: Prominent anitra hepatis lymph nodes, likely reactive,  series 5 image 18.     Blood vessels: Abdominal aorta is patent and within normal limits. The  hepatic and portal veins are patent.    Lung bases: Trace pleural fluid.    Bones and soft tissues: Solitary small 8 mm T2 hyperintense enhancing  focus of the left T11 vertebral body.    Mesentery and abdominal wall: Unremarkable.    Ascites: None.      Impression    IMPRESSION:   1a. MRI findings concerning for mild changes of chronic pancreatitis  with associated mild residual acute inflammatory changes related to  recent bout of pancreatitis.   1b. Ill-defined 8 mm focus of mildly increased T2 signal and  associated mild restricted diffusion in the uncinate process,  nonspecific. Favored to represent mild residual inflammatory changes  related to recent bout of pancreatitis. Short interval follow-up of 3  months recommended.   2. No pancreas divisum. No pancreatic ductal dilatation. No  cholelithiasis or biliary dilatation.  3. Small nonspecific enhancing focus of the left T11 vertebral body,  attention on follow-up.    [Consider Follow Up: Ill-defined nonspecific pancreatic focus in the  uncinate process. Nonspecific T11 vertebral body enhancing focus.]    This report will be copied to the [facility] to ensure a  provider acknowledges the finding.     I have personally reviewed the examination and initial interpretation  and I agree with the findings.    SEUN SANDERS MD           Assessment & Plan   1.  Episode of acute pancreatitis April 12, 2019.  Based on the degree of rise of lipase it seems like a mild attack.  She does not have dyslipidemia.  Lipid profile recently performed was normal.  She has mild  "alcohol use which could be contributing factor.  She has completely stopped using it now.  Gallbladder disease was not identified and no abnormal anatomy of the pancreas was identified.  Informed the patient that in such cases as her a small calculi having the past biliary tract could cause an episode.  If she has recurrent episodes and further investigation may be necessary.  Discussed the importance of weight loss since weight is directly related to gallbladder disease.  Avoid alcohol completely and also avoid nonsteroidal anti-inflammatory medication where possible.  2.  Obesity class III.  Patient is already managed to lose 20 pounds in the past 4 months.  Encouraged her to continue with the effort to lose more weight.  Eat appropriately particularly low-fat diet and exercise.  She has an appointment with nutritionist.  3.  Undetermined small focal lesion in T11 vertebrae.  Incidentally picked up on MRI.  She is scheduled for another MRI of the pancreas in 3 months so we will get a follow-up on it.    Patient advised to come back and see me in 3 months following the MRI.       BMI:   Estimated body mass index is 42.44 kg/m  as calculated from the following:    Height as of this encounter: 1.727 m (5' 8\").    Weight as of this encounter: 126.6 kg (279 lb 1.6 oz).   Weight management plan: Discussed healthy diet and exercise guidelines            Return in about 3 months (around 8/23/2019).    Be Crespo MD  [facility]      "

## 2019-05-23 NOTE — PROGRESS NOTES
GASTROENTEROLOGY FOLLOW UP CLINIC VISIT    CC/REFERRING MD:    Be Crespo      REASON FOR CONSULTATION:  RECHECK (F/U for pancreatitis; Patient reports the feeling of mild epigastric pressure/pain occassionally )      HISTORY OF PRESENT ILLNESS:    Kelsea Murray is 33 year old female who presents for follow up.    She was initially seen on 5/6/2019 for further evaluation of pancreatitis.  She had her first bout of pancreatitis several weeks prior.  She was noting epigastric pain that radiated to her back along with nausea and fever.  She went to [facility] emergency room for further evaluation and was found to have a mildly elevated lipase of 357 with interstitial edematous pink otitis noted on CT scan of the abdomen pelvis.  She is admitted for 1 day and improved after aggressive IV fluids and n.p.o. status.  Because of her pancreatitis spelt is unknown at this time.  She was tested with an abdominal sonogram which ruled out gallstones.  Her triglyceride levels were found to be normal as well as her calcium levels.  She admits that she drinks alcohol 2-3 times a week socially but has stopped all alcohol since her bout of pancreatitis.  We checked her IgG4 level and autoimmune markers to rule out IgG4 mediated or autoimmune pancreatitis and these were normal.  We also further evaluated with an MRCP which revealed concerns of mild chronic pancreatitis along with acute inflammatory changes.     She is currently doing well at this time.  She does note occasional postprandial fullness but this is not daily.  She denies any nausea vomiting.  She denies any issues or concerns with her bowel movements.  She has been trying her best to adhere to a low-fat diet.  She is avoiding all alcohol and is a non-smoker.    She was noted to have mildly elevated ALT of 94 on previous labs.  Several liver serology tests performed including hepatitis A, hepatitis B and hepatitis C were all negative.  We will recheck  "hepatic panel today and further liver serology tests.    She denies having a family history of pancreatitis to the best of her knowledge.        PERTINENT PAST MEDICAL HISTORY:    Past Medical History:   Diagnosis Date     NO ACTIVE PROBLEMS        PREVIOUS SURGERIES:   Past Surgical History:   Procedure Laterality Date     NO HISTORY OF SURGERY         ALLERGIES:   No Known Allergies    PERTINENT MEDICATIONS:    Current Outpatient Medications:      Acetaminophen (TYLENOL PO), Take 650 mg by mouth as needed , Disp: , Rfl:      ibuprofen (ADVIL/MOTRIN) 600 MG tablet, Take 600 mg by mouth as needed , Disp: , Rfl:      progesterone (PROMETRIUM) 200 MG capsule, Take 1 capsule (200 mg) by mouth daily (Patient not taking: Reported on 3/7/2019), Disp: 7 capsule, Rfl: 3    FAMILY HISTORY:   Family History   Problem Relation Age of Onset     Breast Cancer Maternal Grandmother         Dx in 70s     Deep Vein Thrombosis No family hx of      Bleeding Diathesis No family hx of      Anesthesia Reaction No family hx of           ROS:    No fevers or chills  No weight loss  No blurry vision, double vision or change in vision  No sore throat  No headache, paraesthesias, or weakness in a limb  No shortness of breath or wheezing  No chest pain or pressure  No arthralgias or myalgias  No rashes or skin changes  No odynophagia or dysphagia  No BRBPR, hematochezia, melena  No dysuria, frequency or urgency  No hot/cold intolerance or polyria  No anxiety or depression  PHYSICAL EXAMINATION:  Constitutional: aaox3, cooperative, pleasant, not dyspneic/diaphoretic, no acute distress  Vitals reviewed: BP (!) 146/95 (BP Location: Left arm, Patient Position: Sitting, Cuff Size: Adult Regular)   Pulse 69   Ht 1.727 m (5' 8\")   Wt 126.3 kg (278 lb 8 oz)   SpO2 98%   BMI 42.35 kg/m     Wt:   Wt Readings from Last 2 Encounters:   05/23/19 126.3 kg (278 lb 8 oz)   05/23/19 126.6 kg (279 lb 1.6 oz)        Eyes: Sclera " anicteric/injected  Ears/nose/mouth/throat: Normal oropharynx without ulcers or exudate, mucus membranes moist, hearing intact  Neck: supple, thyroid normal size  CV: No edema  Respiratory: Unlabored breathing  Lymph: No submandibular, supraclavicular or inguinal lymphadenopathy  Abd: Nondistended, no masses, +bs, no hepatosplenomegaly, nontender, no peritoneal signs  Skin: warm, perfused, no jaundice  Psych: Normal affect  MSK: Normal gait      PERTINENT STUDIES:   Most recent CBC:  Recent Labs   Lab Test 05/06/19  0850   WBC 6.6   HGB 13.6   HCT 41.1        Most recent hepatic panel:  Recent Labs   Lab Test 05/06/19  0850   ALT 94*   AST 41     Most recent creatinine:  Recent Labs   Lab Test 05/06/19  0850   CR 0.78       RADIOLOGY:    MRCP Without and With Contrast 5/6/19     CLINICAL HISTORY: acute pancreatitis etiology unknown, 3 weeks out;  Acute pancreatitis, unspecified complication status, unspecified  pancreatitis type     DATE: 5/6/2019 2:29 PM     TECHNIQUE:      Images were acquired with and without intravenous gadolinium contrast  through the upper abdomen. The following MR images were acquired  without intravenous contrast: TrueFISP, multiplanar T2-weighted, axial  T1 in/out of phase, T2-weighted MRCP images, axial diffusion-weighted  and axial apparent diffusion coefficient. T1-weighted images were  obtained before contrast at the multiple time points following  contrast injection. 3-D reformatted images were generated by the  technologist. Contrast dose: 10ml Gadavist     Comparison study: None     FINDINGS:     Biliary Tree: No significant intra or extrahepatic biliary dilatation.  The posterior hepatic duct drains into the left hepatic duct. The  cystic duct inserts on the posterior junction of the middle and upper  thirds of the common duct. Common bile duct measures 4 mm.     Pancreas: No pancreas divisum. No significant pancreatic ductal  dilatation. Mild fatty atrophy of the pancreas  with mild diffuse  precontrast T1 signal. Mildly heterogeneous parenchymal enhancement  and a hazy increase T2 parenchymal signal. Within the uncinate process  is a ill-defined focus of T2 hyperintensity measuring approximately 8  x 7 mm (series 5, image 8) with mild restricted diffusion (series 30,  image 31).      Liver: No focal liver lesion. Scattered areas of focal fatty  deposition, for example images falciform ligament. No significant iron  deposition. Smooth capsular contour. No suspicious focal liver lesion.     Gallbladder: No cholelithiasis.     Spleen: Within normal limits. Small accessory splenule.     Kidneys: Small medial right T2 hyperintense renal cysts. No  hydronephrosis or hydroureter.     Adrenal glands: Unremarkable.     Bowel: Moderate colonic stool. No dilated loops of bowel.     Lymph nodes: Prominent anitra hepatis lymph nodes, likely reactive,  series 5 image 18.      Blood vessels: Abdominal aorta is patent and within normal limits. The  hepatic and portal veins are patent.     Lung bases: Trace pleural fluid.     Bones and soft tissues: Solitary small 8 mm T2 hyperintense enhancing  focus of the left T11 vertebral body.     Mesentery and abdominal wall: Unremarkable.     Ascites: None.                                                                      IMPRESSION:   1a. MRI findings concerning for mild changes of chronic pancreatitis  with associated mild residual acute inflammatory changes related to  recent bout of pancreatitis.   1b. Ill-defined 8 mm focus of mildly increased T2 signal and  associated mild restricted diffusion in the uncinate process,  nonspecific. Favored to represent mild residual inflammatory changes  related to recent bout of pancreatitis. Short interval follow-up of 3  months recommended.   2. No pancreas divisum. No pancreatic ductal dilatation. No  cholelithiasis or biliary dilatation.  3. Small nonspecific enhancing focus of the left T11 vertebral  body,  attention on follow-up.     [Consider Follow Up: Ill-defined nonspecific pancreatic focus in the  uncinate process. Nonspecific T11 vertebral body enhancing focus.]     This report will be copied to the [facility] to ensure a  provider acknowledges the finding.      I have personally reviewed the examination and initial interpretation  and I agree with the findings.     SEUN SANDERS MD            ASSESSMENT/PLAN:    Kelsea Murray is a 33 year old female who presents for follow up.  She was initially seen on 5/6/2019 for recent acute pancreatitis.  This was her first bout of pancreatitis for which the cause was unknown.  She is previous evaluated with an abdominal sonogram which did not reveal any gallstones.  Her triglyceride and calcium levels were found to be normal.  We further evaluated by checking IgG4 levels and several autoimmune markers which were all unremarkable. An MRCP was also performed and revealed concerns for mild chronic pancreatitis with acute inflammatory changes.  As mentioned patient had not had a previous bout of pancreatitis to the best of her knowledge.  At this time we will check for pancreatic insufficiency via stool studies and will also check fat-soluble vitamins KAILEE and K.  Recommended to have repeat MRCP in 3 months from initial MRCP (August 2019) to ensure healing and improvement of inflammatory changes. As there is concern for chronic pancreatitis, we should thereafter consider an EUS.     In regards to elevated ALT, we will recheck her hepatic panel today and check for the liver serologies as well.    Follow up in 3 months.     Abnormal magnetic resonance cholangiopancreatography (MRCP)  Elevated ALT measurement  Hx of acute pancreatitis    Orders Placed This Encounter   Procedures     MR Abdomen MRCP w/o & w Contrast     Vitamin A     Vitamin D Deficiency     Vitamin E     Vitamin K     Mitochondrial M2 Antibody IgG     F Actin EIA with reflex     Tissue  transglutaminase amy IgA and IgG     IgA     Hepatic panel     Ceruloplasmin     Pancreatic Elastase Fecal     Fat Stool Qual Random Collection           Thank you for this consultation.  It was a pleasure to participate in the care of this patient; please contact us with any further questions.  A total of 25 minutes, face to face, was spent with this patient, >50% of which was counseling regarding the above delineated issues.    This note was created with voice recognition software, and while reviewed for accuracy, typos may remain.     Fior Frye PA-C  Gastroenterology   [facility]

## 2019-05-23 NOTE — NURSING NOTE
"Kelsea Murray's goals for this visit include:   Chief Complaint   Patient presents with     RECHECK     F/U for pancreatitis; Patient reports the feeling of mild epigastric pressure/pain occassionally      She requests these members of her care team be copied on today's visit information: PCP    PCP: Be Crespo MD    Referring Provider:  No referring provider defined for this encounter.    BP (!) 146/95 (BP Location: Left arm, Patient Position: Sitting, Cuff Size: Adult Regular)   Pulse 69   Ht 1.727 m (5' 8\")   Wt 126.3 kg (278 lb 8 oz)   SpO2 98%   BMI 42.35 kg/m      Do you need any medication refills at today's visit? No    Courtney Rodriguez LPN      "

## 2019-05-24 LAB
MITOCHONDRIA M2 IGG SER-ACNC: <1 U/ML
SMA IGG SER-ACNC: 4 UNITS (ref 0–19)
TTG IGA SER-ACNC: <1 U/ML
TTG IGG SER-ACNC: <1 U/ML

## 2019-05-25 LAB
A-TOCOPHEROL VIT E SERPL-MCNC: 9.6 MG/L (ref 5.5–18)
ANNOTATION COMMENT IMP: NORMAL
BETA+GAMMA TOCOPHEROL SERPL-MCNC: 1.2 MG/L (ref 0–6)
RETINYL PALMITATE SERPL-MCNC: <0.02 MG/L (ref 0–0.1)
VIT A SERPL-MCNC: 0.4 MG/L (ref 0.3–1.2)

## 2019-05-26 LAB — PHYTONADIONE SERPL-MCNC: 0.7 NMOL/L (ref 0.22–4.88)

## 2019-05-27 ENCOUNTER — MYC MEDICAL ADVICE (OUTPATIENT)
Dept: PEDIATRICS | Facility: CLINIC | Age: 34
End: 2019-05-27

## 2019-05-28 ENCOUNTER — MYC MEDICAL ADVICE (OUTPATIENT)
Dept: PEDIATRICS | Facility: CLINIC | Age: 34
End: 2019-05-28

## 2019-06-02 PROCEDURE — 82705 FATS/LIPIDS FECES QUAL: CPT | Mod: 90 | Performed by: PHYSICIAN ASSISTANT

## 2019-06-02 PROCEDURE — 99000 SPECIMEN HANDLING OFFICE-LAB: CPT | Performed by: PHYSICIAN ASSISTANT

## 2019-06-02 PROCEDURE — 83520 IMMUNOASSAY QUANT NOS NONAB: CPT | Mod: 90 | Performed by: PHYSICIAN ASSISTANT

## 2019-06-05 ENCOUNTER — MYC MEDICAL ADVICE (OUTPATIENT)
Dept: PEDIATRICS | Facility: CLINIC | Age: 34
End: 2019-06-05

## 2019-06-05 DIAGNOSIS — K86.1 CHRONIC PANCREATITIS, UNSPECIFIED PANCREATITIS TYPE (H): Primary | ICD-10-CM

## 2019-06-05 NOTE — TELEPHONE ENCOUNTER
Sent mychart reply with explanation and clarification request. Will monitor for patient review and response.  Daryl MONSALVE CMA

## 2019-06-06 ENCOUNTER — MYC MEDICAL ADVICE (OUTPATIENT)
Dept: PEDIATRICS | Facility: CLINIC | Age: 34
End: 2019-06-06

## 2019-06-08 ENCOUNTER — MYC MEDICAL ADVICE (OUTPATIENT)
Dept: GASTROENTEROLOGY | Facility: CLINIC | Age: 34
End: 2019-06-08

## 2019-06-10 ENCOUNTER — ANCILLARY PROCEDURE (OUTPATIENT)
Dept: GENERAL RADIOLOGY | Facility: CLINIC | Age: 34
End: 2019-06-10
Attending: FAMILY MEDICINE
Payer: COMMERCIAL

## 2019-06-10 ENCOUNTER — OFFICE VISIT (OUTPATIENT)
Dept: PEDIATRICS | Facility: CLINIC | Age: 34
End: 2019-06-10
Payer: COMMERCIAL

## 2019-06-10 VITALS
OXYGEN SATURATION: 98 % | WEIGHT: 269 LBS | TEMPERATURE: 98.3 F | HEART RATE: 69 BPM | DIASTOLIC BLOOD PRESSURE: 85 MMHG | BODY MASS INDEX: 40.9 KG/M2 | SYSTOLIC BLOOD PRESSURE: 126 MMHG

## 2019-06-10 DIAGNOSIS — R63.4 WEIGHT LOSS: ICD-10-CM

## 2019-06-10 DIAGNOSIS — M54.50 ACUTE LEFT-SIDED LOW BACK PAIN WITHOUT SCIATICA: ICD-10-CM

## 2019-06-10 DIAGNOSIS — Z87.19 HX OF ACUTE PANCREATITIS: ICD-10-CM

## 2019-06-10 DIAGNOSIS — M54.50 ACUTE LEFT-SIDED LOW BACK PAIN WITHOUT SCIATICA: Primary | ICD-10-CM

## 2019-06-10 LAB
ALBUMIN SERPL-MCNC: 4 G/DL (ref 3.4–5)
ALBUMIN UR-MCNC: NEGATIVE MG/DL
ALP SERPL-CCNC: 78 U/L (ref 40–150)
ALT SERPL W P-5'-P-CCNC: 28 U/L (ref 0–50)
ANION GAP SERPL CALCULATED.3IONS-SCNC: 5 MMOL/L (ref 3–14)
APPEARANCE UR: CLEAR
AST SERPL W P-5'-P-CCNC: 32 U/L (ref 0–45)
BILIRUB SERPL-MCNC: 0.4 MG/DL (ref 0.2–1.3)
BILIRUB UR QL STRIP: NEGATIVE
BUN SERPL-MCNC: 10 MG/DL (ref 7–30)
CALCIUM SERPL-MCNC: 9 MG/DL (ref 8.5–10.1)
CHLORIDE SERPL-SCNC: 106 MMOL/L (ref 94–109)
CO2 SERPL-SCNC: 28 MMOL/L (ref 20–32)
COLOR UR AUTO: ABNORMAL
CREAT SERPL-MCNC: 0.73 MG/DL (ref 0.52–1.04)
ERYTHROCYTE [DISTWIDTH] IN BLOOD BY AUTOMATED COUNT: 11.9 % (ref 10–15)
GFR SERPL CREATININE-BSD FRML MDRD: >90 ML/MIN/{1.73_M2}
GLUCOSE SERPL-MCNC: 92 MG/DL (ref 70–99)
GLUCOSE UR STRIP-MCNC: NEGATIVE MG/DL
HCT VFR BLD AUTO: 43.5 % (ref 35–47)
HGB BLD-MCNC: 14.3 G/DL (ref 11.7–15.7)
HGB UR QL STRIP: ABNORMAL
KETONES UR STRIP-MCNC: NEGATIVE MG/DL
LEUKOCYTE ESTERASE UR QL STRIP: NEGATIVE
MCH RBC QN AUTO: 28 PG (ref 26.5–33)
MCHC RBC AUTO-ENTMCNC: 32.9 G/DL (ref 31.5–36.5)
MCV RBC AUTO: 85 FL (ref 78–100)
NITRATE UR QL: NEGATIVE
NON-SQ EPI CELLS #/AREA URNS LPF: NORMAL /LPF
PH UR STRIP: 5.5 PH (ref 5–7)
PLATELET # BLD AUTO: 330 10E9/L (ref 150–450)
POTASSIUM SERPL-SCNC: 4.4 MMOL/L (ref 3.4–5.3)
PROT SERPL-MCNC: 7.9 G/DL (ref 6.8–8.8)
RBC # BLD AUTO: 5.11 10E12/L (ref 3.8–5.2)
RBC #/AREA URNS AUTO: NORMAL /HPF
SODIUM SERPL-SCNC: 139 MMOL/L (ref 133–144)
SOURCE: ABNORMAL
SP GR UR STRIP: 1 (ref 1–1.03)
TSH SERPL DL<=0.005 MIU/L-ACNC: 2.76 MU/L (ref 0.4–4)
UROBILINOGEN UR STRIP-MCNC: NORMAL MG/DL (ref 0–2)
WBC # BLD AUTO: 6.5 10E9/L (ref 4–11)
WBC #/AREA URNS AUTO: NORMAL /HPF

## 2019-06-10 PROCEDURE — 85027 COMPLETE CBC AUTOMATED: CPT | Performed by: FAMILY MEDICINE

## 2019-06-10 PROCEDURE — 72100 X-RAY EXAM L-S SPINE 2/3 VWS: CPT | Performed by: RADIOLOGY

## 2019-06-10 PROCEDURE — 36415 COLL VENOUS BLD VENIPUNCTURE: CPT | Performed by: FAMILY MEDICINE

## 2019-06-10 PROCEDURE — 84443 ASSAY THYROID STIM HORMONE: CPT | Performed by: FAMILY MEDICINE

## 2019-06-10 PROCEDURE — 81001 URINALYSIS AUTO W/SCOPE: CPT | Performed by: FAMILY MEDICINE

## 2019-06-10 PROCEDURE — 80053 COMPREHEN METABOLIC PANEL: CPT | Performed by: FAMILY MEDICINE

## 2019-06-10 PROCEDURE — 72070 X-RAY EXAM THORAC SPINE 2VWS: CPT | Performed by: RADIOLOGY

## 2019-06-10 PROCEDURE — 99214 OFFICE O/P EST MOD 30 MIN: CPT | Performed by: FAMILY MEDICINE

## 2019-06-10 RX ORDER — CYCLOBENZAPRINE HCL 5 MG
5 TABLET ORAL
Qty: 20 TABLET | Refills: 0 | Status: SHIPPED | OUTPATIENT
Start: 2019-06-10 | End: 2019-10-07

## 2019-06-10 NOTE — PROGRESS NOTES
Subjective     Kelsea Murray is a 33 year old female who presents to clinic today for the following health issues:    HPI   Back Pain       Duration: 2 months        Specific cause: none    Description:   Location of pain: low back left  Character of pain: achey  and constant  Pain radiation:radiates into the left buttocks  New numbness or weakness in legs, not attributed to pain:  no     Intensity: mild    History:   Pain interferes with job: No  History of back problems: no prior back problems  Any previous MRI or X-rays: None  Sees a specialist for back pain:  No  Therapies tried without relief: acetaminophen (Tylenol)    Alleviating factors:   Improved by: tiger balm masks the pain      Precipitating factors:  Worsened by: when standing up, or getting out of bed.           Accompanying Signs & Symptoms:  Risk of Fracture:  None  Risk of Cauda Equina:  None  Risk of Infection:  None  Risk of Cancer:  None  Risk of Ankylosing Spondylitis:  Onset at age <35, male, AND morning back stiffness. no     She is also concerned about a 10 pound weight loss from a month ago, she admits to eating healthier but isn't exercising. She denies night sweats, history of chronic pancreatitis but work up in progress, no change in bowel habits, N/V/D,  or Gyn symptoms.         Patient Active Problem List   Diagnosis     Disturbance of skin sensation     Obesity, morbid, BMI 40.0-49.9 (H)     Past Surgical History:   Procedure Laterality Date     NO HISTORY OF SURGERY         Social History     Tobacco Use     Smoking status: Never Smoker     Smokeless tobacco: Never Used   Substance Use Topics     Alcohol use: Yes     Family History   Problem Relation Age of Onset     Breast Cancer Maternal Grandmother         Dx in 70s     Deep Vein Thrombosis No family hx of      Bleeding Diathesis No family hx of      Anesthesia Reaction No family hx of          Current Outpatient Medications   Medication Sig Dispense Refill     cyclobenzaprine  (FLEXERIL) 5 MG tablet Take 1 tablet (5 mg) by mouth nightly as needed for muscle spasms 20 tablet 0     Acetaminophen (TYLENOL PO) Take 650 mg by mouth as needed        progesterone (PROMETRIUM) 200 MG capsule Take 1 capsule (200 mg) by mouth daily (Patient not taking: Reported on 3/7/2019) 7 capsule 3         Reviewed and updated as needed this visit by Provider  Med Hx  Surg Hx  Fam Hx         Review of Systems   ROS COMP: Constitutional, HEENT, cardiovascular, pulmonary, GI, , musculoskeletal, neuro, skin, endocrine and psych systems are negative, except as otherwise noted.      Objective    /85   Pulse 69   Temp 98.3  F (36.8  C) (Temporal)   Wt 122 kg (269 lb)   SpO2 98%   BMI 40.90 kg/m    Body mass index is 40.9 kg/m .  Physical Exam   GENERAL: healthy, alert and no distress  EYES: Eyes grossly normal to inspection, PERRL and conjunctivae and sclerae normal  HENT: ear canals and TM's normal, nose and mouth without ulcers or lesions  NECK: cervical adenopathy mild anterior shotty lymph node, no asymmetry, masses, or scars and thyroid normal to palpation  RESP: lungs clear to auscultation - no rales, rhonchi or wheezes  CV: regular rate and rhythm, normal S1 S2, no S3 or S4, no murmur, click or rub, no peripheral edema and peripheral pulses strong  ABDOMEN: soft, nontender, no hepatosplenomegaly, no masses and bowel sounds normal  MS: no gross musculoskeletal defects noted, no edema    Diagnostic Test Results:  Labs reviewed in Epic        Assessment & Plan     1. Acute left-sided low back pain without sciatica  Alternate between ice/heat, over the counter NSAIDS at appropriate doses.  - XR Lumbar Spine 2/3 Views; Future  - PHYSICAL THERAPY REFERRAL; Future  - cyclobenzaprine (FLEXERIL) 5 MG tablet; Take 1 tablet (5 mg) by mouth nightly as needed for muscle spasms  Dispense: 20 tablet; Refill: 0  - *UA reflex to Microscopic and Culture ([place]; [facility]; [facility]; [facility];  [facility]; [facility]; [place]; [place])  - Urine Microscopic    2. Weight loss  Diagnostic orders as below, further plans will depend on result, continue healthy options, increase physical activity.  - CBC with platelets  - TSH with free T4 reflex  - Comprehensive metabolic panel       Return in about 2 weeks (around 6/24/2019) for recheck symptoms and back pain.    Jordi Arriaza MD  [facility]

## 2019-06-11 ENCOUNTER — MYC MEDICAL ADVICE (OUTPATIENT)
Dept: PEDIATRICS | Facility: CLINIC | Age: 34
End: 2019-06-11

## 2019-06-11 LAB
FAT STL QL: NORMAL
NEUTRAL FAT STL QL: NORMAL

## 2019-06-11 NOTE — TELEPHONE ENCOUNTER
See other mychart encounter dated 6/10/19, patient previously advised to make an appointment to be seen.

## 2019-06-12 LAB — ELASTASE PANC STL-MCNT: >500 UG/G

## 2019-06-13 ENCOUNTER — MYC MEDICAL ADVICE (OUTPATIENT)
Dept: PEDIATRICS | Facility: CLINIC | Age: 34
End: 2019-06-13

## 2019-06-13 ENCOUNTER — TELEPHONE (OUTPATIENT)
Dept: GASTROENTEROLOGY | Facility: CLINIC | Age: 34
End: 2019-06-13

## 2019-06-13 DIAGNOSIS — Z87.19 H/O ACUTE PANCREATITIS: ICD-10-CM

## 2019-06-13 NOTE — TELEPHONE ENCOUNTER
[facility] Intake form:    Referring provider : Dr. Be Crespo    Providers RN[facility] contact - Name, Phone and Fax number: none provided in referral    Requested provider (if specified): n/a     Indication/Diagnosis for consultation: Pancreatitis    Is diagnosis on list of approved diagnosis: Yes    Has patient been evaluated in clinic or had a procedure [facility] provider in the last 5 years: No  Has patient been evaluated by another Gastroenterologist? Yes, pt is seen at the [facility]    If yes, do we have access to outside records from previous GI evaluation. Recs internal      Imaging completed:     CT scan     No  MRI         Yes    Procedures:     Upper Endoscopy/EGD   No     Endoscopic Ultrasound/EUS No    ERCP      No    Colonoscopy    No    Are images able/being pushed to our system? Yes  Is patient aware of request for clinc consultation and ok to be contacted to schedule? Yes      READ TO REFERRING CLINIC:  Due to high demands for our services our clinic requires all records including imaging studies be mailed and faxed to our facility prior to scheduling. Records should be faxed within 24-72 hours of referral if possible and images should be pushed to our PACS system or received by mail within 72 hours of referral. Our office will NOT call to follow up or request records.  Our clinic will not be able to process, schedule or contact patient without records and Images for MD review process. Once records have been reviewed and recommendation/orders have been made the patient will be contacted to schedule. If records have not been received within 2 weeks of initial referral call, referring office will be notified by letter and referral will be closed. If an appointment is unable to be offered at this time we will inform the referring office and patient via letter.

## 2019-06-17 NOTE — TELEPHONE ENCOUNTER
RECORDS RECEIVED FROM: Spine, appt per pt   DATE RECEIVED: Jun 21, 2019    NOTES STATUS DETAILS   OFFICE NOTE from referring provider Internal Dr. Arriaza 6/10/19   OFFICE NOTE from other specialist N/A    DISCHARGE SUMMARY from hospital N/A    DISCHARGE REPORT from the ER N/A    OPERATIVE REPORT N/A    MEDICATION LIST Internal    IMPLANT RECORD/STICKER N/A    LABS     CBC/DIFF N/A    CULTURES N/A    INJECTIONS DONE IN RADIOLOGY N/A    MRI Internal Abdomen 5/23/19   CT SCAN N/A    XRAYS (IMAGES & REPORTS) Internal 6/10/19   TUMOR     PATHOLOGY  Slides & report N/A

## 2019-06-17 NOTE — TELEPHONE ENCOUNTER
Routing [employer] message to covering providers   to please review when able in the absence of Dr. Arriaza this week.      Ursula Martines RN, [facility]

## 2019-06-19 ASSESSMENT — ENCOUNTER SYMPTOMS
DISTURBANCES IN COORDINATION: 0
BACK PAIN: 1
TINGLING: 1
VOMITING: 0
NIGHT SWEATS: 0
NECK PAIN: 0
INCREASED ENERGY: 0
DECREASED APPETITE: 0
ALTERED TEMPERATURE REGULATION: 1
ABDOMINAL PAIN: 1
WEAKNESS: 0
HEARTBURN: 0
FEVER: 0
HEADACHES: 0
BLOATING: 1
JAUNDICE: 0
MYALGIAS: 1
POLYPHAGIA: 0
DIARRHEA: 0
LOSS OF CONSCIOUSNESS: 0
CHILLS: 0
JOINT SWELLING: 0
MEMORY LOSS: 0
BOWEL INCONTINENCE: 0
TREMORS: 0
NUMBNESS: 1
DIZZINESS: 0
MUSCLE WEAKNESS: 0
HALLUCINATIONS: 0
NAUSEA: 1
MUSCLE CRAMPS: 1
POLYDIPSIA: 0
STIFFNESS: 1
WEIGHT LOSS: 1
PARALYSIS: 0
SEIZURES: 0
BLOOD IN STOOL: 0
CONSTIPATION: 0
FATIGUE: 1
SPEECH CHANGE: 0
RECTAL PAIN: 0
ARTHRALGIAS: 1

## 2019-06-20 DIAGNOSIS — M54.50 LUMBAR PAIN: Primary | ICD-10-CM

## 2019-06-21 ENCOUNTER — ANCILLARY PROCEDURE (OUTPATIENT)
Dept: GENERAL RADIOLOGY | Facility: CLINIC | Age: 34
End: 2019-06-21
Attending: ORTHOPAEDIC SURGERY
Payer: COMMERCIAL

## 2019-06-21 ENCOUNTER — OFFICE VISIT (OUTPATIENT)
Dept: ORTHOPEDICS | Facility: CLINIC | Age: 34
End: 2019-06-21
Payer: COMMERCIAL

## 2019-06-21 ENCOUNTER — PRE VISIT (OUTPATIENT)
Dept: ORTHOPEDICS | Facility: CLINIC | Age: 34
End: 2019-06-21

## 2019-06-21 VITALS — HEIGHT: 68 IN | BODY MASS INDEX: 40.77 KG/M2 | WEIGHT: 269 LBS

## 2019-06-21 DIAGNOSIS — M54.50 LUMBAR PAIN: Primary | ICD-10-CM

## 2019-06-21 DIAGNOSIS — M54.16 LUMBAR RADICULOPATHY: Primary | ICD-10-CM

## 2019-06-21 PROBLEM — O00.109 TUBAL PREGNANCY WITHOUT INTRAUTERINE PREGNANCY: Status: ACTIVE | Noted: 2018-09-09

## 2019-06-21 PROBLEM — K85.90 ACUTE PANCREATITIS: Status: ACTIVE | Noted: 2019-04-12

## 2019-06-21 ASSESSMENT — MIFFLIN-ST. JEOR: SCORE: 1978.68

## 2019-06-21 NOTE — NURSING NOTE
"Reason For Visit:   Chief Complaint   Patient presents with     Consult     low back pain        Primary MD: Be Crespo  Ref. MD: self    ?  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  Currently working? Yes.  Work status?  Full time.  Date of injury: about a months ago and numbness about a week ago   Type of injury: chronic .  Date of surgery: none   Type of surgery: none .  Smoker: No  Request smoking cessation information: No    Ht 1.727 m (5' 8\")   Wt 122 kg (269 lb)   BMI 40.90 kg/m      Pain Assessment  Patient Currently in Pain: Yes  0-10 Pain Scale: 3  Primary Pain Location: (left sided pain around the lateral hip and lower left back )    Oswestry (BRIDGET) Questionnaire    OSWESTRY DISABILITY INDEX 6/19/2019   Count 10   Sum 9   Oswestry Score (%) 18            Neck Disability Index (NDI) Questionnaire    No flowsheet data found.                Promis 10 Assessment    PROMIS 10 6/19/2019   In general, would you say your health is: Good   In general, would you say your quality of life is: Good   In general, how would you rate your physical health? Fair   In general, how would you rate your mental health, including your mood and your ability to think? Good   In general, how would you rate your satisfaction with your social activities and relationships? Good   In general, please rate how well you carry out your usual social activities and roles Good   To what extent are you able to carry out your everyday physical activities such as walking, climbing stairs, carrying groceries, or moving a chair? Completely   How often have you been bothered by emotional problems such as feeling anxious, depressed or irritable? Rarely   How would you rate your fatigue on average? Mild   How would you rate your pain on average?   0 = No Pain  to  10 = Worst Imaginable Pain 3   In general, would you say your health is: 3   In general, would you say your quality of life is: 3   In general, how would you rate your " physical health? 2   In general, how would you rate your mental health, including your mood and your ability to think? 3   In general, how would you rate your satisfaction with your social activities and relationships? 3   In general, please rate how well you carry out your usual social activities and roles. (This includes activities at home, at work and in your community, and responsibilities as a parent, child, spouse, employee, friend, etc.) 3   To what extent are you able to carry out your everyday physical activities such as walking, climbing stairs, carrying groceries, or moving a chair? 5   In the past 7 days, how often have you been bothered by emotional problems such as feeling anxious, depressed, or irritable? 2   In the past 7 days, how would you rate your fatigue on average? 2   In the past 7 days, how would you rate your pain on average, where 0 means no pain, and 10 means worst imaginable pain? 3   Global Mental Health Score 13   Global Physical Health Score 15   PROMIS TOTAL - SUBSCORES 28                Efrain Jacob ATC

## 2019-06-21 NOTE — PROGRESS NOTES
Spine Surgery Consultation    REFERRING PHYSICIAN: Referred Self   PRIMARY CARE PHYSICIAN: Be Crespo           Chief Complaint:   Consult (low back pain )      History of Present Illness:  Symptom Profile Including: location of symptoms, onset, severity, exacerbating/alleviating factors, previous treatments:        Kelsea Murray is a 33 year old female who presents for evaluation of back pain and left buttock and thigh numbness.  This started about 6 weeks ago.  She also had an MRA done of her abdomen for a separate issue and the radiology team noted a T2 hyperintense lesion at T11 and she wanted to ask about this and see if it was anything to be worried about.    On interview today she really denies any leg pain.  At the end of the day she notices worsening numbness over the left buttock.  It does not radiate down into the foot.  She has some left-sided back pain in the mid to low portion of her back which seems to be activity related improves somewhat with rest.  She has not yet pursued any interventions.         Past Medical History:     Past Medical History:   Diagnosis Date     NO ACTIVE PROBLEMS             Past Surgical History:     Past Surgical History:   Procedure Laterality Date     NO HISTORY OF SURGERY              Social History:     Social History     Tobacco Use     Smoking status: Never Smoker     Smokeless tobacco: Never Used   Substance Use Topics     Alcohol use: Yes            Family History:     Family History   Problem Relation Age of Onset     Breast Cancer Maternal Grandmother         Dx in 70s     Deep Vein Thrombosis No family hx of      Bleeding Diathesis No family hx of      Anesthesia Reaction No family hx of             Allergies:   No Known Allergies         Medications:     Current Outpatient Medications   Medication     Acetaminophen (TYLENOL PO)     cyclobenzaprine (FLEXERIL) 5 MG tablet     progesterone (PROMETRIUM) 200 MG capsule     No current facility-administered  "medications for this visit.              Review of Systems:     A 10 point ROS was performed and reviewed. Specific responses to these questions are noted at the end of the document.         Physical Exam:   Vitals: Ht 1.727 m (5' 8\")   Wt 122 kg (269 lb)   BMI 40.90 kg/m    Constitutional: awake, alert, cooperative, no apparent distress, appears stated age.    Eyes: The sclera are white.  Ears, Nose, Throat: The trachea is midline.  Psychiatric: The patient has a normal affect.  Respiratory: breathing non-labored  Cardiovascular: The extremities are warm and perfused.  Skin: no obvious rashes or lesions.  Musculoskeletal, Neurologic, and Spine:          Lumbar Spine:    Appearance - No gross stepoffs or deformities    Motor -     L2-3: Hip flexion 5/5 R and 5/5 L strength          L3/4:  Knee extension R 5/5 and L 5/5 strength         L4/5:  Foot dorsiflexion R 5/5 L 5/5 and       EHL dorsiflexion R 5/5 L 5/5 strength         S1:  Plantarflexion/Peroneal Muscles  R 5/5 and L 5/5 strength    Sensation: intact to light touch L3-S1 distribution BLE      Neurologic:      REFLEXES Left Right                  Patella 1+ 1+   Ankle jerk 1+ 1+   Babinski No upgoing great toe No upgoing great toe   Clonus 0 beats 0 beats     Hip Exam:  No pain with hip log roll and no tenderness over the greater trochanters.    Alignment:  Patient stands with a neutral standing sagittal balance.           Imaging:   We ordered and independently reviewed new radiographs at this clinic visit. The results were discussed with the patient.  Findings include:    June 21, 2019 lateral flexion-extension lumbar radiographs.  I did compare these against the Mar[facility] Elena 10 standing lumbar films.  Preserved global alignment with spondylosis worst at L5-S1 no dynamic instability.             Assessment and Plan:   Assessment:  33 year old female with left-sided low back pain and buttock numbness of unclear etiology at this point.  She has not had any " dedicated spine imaging.  I told her is a little bit hard to see the T2 hyperintense lesion that radiology noted on the previous abdominal imaging since it is not dedicated spine imaging.     Plan:  1. Given that she is had the symptoms now for about a month and a half and that they are disabling to her I think it be worth getting some dedicated spine imaging.  I told her it is possible there is a disc herniation that could be contributing to the symptoms.  She does not have any symptoms down below the knee and it is really the left buttock and thigh but I think it is worth ruling out neurologic compression as a source of this problem.  This would give us the added benefit of getting some dedicated imaging of the lesion that radiology noted on her previous abdominal scan.  I told her I would contact her once the MRI is done and potential options at that point might include oral medications gabapentin or physical therapy and I would make recommendations based on the spine imaging.      Respectfully,  Humberto Gomez MD  Spine Surgery  [facility]      Answers for HPI/ROS submitted by the patient on 6/19/2019   General Symptoms: Yes  Skin Symptoms: No  HENT Symptoms: No  EYE SYMPTOMS: No  HEART SYMPTOMS: No  LUNG SYMPTOMS: No  INTESTINAL SYMPTOMS: Yes  URINARY SYMPTOMS: No  GYNECOLOGIC SYMPTOMS: No  BREAST SYMPTOMS: No  SKELETAL SYMPTOMS: Yes  BLOOD SYMPTOMS: No  NERVOUS SYSTEM SYMPTOMS: Yes  MENTAL HEALTH SYMPTOMS: No  Fever: No  Loss of appetite: No  Weight loss: Yes  Fatigue: Yes  Night sweats: No  Chills: No  Increased stress: No  Excessive hunger: No  Excessive thirst: No  Feeling hot or cold when others believe the temperature is normal: Yes  Loss of height: No  Post-operative complications: No  Surgical site pain: No  Hallucinations: No  Change in or Loss of Energy: No  Hyperactivity: No  Confusion: No  Heart burn or indigestion: No  Nausea: Yes  Vomiting: No  Abdominal pain: Yes  Bloating:  Yes  Constipation: No  Diarrhea: No  Blood in stool: No  Black stools: No  Rectal or Anal pain: No  Fecal incontinence: No  Yellowing of skin or eyes: No  Vomit with blood: No  Change in stools: No  Back pain: Yes  Muscle aches: Yes  Neck pain: No  Swollen joints: No  Joint pain: Yes  Bone pain: Yes  Muscle cramps: Yes  Muscle weakness: No  Joint stiffness: Yes  Trouble with coordination: No  Dizziness or trouble with balance: No  Fainting or black-out spells: No  Memory loss: No  Headache: No  Seizures: No  Speech problems: No  Tingling: Yes  Tremor: No  Weakness: No  Difficulty walking: No  Paralysis: No  Numbness: Yes

## 2019-06-21 NOTE — LETTER
6/21/2019       RE: Kelsea Murray  [address]  [place] [address]     Dear Colleague,    Thank you for referring your patient, Kelsea Murray, to the [facility]. Please see a copy of my visit note below.    Spine Surgery Consultation    REFERRING PHYSICIAN: Referred Self   PRIMARY CARE PHYSICIAN: Be Crespo           Chief Complaint:   Consult (low back pain )      History of Present Illness:  Symptom Profile Including: location of symptoms, onset, severity, exacerbating/alleviating factors, previous treatments:        Kelsea Murray is a 33 year old female who presents for evaluation of back pain and left buttock and thigh numbness.  This started about 6 weeks ago.  She also had an MRA done of her abdomen for a separate issue and the radiology team noted a T2 hyperintense lesion at T11 and she wanted to ask about this and see if it was anything to be worried about.    On interview today she really denies any leg pain.  At the end of the day she notices worsening numbness over the left buttock.  It does not radiate down into the foot.  She has some left-sided back pain in the mid to low portion of her back which seems to be activity related improves somewhat with rest.  She has not yet pursued any interventions.         Past Medical History:     Past Medical History:   Diagnosis Date     NO ACTIVE PROBLEMS             Past Surgical History:     Past Surgical History:   Procedure Laterality Date     NO HISTORY OF SURGERY              Social History:     Social History     Tobacco Use     Smoking status: Never Smoker     Smokeless tobacco: Never Used   Substance Use Topics     Alcohol use: Yes            Family History:     Family History   Problem Relation Age of Onset     Breast Cancer Maternal Grandmother         Dx in 70s     Deep Vein Thrombosis No family hx of      Bleeding Diathesis No family hx of      Anesthesia Reaction No  "family hx of             Allergies:   No Known Allergies         Medications:     Current Outpatient Medications   Medication     Acetaminophen (TYLENOL PO)     cyclobenzaprine (FLEXERIL) 5 MG tablet     progesterone (PROMETRIUM) 200 MG capsule     No current facility-administered medications for this visit.              Review of Systems:     A 10 point ROS was performed and reviewed. Specific responses to these questions are noted at the end of the document.         Physical Exam:   Vitals: Ht 1.727 m (5' 8\")   Wt 122 kg (269 lb)   BMI 40.90 kg/m     Constitutional: awake, alert, cooperative, no apparent distress, appears stated age.    Eyes: The sclera are white.  Ears, Nose, Throat: The trachea is midline.  Psychiatric: The patient has a normal affect.  Respiratory: breathing non-labored  Cardiovascular: The extremities are warm and perfused.  Skin: no obvious rashes or lesions.  Musculoskeletal, Neurologic, and Spine:          Lumbar Spine:    Appearance - No gross stepoffs or deformities    Motor -     L2-3: Hip flexion 5/5 R and 5/5 L strength          L3/4:  Knee extension R 5/5 and L 5/5 strength         L4/5:  Foot dorsiflexion R 5/5 L 5/5 and       EHL dorsiflexion R 5/5 L 5/5 strength         S1:  Plantarflexion/Peroneal Muscles  R 5/5 and L 5/5 strength    Sensation: intact to light touch L3-S1 distribution BLE      Neurologic:      REFLEXES Left Right                  Patella 1+ 1+   Ankle jerk 1+ 1+   Babinski No upgoing great toe No upgoing great toe   Clonus 0 beats 0 beats     Hip Exam:  No pain with hip log roll and no tenderness over the greater trochanters.    Alignment:  Patient stands with a neutral standing sagittal balance.           Imaging:   We ordered and independently reviewed new radiographs at this clinic visit. The results were discussed with the patient.  Findings include:    June 21, 2019 lateral flexion-extension lumbar radiographs.  I did compare these against the June 10 " standing lumbar films.  Preserved global alignment with spondylosis worst at L5-S1 no dynamic instability.             Assessment and Plan:   Assessment:  33 year old female with left-sided low back pain and buttock numbness of unclear etiology at this point.  She has not had any dedicated spine imaging.  I told her is a little bit hard to see the T2 hyperintense lesion that radiology noted on the previous abdominal imaging since it is not dedicated spine imaging.     Plan:  1. Given that she is had the symptoms now for about a month and a half and that they are disabling to her I think it be worth getting some dedicated spine imaging.  I told her it is possible there is a disc herniation that could be contributing to the symptoms.  She does not have any symptoms down below the knee and it is really the left buttock and thigh but I think it is worth ruling out neurologic compression as a source of this problem.  This would give us the added benefit of getting some dedicated imaging of the lesion that radiology noted on her previous abdominal scan.  I told her I would contact her once the MRI is done and potential options at that point might include oral medications gabapentin or physical therapy and I would make recommendations based on the spine imaging.    Respectfully,  Humberto Gomez MD  Spine Surgery  [facility]

## 2019-06-24 ENCOUNTER — ANCILLARY PROCEDURE (OUTPATIENT)
Dept: MRI IMAGING | Facility: CLINIC | Age: 34
End: 2019-06-24
Attending: ORTHOPAEDIC SURGERY
Payer: COMMERCIAL

## 2019-06-24 DIAGNOSIS — M54.16 LUMBAR RADICULOPATHY: ICD-10-CM

## 2019-06-25 ENCOUNTER — TELEPHONE (OUTPATIENT)
Dept: ORTHOPEDICS | Facility: CLINIC | Age: 34
End: 2019-06-25

## 2019-06-25 DIAGNOSIS — M54.16 LUMBAR RADICULOPATHY: Primary | ICD-10-CM

## 2019-06-25 NOTE — TELEPHONE ENCOUNTER
Called patient and offered the epidural injection and left her a number to call to schedule the injection and if the patient wanted to go somewhere else to call abck and let us know so we can get the order to them

## 2019-07-15 ENCOUNTER — MYC MEDICAL ADVICE (OUTPATIENT)
Dept: PEDIATRICS | Facility: CLINIC | Age: 34
End: 2019-07-15

## 2019-07-25 ENCOUNTER — OFFICE VISIT (OUTPATIENT)
Dept: PEDIATRICS | Facility: CLINIC | Age: 34
End: 2019-07-25
Payer: COMMERCIAL

## 2019-07-25 VITALS
TEMPERATURE: 98.6 F | WEIGHT: 257.6 LBS | BODY MASS INDEX: 39.17 KG/M2 | OXYGEN SATURATION: 100 % | DIASTOLIC BLOOD PRESSURE: 85 MMHG | SYSTOLIC BLOOD PRESSURE: 122 MMHG | HEART RATE: 56 BPM

## 2019-07-25 DIAGNOSIS — R19.7 DIARRHEA, UNSPECIFIED TYPE: ICD-10-CM

## 2019-07-25 DIAGNOSIS — R50.9 FEVER, UNSPECIFIED FEVER CAUSE: Primary | ICD-10-CM

## 2019-07-25 DIAGNOSIS — Z87.19 HISTORY OF ACUTE PANCREATITIS: ICD-10-CM

## 2019-07-25 LAB
ALBUMIN SERPL-MCNC: 3.8 G/DL (ref 3.4–5)
ALBUMIN UR-MCNC: NEGATIVE MG/DL
ALP SERPL-CCNC: 61 U/L (ref 40–150)
ALT SERPL W P-5'-P-CCNC: 26 U/L (ref 0–50)
ANION GAP SERPL CALCULATED.3IONS-SCNC: 6 MMOL/L (ref 3–14)
APPEARANCE UR: CLEAR
AST SERPL W P-5'-P-CCNC: 17 U/L (ref 0–45)
BASOPHILS # BLD AUTO: 0.1 10E9/L (ref 0–0.2)
BASOPHILS NFR BLD AUTO: 1.1 %
BILIRUB SERPL-MCNC: 0.2 MG/DL (ref 0.2–1.3)
BILIRUB UR QL STRIP: NEGATIVE
BUN SERPL-MCNC: 13 MG/DL (ref 7–30)
CALCIUM SERPL-MCNC: 8.8 MG/DL (ref 8.5–10.1)
CHLORIDE SERPL-SCNC: 108 MMOL/L (ref 94–109)
CO2 SERPL-SCNC: 26 MMOL/L (ref 20–32)
COLOR UR AUTO: YELLOW
CREAT SERPL-MCNC: 0.68 MG/DL (ref 0.52–1.04)
DIFFERENTIAL METHOD BLD: NORMAL
EOSINOPHIL # BLD AUTO: 0.1 10E9/L (ref 0–0.7)
EOSINOPHIL NFR BLD AUTO: 1.7 %
ERYTHROCYTE [DISTWIDTH] IN BLOOD BY AUTOMATED COUNT: 12.5 % (ref 10–15)
GFR SERPL CREATININE-BSD FRML MDRD: >90 ML/MIN/{1.73_M2}
GLUCOSE SERPL-MCNC: 90 MG/DL (ref 70–99)
GLUCOSE UR STRIP-MCNC: NEGATIVE MG/DL
HCT VFR BLD AUTO: 41.3 % (ref 35–47)
HGB BLD-MCNC: 13.5 G/DL (ref 11.7–15.7)
HGB UR QL STRIP: NEGATIVE
IMM GRANULOCYTES # BLD: 0 10E9/L (ref 0–0.4)
IMM GRANULOCYTES NFR BLD: 0.4 %
KETONES UR STRIP-MCNC: NEGATIVE MG/DL
LEUKOCYTE ESTERASE UR QL STRIP: NEGATIVE
LIPASE SERPL-CCNC: 246 U/L (ref 73–393)
LYMPHOCYTES # BLD AUTO: 2.2 10E9/L (ref 0.8–5.3)
LYMPHOCYTES NFR BLD AUTO: 27.1 %
MCH RBC QN AUTO: 28 PG (ref 26.5–33)
MCHC RBC AUTO-ENTMCNC: 32.7 G/DL (ref 31.5–36.5)
MCV RBC AUTO: 86 FL (ref 78–100)
MONOCYTES # BLD AUTO: 0.7 10E9/L (ref 0–1.3)
MONOCYTES NFR BLD AUTO: 9.1 %
NEUTROPHILS # BLD AUTO: 4.9 10E9/L (ref 1.6–8.3)
NEUTROPHILS NFR BLD AUTO: 60.6 %
NITRATE UR QL: NEGATIVE
PH UR STRIP: 7 PH (ref 5–7)
PLATELET # BLD AUTO: 309 10E9/L (ref 150–450)
POTASSIUM SERPL-SCNC: 4.4 MMOL/L (ref 3.4–5.3)
PROT SERPL-MCNC: 7.4 G/DL (ref 6.8–8.8)
RBC # BLD AUTO: 4.82 10E12/L (ref 3.8–5.2)
SODIUM SERPL-SCNC: 140 MMOL/L (ref 133–144)
SOURCE: NORMAL
SP GR UR STRIP: 1.01 (ref 1–1.03)
UROBILINOGEN UR STRIP-MCNC: NORMAL MG/DL (ref 0–2)
WBC # BLD AUTO: 8.1 10E9/L (ref 4–11)

## 2019-07-25 PROCEDURE — 83690 ASSAY OF LIPASE: CPT | Performed by: FAMILY MEDICINE

## 2019-07-25 PROCEDURE — 81003 URINALYSIS AUTO W/O SCOPE: CPT | Performed by: FAMILY MEDICINE

## 2019-07-25 PROCEDURE — 36415 COLL VENOUS BLD VENIPUNCTURE: CPT | Performed by: FAMILY MEDICINE

## 2019-07-25 PROCEDURE — 80053 COMPREHEN METABOLIC PANEL: CPT | Performed by: FAMILY MEDICINE

## 2019-07-25 PROCEDURE — 85025 COMPLETE CBC W/AUTO DIFF WBC: CPT | Performed by: FAMILY MEDICINE

## 2019-07-25 PROCEDURE — 99213 OFFICE O/P EST LOW 20 MIN: CPT | Performed by: FAMILY MEDICINE

## 2019-07-25 NOTE — PROGRESS NOTES
Ashok Murray is a 34 year old female who presents to clinic today for the following health issues:    HPI   Patient presenting with concerns for subjective fever, chills and acute on chronic diarrhea since this morning. She has a history of idiopathic pancreatitis with inflmmation at the uncinate with recent unremarkable labs, MRCP and EGD. She denies upper respiratory infection symptoms, recent travels or sick contacts.      Reviewed and updated as needed this visit by Provider         Review of Systems   ROS COMP: Constitutional, HEENT, cardiovascular, pulmonary, GI, , musculoskeletal, neuro, skin, endocrine and psych systems are negative, except as otherwise noted.      Objective    /85   Pulse 56   Temp 98.6  F (37  C)   Wt 116.8 kg (257 lb 9.6 oz)   LMP 06/08/2019   SpO2 100%   BMI 39.17 kg/m    Body mass index is 39.17 kg/m .  Physical Exam   GENERAL: healthy, alert and no distress  EYES: Eyes grossly normal to inspection, PERRL and conjunctivae and sclerae normal  HENT: ear canals and TM's normal, nose and mouth without ulcers or lesions  NECK: no adenopathy, no asymmetry, masses, or scars and thyroid normal to palpation  RESP: lungs clear to auscultation - no rales, rhonchi or wheezes  CV: regular rate and rhythm, normal S1 S2, no S3 or S4, no murmur, click or rub, no peripheral edema and peripheral pulses strong  ABDOMEN: soft, nontender, no hepatosplenomegaly, no masses and bowel sounds normal  MS: no gross musculoskeletal defects noted, no edema    Results for orders placed or performed in visit on 07/25/19   CBC with platelets and differential   Result Value Ref Range    WBC 8.1 4.0 - 11.0 10e9/L    RBC Count 4.82 3.8 - 5.2 10e12/L    Hemoglobin 13.5 11.7 - 15.7 g/dL    Hematocrit 41.3 35.0 - 47.0 %    MCV 86 78 - 100 fl    MCH 28.0 26.5 - 33.0 pg    MCHC 32.7 31.5 - 36.5 g/dL    RDW 12.5 10.0 - 15.0 %    Platelet Count 309 150 - 450 10e9/L    Diff Method Automated Method      % Neutrophils 60.6 %    % Lymphocytes 27.1 %    % Monocytes 9.1 %    % Eosinophils 1.7 %    % Basophils 1.1 %    % Immature Granulocytes 0.4 %    Absolute Neutrophil 4.9 1.6 - 8.3 10e9/L    Absolute Lymphocytes 2.2 0.8 - 5.3 10e9/L    Absolute Monocytes 0.7 0.0 - 1.3 10e9/L    Absolute Eosinophils 0.1 0.0 - 0.7 10e9/L    Absolute Basophils 0.1 0.0 - 0.2 10e9/L    Abs Immature Granulocytes 0.0 0 - 0.4 10e9/L   Comprehensive metabolic panel   Result Value Ref Range    Sodium 140 133 - 144 mmol/L    Potassium 4.4 3.4 - 5.3 mmol/L    Chloride 108 94 - 109 mmol/L    Carbon Dioxide 26 20 - 32 mmol/L    Anion Gap 6 3 - 14 mmol/L    Glucose 90 70 - 99 mg/dL    Urea Nitrogen 13 7 - 30 mg/dL    Creatinine 0.68 0.52 - 1.04 mg/dL    GFR Estimate >90 >60 mL/min/[1.73_m2]    GFR Estimate If Black >90 >60 mL/min/[1.73_m2]    Calcium 8.8 8.5 - 10.1 mg/dL    Bilirubin Total 0.2 0.2 - 1.3 mg/dL    Albumin 3.8 3.4 - 5.0 g/dL    Protein Total 7.4 6.8 - 8.8 g/dL    Alkaline Phosphatase 61 40 - 150 U/L    ALT 26 0 - 50 U/L    AST 17 0 - 45 U/L   UA reflex to Microscopic ([facility]; [facility])   Result Value Ref Range    Color Urine Yellow     Appearance Urine Clear     Glucose Urine Negative NEG^Negative mg/dL    Bilirubin Urine Negative NEG^Negative    Ketones Urine Negative NEG^Negative mg/dL    Specific Gravity Urine 1.009 1.003 - 1.035    Blood Urine Negative NEG^Negative    pH Urine 7.0 5.0 - 7.0 pH    Protein Albumin Urine Negative NEG^Negative mg/dL    Urobilinogen mg/dL Normal 0.0 - 2.0 mg/dL    Nitrite Urine Negative NEG^Negative    Leukocyte Esterase Urine Negative NEG^Negative    Source Midstream Urine              Assessment & Plan     1. Fever, unspecified fever cause  Temperature is within normal limits at the clinic and exam is unremarkable, Further plans will dpend on clinical status and results but reassured patient I don't think she has an ongoing infection.  - CBC with platelets and differential  -  Comprehensive metabolic panel  - UA reflex to Microscopic ([facility])    2. Diarrhea, unspecified type  BRAT diet to advance as tolerated, Encouraged to push fluids.  - CBC with platelets and differential  - Comprehensive metabolic panel  - Enteric Bacteria and Virus Panel by NUPUR Stool; Future  - Cryptosporidium Antigen by EIA Stool; Future    3. History of acute pancreatitis  - Lipase       Return if symptoms worsen or fail to improve.    Jordi Arriaza MD  [facility]

## 2019-08-23 ENCOUNTER — ANCILLARY PROCEDURE (OUTPATIENT)
Dept: MRI IMAGING | Facility: CLINIC | Age: 34
End: 2019-08-23
Attending: PHYSICIAN ASSISTANT
Payer: COMMERCIAL

## 2019-08-23 DIAGNOSIS — R93.3 ABNORMAL MAGNETIC RESONANCE CHOLANGIOPANCREATOGRAPHY (MRCP): ICD-10-CM

## 2019-08-23 DIAGNOSIS — Z87.19 HX OF ACUTE PANCREATITIS: ICD-10-CM

## 2019-08-23 PROCEDURE — A9585 GADOBUTROL INJECTION: HCPCS | Performed by: PHYSICIAN ASSISTANT

## 2019-08-23 PROCEDURE — 74183 MRI ABD W/O CNTR FLWD CNTR: CPT | Performed by: RADIOLOGY

## 2019-08-23 RX ORDER — GADOBUTROL 604.72 MG/ML
10 INJECTION INTRAVENOUS ONCE
Status: COMPLETED | OUTPATIENT
Start: 2019-08-23 | End: 2019-08-23

## 2019-08-23 RX ADMIN — GADOBUTROL 10 ML: 604.72 INJECTION INTRAVENOUS at 10:08

## 2019-08-27 ENCOUNTER — MYC MEDICAL ADVICE (OUTPATIENT)
Dept: GASTROENTEROLOGY | Facility: CLINIC | Age: 34
End: 2019-08-27

## 2019-08-27 DIAGNOSIS — K86.1 CHRONIC PANCREATITIS, UNSPECIFIED PANCREATITIS TYPE (H): Primary | ICD-10-CM

## 2019-10-07 ENCOUNTER — OFFICE VISIT (OUTPATIENT)
Dept: OBGYN | Facility: CLINIC | Age: 34
End: 2019-10-07
Payer: COMMERCIAL

## 2019-10-07 ENCOUNTER — ANCILLARY PROCEDURE (OUTPATIENT)
Dept: ULTRASOUND IMAGING | Facility: CLINIC | Age: 34
End: 2019-10-07
Attending: OBSTETRICS & GYNECOLOGY
Payer: COMMERCIAL

## 2019-10-07 VITALS
BODY MASS INDEX: 36.68 KG/M2 | SYSTOLIC BLOOD PRESSURE: 128 MMHG | WEIGHT: 242 LBS | HEIGHT: 68 IN | HEART RATE: 73 BPM | DIASTOLIC BLOOD PRESSURE: 75 MMHG

## 2019-10-07 DIAGNOSIS — N92.6 IRREGULAR MENSES: Primary | ICD-10-CM

## 2019-10-07 DIAGNOSIS — N92.6 IRREGULAR MENSES: ICD-10-CM

## 2019-10-07 PROCEDURE — 76830 TRANSVAGINAL US NON-OB: CPT | Performed by: RADIOLOGY

## 2019-10-07 PROCEDURE — 76856 US EXAM PELVIC COMPLETE: CPT | Mod: 59 | Performed by: RADIOLOGY

## 2019-10-07 PROCEDURE — 99214 OFFICE O/P EST MOD 30 MIN: CPT | Performed by: OBSTETRICS & GYNECOLOGY

## 2019-10-07 ASSESSMENT — MIFFLIN-ST. JEOR: SCORE: 1851.2

## 2019-10-07 NOTE — PROGRESS NOTES
"Kelsea is a 34 year old   here for follow up of irregular cycles.  The timing has gotten irregular and the length of bleeding is increased.  She has spotting for up to 10-15 days, with her cycle increasing within that spotting.  ROS: No urinary frequency or dysuria, bladder or kidney problems, POSITIVE for:, irregular menses, heavy periods, Negative for urinary frequency, urgency, hematuria, PREMENSTRUAL SYNDROME symptoms  ROS: Ten point review of systems was reviewed and negative except the above.    PMH: Her past medical, surgical, and obstetric histories were reviewed and are documented in their appropriate chart areas.    ALL/Meds: Her medication and allergy histories were reviewed and are documented in their appropriate chart areas.    SH/FMH: Reviewed and documented in the appropriate area.    PE: /75   Pulse 73   Ht 1.727 m (5' 8\")   Wt 109.8 kg (242 lb)   LMP 2019   BMI 36.80 kg/m      She has been tracking ovulation and hasn't seen evidence of ovulation.  I explained how a normal menstrual cycle is controlled by ovulation.  How the the increase in estrogen levels within the first few days of a new month will cause both endometrial growth and follicular development.  That it is this growth of the endometrium that will ultimately cause the bleeding to cease.    I explained how the dominant follicle will proceed, along with continued endometrial growth during the second week of the month.  How ovulation is triggered but a sudden surge of LH at roughly mid-cycle.    We discussed that progesterone is only produced in significant levels after ovulation and that it is this progesterone that shifts the endometrium from the growth to the secretory phase.      I explained how this production of progesterone ceases after 7 days unless there are levels of HCG being produced.  How this cessation of progesterone leads to spiral artery spasm, hypoxia, ischemia and finally sloughing of the " endometrium.  I also explain how the lack of ovulation can lead to break-through bleeding or skipped cycles.     A/P:   Kelsea presents with (N92.6) Irregular menses  (primary encounter diagnosis)  Comment: Out of 30 minutes spent face to face with the patient, > 50% of this was spent in consultation.  Plan: Estradiol, Follicle stimulating hormone,         Lutropin, Prolactin, Testosterone Free and         Total, TSH with free T4 reflex, Progesterone,         US Pelvic Complete w Transvaginal         -  (N92.6) Irregular menses  (primary encounter diagnosis)  Comment:   Plan: Estradiol, Follicle stimulating hormone,         Lutropin, Prolactin, Testosterone Free and         Total, TSH with free T4 reflex, Progesterone        Pelvic ultrasound      No orders of the defined types were placed in this encounter.        Wilfred Flores MD FACOG

## 2019-10-07 NOTE — PATIENT INSTRUCTIONS
Count menstrual days from the first day the bleeding is heavy enough to need a pad.    First set of labs on day 3, 4, or 5.  Second lab draw on menstrual day 21,22, or 23

## 2019-10-10 DIAGNOSIS — N92.6 IRREGULAR MENSES: ICD-10-CM

## 2019-10-10 LAB
ESTRADIOL SERPL-MCNC: 40 PG/ML
FSH SERPL-ACNC: 6.9 IU/L
LH SERPL-ACNC: 7.4 IU/L
PROLACTIN SERPL-MCNC: 10 UG/L (ref 3–27)
TSH SERPL DL<=0.005 MIU/L-ACNC: 1.89 MU/L (ref 0.4–4)

## 2019-10-10 PROCEDURE — 82670 ASSAY OF TOTAL ESTRADIOL: CPT | Performed by: OBSTETRICS & GYNECOLOGY

## 2019-10-10 PROCEDURE — 84443 ASSAY THYROID STIM HORMONE: CPT | Performed by: OBSTETRICS & GYNECOLOGY

## 2019-10-10 PROCEDURE — 84146 ASSAY OF PROLACTIN: CPT | Performed by: OBSTETRICS & GYNECOLOGY

## 2019-10-10 PROCEDURE — 36415 COLL VENOUS BLD VENIPUNCTURE: CPT | Performed by: OBSTETRICS & GYNECOLOGY

## 2019-10-10 PROCEDURE — 83002 ASSAY OF GONADOTROPIN (LH): CPT | Performed by: OBSTETRICS & GYNECOLOGY

## 2019-10-10 PROCEDURE — 83001 ASSAY OF GONADOTROPIN (FSH): CPT | Performed by: OBSTETRICS & GYNECOLOGY

## 2019-10-10 PROCEDURE — 84403 ASSAY OF TOTAL TESTOSTERONE: CPT | Performed by: OBSTETRICS & GYNECOLOGY

## 2019-10-10 PROCEDURE — 84270 ASSAY OF SEX HORMONE GLOBUL: CPT | Performed by: OBSTETRICS & GYNECOLOGY

## 2019-10-12 LAB
SHBG SERPL-SCNC: 26 NMOL/L (ref 30–135)
TESTOST FREE SERPL-MCNC: 0.16 NG/DL (ref 0.13–0.92)
TESTOST SERPL-MCNC: 8 NG/DL (ref 8–60)

## 2019-10-28 DIAGNOSIS — N92.6 IRREGULAR MENSES: ICD-10-CM

## 2019-10-28 LAB — PROGEST SERPL-MCNC: 17.1 NG/ML

## 2019-10-28 PROCEDURE — 84144 ASSAY OF PROGESTERONE: CPT | Performed by: OBSTETRICS & GYNECOLOGY

## 2019-10-28 PROCEDURE — 36415 COLL VENOUS BLD VENIPUNCTURE: CPT | Performed by: OBSTETRICS & GYNECOLOGY

## 2019-11-06 ENCOUNTER — OFFICE VISIT (OUTPATIENT)
Dept: FAMILY MEDICINE | Facility: CLINIC | Age: 34
End: 2019-11-06
Payer: COMMERCIAL

## 2019-11-06 VITALS
RESPIRATION RATE: 20 BRPM | BODY MASS INDEX: 35.64 KG/M2 | HEIGHT: 68 IN | WEIGHT: 235.2 LBS | SYSTOLIC BLOOD PRESSURE: 118 MMHG | HEART RATE: 78 BPM | DIASTOLIC BLOOD PRESSURE: 78 MMHG | OXYGEN SATURATION: 98 % | TEMPERATURE: 98.2 F

## 2019-11-06 DIAGNOSIS — N91.2 AMENORRHEA: Primary | ICD-10-CM

## 2019-11-06 DIAGNOSIS — Z32.01 PREGNANCY TEST POSITIVE: ICD-10-CM

## 2019-11-06 DIAGNOSIS — Z87.59 HISTORY OF ECTOPIC PREGNANCY: ICD-10-CM

## 2019-11-06 LAB — HCG UR QL: POSITIVE

## 2019-11-06 PROCEDURE — 81025 URINE PREGNANCY TEST: CPT | Performed by: PREVENTIVE MEDICINE

## 2019-11-06 PROCEDURE — 99213 OFFICE O/P EST LOW 20 MIN: CPT | Performed by: PREVENTIVE MEDICINE

## 2019-11-06 ASSESSMENT — PAIN SCALES - GENERAL: PAINLEVEL: NO PAIN (0)

## 2019-11-06 ASSESSMENT — MIFFLIN-ST. JEOR: SCORE: 1820.36

## 2019-11-06 NOTE — PROGRESS NOTES
Subjective     Kelsea Murray is a 34 year old female who presents to clinic today for the following health issues:    HPI     -Presents for pregnancy confirmation. Tested positive with at home testing        Here for confirmation of pregnancy.  No abdominal pain, slight lower abdominal cramping+, no dysuria or frequency, no vaginal spotting or bleeding.  No nausea or emesis.  Taking prenatal vitamins.  No use of tobacco or alcohol. Has appointment with Ob GYN scheduled for 11/22/19    History of ectopic last year  Was advised that if she conceived again and ultrasound would need to be checked         Patient Active Problem List   Diagnosis     Disturbance of skin sensation     Obesity, morbid, BMI 40.0-49.9 (H)     H/O acute pancreatitis     Lumbar radiculopathy     Acute pancreatitis     Tubal pregnancy without intrauterine pregnancy     Personal history of other diseases of the digestive system     Neck pain     Past Surgical History:   Procedure Laterality Date     ANKLE SURGERY Right 09/2018     LAPAROSCOPY DIAGNOSTIC (GYN)  09/2018    Ectopic       Social History     Tobacco Use     Smoking status: Never Smoker     Smokeless tobacco: Never Used   Substance Use Topics     Alcohol use: Yes     Family History   Problem Relation Age of Onset     Breast Cancer Maternal Grandmother         Dx in 70s     Deep Vein Thrombosis No family hx of      Bleeding Diathesis No family hx of      Anesthesia Reaction No family hx of          No current outpatient medications on file.     No Known Allergies  BP Readings from Last 3 Encounters:   11/06/19 118/78   10/07/19 128/75   07/25/19 122/85    Wt Readings from Last 3 Encounters:   11/06/19 106.7 kg (235 lb 3.2 oz)   10/07/19 109.8 kg (242 lb)   07/25/19 116.8 kg (257 lb 9.6 oz)             Reviewed and updated as needed this visit by Provider  Tobacco  Allergies  Meds  Problems  Med Hx  Surg Hx  Fam Hx         Review of Systems   ROS COMP: Constitutional, HEENT,  "cardiovascular, pulmonary, gi and gu systems are negative, except as otherwise noted.      Objective    /78 (BP Location: Left arm, Patient Position: Chair, Cuff Size: Adult Large)   Pulse 78   Temp 98.2  F (36.8  C) (Oral)   Resp 20   Ht 1.727 m (5' 8\")   Wt 106.7 kg (235 lb 3.2 oz)   LMP 10/07/2019 (Exact Date)   SpO2 98%   Breastfeeding? No   BMI 35.76 kg/m    Body mass index is 35.76 kg/m .  Physical Exam   GENERAL APPEARANCE: healthy, alert and no distress  EYES: Eyes grossly normal to inspection and conjunctivae and sclerae normal  RESP: lungs clear to auscultation - no rales, rhonchi or wheezes  CV: regular rates and rhythm, normal S1 S2, no S3 or S4 and no murmur, click or rub  ABDOMEN: soft, non-tender and no rebound or guarding   MS: extremities normal- no gross deformities noted and peripheral pulses normal  SKIN: no suspicious lesions or rashes  NEURO: Normal strength and tone, mentation intact and speech normal  PSYCH: mentation appears normal      Diagnostic Test Results:  Labs reviewed in Epic  Results for orders placed or performed in visit on 19 (from the past 24 hour(s))   HCG Qual, Urine ([id number])   Result Value Ref Range    HCG Qual Urine Positive (A) NEG^Negative           Assessment & Plan     Kelsea was seen today for confirmation of pregnancy.    Diagnoses and all orders for this visit:    Amenorrhea  -     HCG Qual, Urine ([id number])    Pregnancy test positive  -     US OB < 14 Weeks Single; Future  -will get Flu vaccine at work  -  -Estimated Due Date is 2020 Gestational Age Today is 4 week(s), 2 day(s) or 0.99 month(s) months.    History of ectopic pregnancy  -     US OB < 14 Weeks Single; Future  -I ordered an ultrasound  -per OB recommendations to be done at 7-8 weeks of gestation          BMI:   Estimated body mass index is 35.76 kg/m  as calculated from the following:    Height as of this encounter: 1.727 m (5' 8\").    Weight as of this encounter: 106.7 kg " (235 lb 3.2 oz).       Return in about 2 months (around 1/6/2020) for Routine Visit with Ob Gyn .    Skylar Coles MD MPH    [facility]

## 2019-11-11 ENCOUNTER — MYC MEDICAL ADVICE (OUTPATIENT)
Dept: OBGYN | Facility: CLINIC | Age: 34
End: 2019-11-11

## 2019-11-11 DIAGNOSIS — Z87.59 HISTORY OF ECTOPIC PREGNANCY: Primary | ICD-10-CM

## 2019-11-18 DIAGNOSIS — Z87.59 HISTORY OF ECTOPIC PREGNANCY: ICD-10-CM

## 2019-11-18 LAB — B-HCG SERPL-ACNC: 6803 IU/L (ref 0–5)

## 2019-11-18 PROCEDURE — 84702 CHORIONIC GONADOTROPIN TEST: CPT | Performed by: OBSTETRICS & GYNECOLOGY

## 2019-11-18 PROCEDURE — 36415 COLL VENOUS BLD VENIPUNCTURE: CPT | Performed by: OBSTETRICS & GYNECOLOGY

## 2019-11-18 NOTE — TELEPHONE ENCOUNTER
"Patient had pregnancy confirmation done on 11/6/2019- states she has a history of ectopic pregnancies and wants to have hCG quants done.     Per OV note from FP pregnancy confirmation- \"History of ectopic last year  Was advised that if she conceived again and ultrasound would need to be checked \"    A ultra sound is scheduled for 11/21/19 and patient is scheduled with Dr. Ocasio on 11/22/2019.    RN will route to OB provider for advisement.     Bita Corea RN on 11/18/2019 at 12:03 PM    "

## 2019-11-21 ENCOUNTER — ANCILLARY PROCEDURE (OUTPATIENT)
Dept: ULTRASOUND IMAGING | Facility: CLINIC | Age: 34
End: 2019-11-21
Attending: PREVENTIVE MEDICINE
Payer: COMMERCIAL

## 2019-11-21 DIAGNOSIS — Z87.59 HISTORY OF ECTOPIC PREGNANCY: ICD-10-CM

## 2019-11-21 DIAGNOSIS — Z32.01 PREGNANCY TEST POSITIVE: ICD-10-CM

## 2019-11-21 PROCEDURE — 76817 TRANSVAGINAL US OBSTETRIC: CPT | Performed by: RADIOLOGY

## 2019-11-21 PROCEDURE — 76801 OB US < 14 WKS SINGLE FETUS: CPT | Performed by: RADIOLOGY

## 2019-11-22 ENCOUNTER — PRENATAL OFFICE VISIT (OUTPATIENT)
Dept: OBGYN | Facility: CLINIC | Age: 34
End: 2019-11-22
Payer: COMMERCIAL

## 2019-11-22 VITALS
HEIGHT: 68 IN | DIASTOLIC BLOOD PRESSURE: 78 MMHG | BODY MASS INDEX: 36.77 KG/M2 | HEART RATE: 80 BPM | SYSTOLIC BLOOD PRESSURE: 122 MMHG | WEIGHT: 242.6 LBS

## 2019-11-22 DIAGNOSIS — Z34.80 SUPERVISION OF OTHER NORMAL PREGNANCY, ANTEPARTUM: Primary | ICD-10-CM

## 2019-11-22 DIAGNOSIS — Z23 NEED FOR PROPHYLACTIC VACCINATION AND INOCULATION AGAINST INFLUENZA: ICD-10-CM

## 2019-11-22 PROBLEM — Z87.19 PERSONAL HISTORY OF OTHER DISEASES OF THE DIGESTIVE SYSTEM: Status: RESOLVED | Noted: 2019-06-13 | Resolved: 2019-11-22

## 2019-11-22 PROBLEM — K85.90 ACUTE PANCREATITIS: Status: RESOLVED | Noted: 2019-04-12 | Resolved: 2019-11-22

## 2019-11-22 PROBLEM — Z87.19 H/O ACUTE PANCREATITIS: Status: RESOLVED | Noted: 2019-06-13 | Resolved: 2019-11-22

## 2019-11-22 PROBLEM — O00.109 TUBAL PREGNANCY WITHOUT INTRAUTERINE PREGNANCY: Status: RESOLVED | Noted: 2018-09-09 | Resolved: 2019-11-22

## 2019-11-22 LAB
ABO + RH BLD: NORMAL
ABO + RH BLD: NORMAL
ALBUMIN UR-MCNC: NEGATIVE MG/DL
APPEARANCE UR: CLEAR
BASOPHILS # BLD AUTO: 0.1 10E9/L (ref 0–0.2)
BASOPHILS NFR BLD AUTO: 0.9 %
BILIRUB UR QL STRIP: NEGATIVE
BLD GP AB SCN SERPL QL: NORMAL
BLOOD BANK CMNT PATIENT-IMP: NORMAL
COLOR UR AUTO: ABNORMAL
DIFFERENTIAL METHOD BLD: NORMAL
EOSINOPHIL # BLD AUTO: 0.3 10E9/L (ref 0–0.7)
EOSINOPHIL NFR BLD AUTO: 3.2 %
ERYTHROCYTE [DISTWIDTH] IN BLOOD BY AUTOMATED COUNT: 12 % (ref 10–15)
GLUCOSE UR STRIP-MCNC: NEGATIVE MG/DL
HBA1C MFR BLD: 4.6 % (ref 0–5.6)
HCT VFR BLD AUTO: 38.5 % (ref 35–47)
HGB BLD-MCNC: 12.9 G/DL (ref 11.7–15.7)
HGB UR QL STRIP: NEGATIVE
IMM GRANULOCYTES # BLD: 0 10E9/L (ref 0–0.4)
IMM GRANULOCYTES NFR BLD: 0.3 %
KETONES UR STRIP-MCNC: NEGATIVE MG/DL
LEUKOCYTE ESTERASE UR QL STRIP: NEGATIVE
LYMPHOCYTES # BLD AUTO: 2 10E9/L (ref 0.8–5.3)
LYMPHOCYTES NFR BLD AUTO: 22.7 %
MCH RBC QN AUTO: 28.6 PG (ref 26.5–33)
MCHC RBC AUTO-ENTMCNC: 33.5 G/DL (ref 31.5–36.5)
MCV RBC AUTO: 85 FL (ref 78–100)
MONOCYTES # BLD AUTO: 0.8 10E9/L (ref 0–1.3)
MONOCYTES NFR BLD AUTO: 9.6 %
NEUTROPHILS # BLD AUTO: 5.5 10E9/L (ref 1.6–8.3)
NEUTROPHILS NFR BLD AUTO: 63.3 %
NITRATE UR QL: NEGATIVE
PH UR STRIP: 7 PH (ref 5–7)
PLATELET # BLD AUTO: 284 10E9/L (ref 150–450)
RBC # BLD AUTO: 4.51 10E12/L (ref 3.8–5.2)
RUBV IGG SERPL IA-ACNC: 19 IU/ML
SOURCE: ABNORMAL
SP GR UR STRIP: 1 (ref 1–1.03)
SPECIMEN EXP DATE BLD: NORMAL
T PALLIDUM AB SER QL: NONREACTIVE
UROBILINOGEN UR STRIP-MCNC: NORMAL MG/DL (ref 0–2)
WBC # BLD AUTO: 8.8 10E9/L (ref 4–11)

## 2019-11-22 PROCEDURE — 86901 BLOOD TYPING SEROLOGIC RH(D): CPT | Performed by: OBSTETRICS & GYNECOLOGY

## 2019-11-22 PROCEDURE — 81003 URINALYSIS AUTO W/O SCOPE: CPT | Performed by: OBSTETRICS & GYNECOLOGY

## 2019-11-22 PROCEDURE — 83036 HEMOGLOBIN GLYCOSYLATED A1C: CPT | Performed by: OBSTETRICS & GYNECOLOGY

## 2019-11-22 PROCEDURE — 85025 COMPLETE CBC W/AUTO DIFF WBC: CPT | Performed by: OBSTETRICS & GYNECOLOGY

## 2019-11-22 PROCEDURE — 87086 URINE CULTURE/COLONY COUNT: CPT | Performed by: OBSTETRICS & GYNECOLOGY

## 2019-11-22 PROCEDURE — 86900 BLOOD TYPING SEROLOGIC ABO: CPT | Performed by: OBSTETRICS & GYNECOLOGY

## 2019-11-22 PROCEDURE — 87624 HPV HI-RISK TYP POOLED RSLT: CPT | Performed by: OBSTETRICS & GYNECOLOGY

## 2019-11-22 PROCEDURE — 36415 COLL VENOUS BLD VENIPUNCTURE: CPT | Performed by: OBSTETRICS & GYNECOLOGY

## 2019-11-22 PROCEDURE — 90686 IIV4 VACC NO PRSV 0.5 ML IM: CPT | Performed by: OBSTETRICS & GYNECOLOGY

## 2019-11-22 PROCEDURE — 87389 HIV-1 AG W/HIV-1&-2 AB AG IA: CPT | Performed by: OBSTETRICS & GYNECOLOGY

## 2019-11-22 PROCEDURE — G0145 SCR C/V CYTO,THINLAYER,RESCR: HCPCS | Performed by: OBSTETRICS & GYNECOLOGY

## 2019-11-22 PROCEDURE — 87340 HEPATITIS B SURFACE AG IA: CPT | Performed by: OBSTETRICS & GYNECOLOGY

## 2019-11-22 PROCEDURE — 86762 RUBELLA ANTIBODY: CPT | Performed by: OBSTETRICS & GYNECOLOGY

## 2019-11-22 PROCEDURE — 90471 IMMUNIZATION ADMIN: CPT | Performed by: OBSTETRICS & GYNECOLOGY

## 2019-11-22 PROCEDURE — 86780 TREPONEMA PALLIDUM: CPT | Performed by: OBSTETRICS & GYNECOLOGY

## 2019-11-22 PROCEDURE — 86850 RBC ANTIBODY SCREEN: CPT | Performed by: OBSTETRICS & GYNECOLOGY

## 2019-11-22 PROCEDURE — 99207 ZZC COMPLICATED OB VISIT: CPT | Performed by: OBSTETRICS & GYNECOLOGY

## 2019-11-22 RX ORDER — PRENATAL VIT/IRON FUM/FOLIC AC 27MG-0.8MG
1 TABLET ORAL DAILY
COMMUNITY
End: 2021-11-22

## 2019-11-22 ASSESSMENT — MIFFLIN-ST. JEOR: SCORE: 1853.93

## 2019-11-22 NOTE — PROGRESS NOTES
34 year old  at 6w4d presents for New OB appointment.  Estimated Date of Delivery: 2020 by LMP which is consistent with ultrasound at 6 weeks.     No complaints.   No vaginal bleeding, no leaking fluid, no contractions.      Electronic chart, including labs and imaging reviewed.     Last PHQ-9 score on record= No flowsheet data found.    Obstetric History  OB History    Para Term  AB Living   2 0 0 0 1 0   SAB TAB Ectopic Multiple Live Births   0 0 1 0 0      # Outcome Date GA Lbr Suhas/2nd Weight Sex Delivery Anes PTL Lv   2 Current            1 Ectopic 2018               Last Pap:  at [facility]  History of abnormal Pap: None      Most Recent Immunizations   Administered Date(s) Administered     DTaP, Unspecified 1988     Hib, Unspecified 1988     Influenza Vaccine IM > 6 months Valent IIV4 2019     MMR 1987     OPV, trivalent, live 1988     TDAP Vaccine (Boostrix) 2017        Patient Active Problem List   Diagnosis     Disturbance of skin sensation     Obesity, morbid, BMI 40.0-49.9 (H)     Lumbar radiculopathy     Supervision of other normal pregnancy, antepartum       Current Outpatient Medications   Medication     Docosahexaenoic Acid (DHA OMEGA 3 PO)     Prenatal Vit-Fe Fumarate-FA (PRENATAL MULTIVITAMIN W/IRON) 27-0.8 MG tablet     No current facility-administered medications for this visit.        No Known Allergies    History  Past Medical History:   Diagnosis Date     Acute pancreatitis 2019     Tubal pregnancy without intrauterine pregnancy 2018     Past Surgical History:   Procedure Laterality Date     ANKLE SURGERY Right 2018     LAPAROSCOPY DIAGNOSTIC (GYN)  2018    Ectopic       Social History     Socioeconomic History     Marital status:      Spouse name: Not on file     Number of children: Not on file     Years of education: Not on file     Highest education level: Not on file   Occupational History     Not  "on file   Social Needs     Financial resource strain: Not on file     Food insecurity:     Worry: Not on file     Inability: Not on file     Transportation needs:     Medical: Not on file     Non-medical: Not on file   Tobacco Use     Smoking status: Never Smoker     Smokeless tobacco: Never Used   Substance and Sexual Activity     Alcohol use: Yes     Drug use: No     Sexual activity: Yes     Partners: Male     Birth control/protection: None   Lifestyle     Physical activity:     Days per week: Not on file     Minutes per session: Not on file     Stress: Not on file   Relationships     Social connections:     Talks on phone: Not on file     Gets together: Not on file     Attends [employer] service: Not on file     Active member of club or organization: Not on file     Attends meetings of clubs or organizations: Not on file     Relationship status: Not on file     Intimate partner violence:     Fear of current or ex partner: Not on file     Emotionally abused: Not on file     Physically abused: Not on file     Forced sexual activity: Not on file   Other Topics Concern     Parent/sibling w/ CABG, MI or angioplasty before 65F 55M? Not Asked   Social History Narrative     Not on file     Family History    Medical History Relation Name Comments   Heart Disease Father       Hyperlipidemia Father       Hypertension Father       Other Maternal Aunt   melanoma   Cancer-breast Maternal Grandmother   dx in 60's   Hyperlipidemia Mother       Other Mother   migraines   Other Paternal Grandfather   lung cancer     Relation Name Status Comments         ROS - Please see HPI, otherwise 10pt ROS negative.      Physical Exam  Vitals: /78 (BP Location: Right arm, Cuff Size: Adult Large)   Pulse 80   Ht 1.727 m (5' 8\")   Wt 110 kg (242 lb 9.6 oz)   LMP 10/07/2019 (Exact Date)   BMI 36.89 kg/m    BMI= Body mass index is 36.89 kg/m .  Gen: Alert and oriented times 3, no acute distress.  Well developed, well nourished, pleasant. "    Neck: Supple, no masses.  No thyromegaly.  Chest:  Non labored.  Clear to auscultation bilaterally.    Heart: Regular, normal S1, S2.  No murmurs.   Abdomen: Soft, nontender, nondistended.  No palpable masses.    :  Normal female external genitalia, Edvin stage V.  No lesions.  Speculum exam reveals a normal vaginal vault, normal cervix.  No abnormal discharge.  Bimanual exam reveals a normal, mobile, nontender uterus, size consistent with dates.  No cervical motion tenderness.  Adnexa nontender with no palpable masses. Exam limited by body habitus  Extremities:  Nontender, no edema.    Pap obtained Yes        Assessment and Plan:  34 year old  with IUP at 6w4d.        ICD-10-CM    1. Supervision of other normal pregnancy, antepartum Z34.80 ABO/Rh type and screen     CBC with platelets differential     HIV Antigen Antibody Combo     Rubella Antibody IgG Quantitative     Hepatitis B surface antigen     Treponema Abs w Reflex to RPR and Titer     Urine Culture Aerobic Bacterial     Pap imaged thin layer screen with HPV - recommended age 30 - 65 years (select HPV order below)     HPV High Risk Types DNA Cervical     UA reflex to Microscopic ([facility])     Hemoglobin A1c   2. Need for prophylactic vaccination and inoculation against influenza Z23 INFLUENZA VACCINE IM > 6 MONTHS VALENT IIV4 [32313]     Vaccine Administration, Initial [08112]       Discuss genetic screening options next visit when she is 10 weeks.   Ordered labs.     Folder given, outlining physician coverage, exercise, weight gain, schedule of visits, routine and indicated ultrasounds, prenatal vitamins and childbirth education.    Body mass index is 36.89 kg/m . - recommend 11-20 lbs (BMI >30)     Discussed aspirin use in pregnancy.  Low-dose aspirin prophylaxis can be beneficial in women at high risk of developing preeclampsia.  I generally recommend we begin aspirin at about 12-13 weeks gestation and continue until at  least 36 weeks.  Women with at least one of the following conditions are considered high risk for developing preeclampsia: Previous pregnancy with preeclampsia,  multifetal-gestation, chronic hypertension, diabetes mellitus, chronic kidney disease, autoimmune disease.  Women with more than 1 of the following conditions may also consider low-dose aspirin prophylaxis in pregnancy: Nulliparity, BMI greater than 30, family history of preeclampsia (mother or sister), AMA, socio-demographic characteristics, personal risk factors.    Patient does  meet the above criteria.  Discussed risks and benefits of low dose Asprin therapy and she elects to proceed.     Gadolinium exposure when she was about two weeks pregnant, which should not be an issue as she would have just conceived.     Serial growth due to obesity    Return in 4 weeks for routine OB care      30 minutes was spent face to face with the patient today discussing her history, diagnosis, and follow-up plan as noted above.  Over 50% of the visit was spent in counseling and coordination of care.    Roseline Ocasio MD FACOG

## 2019-11-22 NOTE — RESULT ENCOUNTER NOTE
Kelsea,     The ultrasound is showing a pregnancy in the uterus.   I see that you are scheduled to see GYN today, further review can be done by your Gynecologist.     Please do not hesitate to call us at [phone] if you have any questions or concerns.    Thank you,    Skylar Coles MD MPH   
ROM intact/strength intact

## 2019-11-22 NOTE — NURSING NOTE
"Chief Complaint   Patient presents with     Prenatal Care     New Prenatal       Initial /78 (BP Location: Right arm, Cuff Size: Adult Large)   Pulse 80   Ht 1.727 m (5' 8\")   Wt 110 kg (242 lb 9.6 oz)   LMP 10/07/2019 (Exact Date)   BMI 36.89 kg/m   Estimated body mass index is 36.89 kg/m  as calculated from the following:    Height as of this encounter: 1.727 m (5' 8\").    Weight as of this encounter: 110 kg (242 lb 9.6 oz).  BP completed using cuff size:Large        Roseline Luke MA on 2019 at 9:16 AM        "

## 2019-11-23 LAB
BACTERIA SPEC CULT: NORMAL
SPECIMEN SOURCE: NORMAL

## 2019-11-26 LAB
HBV SURFACE AG SERPL QL IA: NONREACTIVE
HIV 1+2 AB+HIV1 P24 AG SERPL QL IA: NONREACTIVE

## 2019-11-27 LAB
COPATH REPORT: NORMAL
PAP: NORMAL

## 2019-12-01 ENCOUNTER — MYC MEDICAL ADVICE (OUTPATIENT)
Dept: OBGYN | Facility: CLINIC | Age: 34
End: 2019-12-01

## 2019-12-02 LAB
FINAL DIAGNOSIS: NORMAL
HPV HR 12 DNA CVX QL NAA+PROBE: NEGATIVE
HPV16 DNA SPEC QL NAA+PROBE: NEGATIVE
HPV18 DNA SPEC QL NAA+PROBE: NEGATIVE
SPECIMEN DESCRIPTION: NORMAL
SPECIMEN SOURCE CVX/VAG CYTO: NORMAL

## 2019-12-12 ENCOUNTER — TELEPHONE (OUTPATIENT)
Dept: MIDWIFE SERVICES | Facility: CLINIC | Age: 34
End: 2019-12-12

## 2019-12-12 ENCOUNTER — ANCILLARY PROCEDURE (OUTPATIENT)
Dept: ULTRASOUND IMAGING | Facility: CLINIC | Age: 34
End: 2019-12-12
Attending: ADVANCED PRACTICE MIDWIFE
Payer: COMMERCIAL

## 2019-12-12 ENCOUNTER — MYC MEDICAL ADVICE (OUTPATIENT)
Dept: MIDWIFE SERVICES | Facility: CLINIC | Age: 34
End: 2019-12-12

## 2019-12-12 ENCOUNTER — MYC MEDICAL ADVICE (OUTPATIENT)
Dept: OBGYN | Facility: CLINIC | Age: 34
End: 2019-12-12

## 2019-12-12 DIAGNOSIS — N94.89 UTERINE CRAMPING: Primary | ICD-10-CM

## 2019-12-12 DIAGNOSIS — N94.89 UTERINE CRAMPING: ICD-10-CM

## 2019-12-12 PROCEDURE — 76801 OB US < 14 WKS SINGLE FETUS: CPT | Performed by: STUDENT IN AN ORGANIZED HEALTH CARE EDUCATION/TRAINING PROGRAM

## 2019-12-12 NOTE — TELEPHONE ENCOUNTER
Patient scheduled an appointment with Zuleika Castro CNM at 3:15PM today via "[employer]hart for the following:      Cramping and sudden disappearance of my pregnancy symptoms    Routing to review symptoms prior to appointment.

## 2019-12-12 NOTE — LETTER
[facility]  [address] [address] [place] [id number]  Phone: [phone]    12/12/19    Kelsea Murray  [address] [facility] [address]      To whom it may concern:     Kelsea Murray is pregnant with an Estimated Date of Delivery: Jul 13, 2020.  She should be allowed to abstain from blood draws and other biohazard exposures during her pregnancy given her recent needle stick exposure, associated anxiety related to potential future exposures, and increased risk to pregnancy.   Refrain from being required to complete home visits during this time due to required potential biohazard exposure and increased risks of unknown origin including inclement weather, and increased risk of falls.   Refrain from travel to other locations (participant homes) during the winter months when road and walking path conditions are not known and may cause an increased risk of falls.     Please contact my office at the number listed above with concerns.        Sincerely,      Roseline Ocasio MD

## 2019-12-12 NOTE — TELEPHONE ENCOUNTER
Do you want her to have a pelvic US? Or just keep things as scheduled an see Dr. Ocasio unless she has bleeding?

## 2019-12-12 NOTE — TELEPHONE ENCOUNTER
Spoke with patient on phone. She is quite anxious about the uterine cramping she is experiencing combined with a loss of pregnancy symptoms. She has a h/o ectopic pregnancy. Order in for ultrasound to confirm viability to be done ASAP. Will call patient with results once received in inbox. She has an appointment scheduled on 12/20 with Dr Ocasio. Pt agrees with plan and verbalizes understanding. Zuleika Castro CNM

## 2019-12-20 ENCOUNTER — PRENATAL OFFICE VISIT (OUTPATIENT)
Dept: OBGYN | Facility: CLINIC | Age: 34
End: 2019-12-20
Payer: COMMERCIAL

## 2019-12-20 VITALS
DIASTOLIC BLOOD PRESSURE: 74 MMHG | HEART RATE: 84 BPM | WEIGHT: 244.9 LBS | SYSTOLIC BLOOD PRESSURE: 115 MMHG | BODY MASS INDEX: 37.24 KG/M2

## 2019-12-20 DIAGNOSIS — Z34.80 SUPERVISION OF OTHER NORMAL PREGNANCY, ANTEPARTUM: Primary | ICD-10-CM

## 2019-12-20 PROCEDURE — 99207 ZZC PRENATAL VISIT: CPT | Performed by: OBSTETRICS & GYNECOLOGY

## 2019-12-20 ASSESSMENT — PATIENT HEALTH QUESTIONNAIRE - PHQ9: SUM OF ALL RESPONSES TO PHQ QUESTIONS 1-9: 0

## 2019-12-20 NOTE — NURSING NOTE
"Chief Complaint   Patient presents with     Prenatal Care     10w4d       Initial /74 (BP Location: Right arm, Cuff Size: Adult Large)   Pulse 84   Wt 111.1 kg (244 lb 14.4 oz)   LMP 10/07/2019 (Exact Date)   BMI 37.24 kg/m   Estimated body mass index is 37.24 kg/m  as calculated from the following:    Height as of 19: 1.727 m (5' 8\").    Weight as of this encounter: 111.1 kg (244 lb 14.4 oz).  BP completed using cuff size: regular        Roseline Luke MA on 2019 at 12:33 PM        "

## 2019-12-20 NOTE — PROGRESS NOTES
Presents for routine  appointment.    Mild nausea.  Constipation, stool softeners help.    No LOF/VB/Ctxs.    ROS:   and GI  negative.     Please see Prenatal Vitals and Notes Flowsheet for objective data.    A/P:  34 year old  at 10w4d       ICD-10-CM    1. Supervision of other normal pregnancy, antepartum Z34.80        Genetic screening declined  Serial growth due to obesity  Plan ASA 81 mg for preeclampsia prevention due to risk factors in 2 weeks.   Follow up in 4  weeks.      Roseline Ocasio MD

## 2020-01-07 ENCOUNTER — MYC MEDICAL ADVICE (OUTPATIENT)
Dept: OBGYN | Facility: CLINIC | Age: 35
End: 2020-01-07

## 2020-01-09 NOTE — TELEPHONE ENCOUNTER
HCG quantitative 11/18/19: 6,803    ED visit for light bleeding 1/7/2020: patient states bleeding was due to her subchorionic hemorrhage     Subchorionic hemorrhage: this is a small blood clot that formed where the baby implanted in the lining of the uterus which will likely resolve.  This is very common and can be a cause of first trimester vaginal spotting.  It almost always resolves by the 20 week ultrasound.     Recommend pelvic rest for now by avoiding sexual intercourse or vigorous exercise. Will plan follow-up for this as needed.     Aline Otto RN on 1/9/2020 at 8:25 AM

## 2020-01-22 ENCOUNTER — MYC MEDICAL ADVICE (OUTPATIENT)
Dept: OBGYN | Facility: CLINIC | Age: 35
End: 2020-01-22

## 2020-01-24 ENCOUNTER — PRENATAL OFFICE VISIT (OUTPATIENT)
Dept: OBGYN | Facility: CLINIC | Age: 35
End: 2020-01-24
Payer: COMMERCIAL

## 2020-01-24 VITALS
DIASTOLIC BLOOD PRESSURE: 75 MMHG | HEART RATE: 80 BPM | WEIGHT: 247.3 LBS | BODY MASS INDEX: 37.6 KG/M2 | SYSTOLIC BLOOD PRESSURE: 113 MMHG

## 2020-01-24 DIAGNOSIS — Z34.80 SUPERVISION OF OTHER NORMAL PREGNANCY, ANTEPARTUM: Primary | ICD-10-CM

## 2020-01-24 PROCEDURE — 99207 ZZC PRENATAL VISIT: CPT | Performed by: OBSTETRICS & GYNECOLOGY

## 2020-01-24 ASSESSMENT — PATIENT HEALTH QUESTIONNAIRE - PHQ9: SUM OF ALL RESPONSES TO PHQ QUESTIONS 1-9: 2

## 2020-01-24 NOTE — PROGRESS NOTES
Presents for routine  appointment.    Headaches. Daily for the last two weeks.  Not severe, just a dull lingering headache.  Does not need tylenol.  Dizzy spell yesterday.  Better now.   Bleeding has stopped.  Some cramping after she goes to the bathroom.   No LOF/VB/Ctxs.    ROS:   and GI  negative.     Please see Prenatal Vitals and Notes Flowsheet for objective data.    A/P:  34 year old  at 15w4d       ICD-10-CM    1. Supervision of other normal pregnancy, antepartum Z34.80 US OB > 14 Weeks       She will try tylenol and let us know if her headache do not improve.   Genetic screening declined  20 week ultrasound ordered  Serial growth due to obesity  Plan ASA 81 mg for preeclampsia prevention due to risk factors   Follow up in 4  week(s).      Roseline Ocasio MD

## 2020-01-24 NOTE — NURSING NOTE
"Chief Complaint   Patient presents with     Prenatal Care     15w4d       Initial /75 (BP Location: Right arm, Cuff Size: Adult Large)   Pulse 80   Wt 112.2 kg (247 lb 4.8 oz)   LMP 10/07/2019 (Exact Date)   BMI 37.60 kg/m   Estimated body mass index is 37.6 kg/m  as calculated from the following:    Height as of 19: 1.727 m (5' 8\").    Weight as of this encounter: 112.2 kg (247 lb 4.8 oz).  BP completed using cuff size: regular        PHQ-9 score:    PHQ-9 SCORE 2019   PHQ-9 Total Score 0       Roseline Luke MA on 2020 at 2:00 PM      "

## 2020-02-21 ENCOUNTER — ANCILLARY PROCEDURE (OUTPATIENT)
Dept: ULTRASOUND IMAGING | Facility: CLINIC | Age: 35
End: 2020-02-21
Attending: OBSTETRICS & GYNECOLOGY
Payer: COMMERCIAL

## 2020-02-21 DIAGNOSIS — Z34.80 SUPERVISION OF OTHER NORMAL PREGNANCY, ANTEPARTUM: ICD-10-CM

## 2020-02-21 PROCEDURE — 76805 OB US >/= 14 WKS SNGL FETUS: CPT | Performed by: RADIOLOGY

## 2020-02-28 ENCOUNTER — PRENATAL OFFICE VISIT (OUTPATIENT)
Dept: OBGYN | Facility: CLINIC | Age: 35
End: 2020-02-28
Payer: COMMERCIAL

## 2020-02-28 VITALS
HEART RATE: 84 BPM | WEIGHT: 254.4 LBS | BODY MASS INDEX: 38.68 KG/M2 | SYSTOLIC BLOOD PRESSURE: 122 MMHG | DIASTOLIC BLOOD PRESSURE: 81 MMHG

## 2020-02-28 DIAGNOSIS — Z34.80 SUPERVISION OF OTHER NORMAL PREGNANCY, ANTEPARTUM: Primary | ICD-10-CM

## 2020-02-28 PROCEDURE — 99207 ZZC COMPLICATED OB VISIT: CPT | Performed by: OBSTETRICS & GYNECOLOGY

## 2020-02-28 NOTE — NURSING NOTE
"Chief Complaint   Patient presents with     Prenatal Care     20w4d       Initial /81 (BP Location: Right arm, Cuff Size: Adult Regular)   Pulse 84   Wt 115.4 kg (254 lb 6.4 oz)   LMP 10/07/2019 (Exact Date)   BMI 38.68 kg/m   Estimated body mass index is 38.68 kg/m  as calculated from the following:    Height as of 19: 1.727 m (5' 8\").    Weight as of this encounter: 115.4 kg (254 lb 6.4 oz).  BP completed using cuff size: regular        PHQ-9 score:    PHQ-9 SCORE 2020   PHQ-9 Total Score 2         Roseline Luke MA on 2020 at 8:58 AM    "

## 2020-02-28 NOTE — PROGRESS NOTES
Presents for routine  appointment.     No complaints.  Daily headaches stopped a couple of days ago.  Still tired.   No abnormal discharge , no leaking fluid , no contractions , no vaginal bleeding    ROS:   and GI  negative.     Please see Prenatal Vitals and Notes Flowsheet for objective data.    A/P:  34 year old  at 20w4d       ICD-10-CM    1. Supervision of other normal pregnancy, antepartum Z34.80 Glucose tolerance gest screen 1 hour     Treponema Abs w Reflex to RPR and Titer     Hemoglobin       Discussed ultrasound results - follow up ultrasound scheduled Mar 6.    Plan serial growth US due to obesity - due  or so  Continue low dose aspirin to reduce risk of preeclampsia.   GCT, hgb and anti-treponema testing  after 24 weeks   Follow up in 4  weeks.      Roseline Ocasio MD

## 2020-03-06 ENCOUNTER — ANCILLARY PROCEDURE (OUTPATIENT)
Dept: ULTRASOUND IMAGING | Facility: CLINIC | Age: 35
End: 2020-03-06
Attending: OBSTETRICS & GYNECOLOGY
Payer: COMMERCIAL

## 2020-03-06 PROCEDURE — 76816 OB US FOLLOW-UP PER FETUS: CPT

## 2020-03-11 ENCOUNTER — HEALTH MAINTENANCE LETTER (OUTPATIENT)
Age: 35
End: 2020-03-11

## 2020-03-15 ENCOUNTER — MYC MEDICAL ADVICE (OUTPATIENT)
Dept: OBGYN | Facility: CLINIC | Age: 35
End: 2020-03-15

## 2020-03-15 NOTE — LETTER
[address] [address]  Phone: [phone]    03/16/20    Kelsea Murray  [address] [facility] [address]      To whom it may concern:     Kelsea Murray is pregnant with an Estimated Date of Delivery: Jul 13, 2020.  I recommend social distancing in alignment with current CDC recommendations.    I recommend she work from home until further notice.      Sincerely,      Roseline Ocasio MD

## 2020-03-17 NOTE — TELEPHONE ENCOUNTER
Left message for patient to return call to clinic.  When she call back please see if patient can come 4/3/2020 instead of this Friday.    Dorothy Rice, [facility]  March 17, 2020

## 2020-03-17 NOTE — TELEPHONE ENCOUNTER
Letter completed and sent.    MA - please move her appointment to April 3 at 11 am.  Patient is aware.

## 2020-03-18 ENCOUNTER — MYC MEDICAL ADVICE (OUTPATIENT)
Dept: OBGYN | Facility: CLINIC | Age: 35
End: 2020-03-18

## 2020-03-19 NOTE — TELEPHONE ENCOUNTER
Letter printed, signed and placed at . Notified patient via [employer] communication.     Bita Corea RN on 3/19/2020 at 10:54 AM

## 2020-03-19 NOTE — TELEPHONE ENCOUNTER
Patient will  at [facility] Friday 3/20/2020.    Roseline Luke MA on 3/19/2020 at 9:45 AM

## 2020-04-03 ENCOUNTER — VIRTUAL VISIT (OUTPATIENT)
Dept: OBGYN | Facility: CLINIC | Age: 35
End: 2020-04-03
Payer: COMMERCIAL

## 2020-04-03 DIAGNOSIS — Z34.82 ENCOUNTER FOR SUPERVISION OF OTHER NORMAL PREGNANCY IN SECOND TRIMESTER: Primary | ICD-10-CM

## 2020-04-03 DIAGNOSIS — O99.210 OBESITY IN PREGNANCY, ANTEPARTUM: ICD-10-CM

## 2020-04-03 PROCEDURE — 99207 ZZC COMPLICATED OB VISIT: CPT | Mod: TEL | Performed by: OBSTETRICS & GYNECOLOGY

## 2020-04-03 NOTE — PROGRESS NOTES
"Kelsea Murray is a 34 year old female who is being evaluated via a billable telephone visit.      The patient has been notified of following:     \"This telephone visit will be conducted via a call between you and your physician/provider. We have found that certain health care needs can be provided without the need for a physical exam.  This service lets us provide the care you need with a short phone conversation.  If a prescription is necessary we can send it directly to your pharmacy.  If lab work is needed we can place an order for that and you can then stop by our lab to have the test done at a later time.    If during the course of the call the physician/provider feels a telephone visit is not appropriate, you will not be charged for this service.\"     Patient has given verbal consent for Telephone visit?  Yes    Kelsea Murray complains of    Chief Complaint   Patient presents with     Prenatal Care     25w4d       I have reviewed and updated the patient's Past Medical History, Social History, Family History and Medication List.    ALLERGIES  Patient has no known allergies.      Presents for routine  appointment.     No complaints aside from occasional mild back pain.    No abnormal discharge , no leaking fluid , no contractions , no vaginal bleeding    ROS:   and GI  negative.     Please see Prenatal Vitals and Notes Flowsheet for objective data.    A/P:  34 year old  at 25w4d       ICD-10-CM    1. Encounter for supervision of other normal pregnancy in second trimester  Z34.82    2. Obesity in pregnancy, antepartum  O99.210 US OB >14 Weeks Follow Up       Continue baby aspirin for preeclampsia prevention  1 hr glucola next visit.   TDAP  next visit.    Discussed signs and symptoms of  labor  Follow up when she is about 28 weeks.  Labs at that time.       Roseline Ocasio MD          Phone call duration: 10 minutes    "

## 2020-04-10 ENCOUNTER — ANCILLARY PROCEDURE (OUTPATIENT)
Dept: ULTRASOUND IMAGING | Facility: CLINIC | Age: 35
End: 2020-04-10
Attending: OBSTETRICS & GYNECOLOGY
Payer: COMMERCIAL

## 2020-04-10 ENCOUNTER — MYC MEDICAL ADVICE (OUTPATIENT)
Dept: OBGYN | Facility: CLINIC | Age: 35
End: 2020-04-10

## 2020-04-10 DIAGNOSIS — O99.210 OBESITY IN PREGNANCY, ANTEPARTUM: ICD-10-CM

## 2020-04-10 PROCEDURE — 76816 OB US FOLLOW-UP PER FETUS: CPT | Performed by: RADIOLOGY

## 2020-04-10 NOTE — TELEPHONE ENCOUNTER
"Per US result 4/10/2020:  \"Hi,     Your ultrasound is normal.  Please let me know if you have any questions or concerns.     Thanks!   Dr. Ocasio\"      Aline Otto RN on 4/10/2020 at 10:44 AM    "

## 2020-04-17 ENCOUNTER — PRENATAL OFFICE VISIT (OUTPATIENT)
Dept: OBGYN | Facility: CLINIC | Age: 35
End: 2020-04-17
Payer: COMMERCIAL

## 2020-04-17 VITALS
SYSTOLIC BLOOD PRESSURE: 129 MMHG | BODY MASS INDEX: 40.11 KG/M2 | WEIGHT: 263.8 LBS | HEART RATE: 80 BPM | DIASTOLIC BLOOD PRESSURE: 85 MMHG

## 2020-04-17 DIAGNOSIS — Z34.80 SUPERVISION OF OTHER NORMAL PREGNANCY, ANTEPARTUM: Primary | ICD-10-CM

## 2020-04-17 DIAGNOSIS — Z23 NEED FOR VACCINATION: ICD-10-CM

## 2020-04-17 DIAGNOSIS — O99.210 OBESITY IN PREGNANCY, ANTEPARTUM: ICD-10-CM

## 2020-04-17 LAB
GLUCOSE 1H P 50 G GLC PO SERPL-MCNC: 104 MG/DL (ref 60–129)
HGB BLD-MCNC: 12.9 G/DL (ref 11.7–15.7)

## 2020-04-17 PROCEDURE — 36415 COLL VENOUS BLD VENIPUNCTURE: CPT | Performed by: OBSTETRICS & GYNECOLOGY

## 2020-04-17 PROCEDURE — 90471 IMMUNIZATION ADMIN: CPT | Performed by: OBSTETRICS & GYNECOLOGY

## 2020-04-17 PROCEDURE — 86780 TREPONEMA PALLIDUM: CPT | Performed by: OBSTETRICS & GYNECOLOGY

## 2020-04-17 PROCEDURE — 82950 GLUCOSE TEST: CPT | Performed by: OBSTETRICS & GYNECOLOGY

## 2020-04-17 PROCEDURE — 99207 ZZC COMPLICATED OB VISIT: CPT | Performed by: OBSTETRICS & GYNECOLOGY

## 2020-04-17 PROCEDURE — 85018 HEMOGLOBIN: CPT | Performed by: OBSTETRICS & GYNECOLOGY

## 2020-04-17 PROCEDURE — 90715 TDAP VACCINE 7 YRS/> IM: CPT | Performed by: OBSTETRICS & GYNECOLOGY

## 2020-04-17 NOTE — PROGRESS NOTES
Presents for routine  appointment.     No complaints.    No abnormal discharge , no leaking fluid , no contractions , no vaginal bleeding    ROS:   and GI  negative.     Please see Prenatal Vitals and Notes Flowsheet for objective data.  Hemoglobin   Date Value Ref Range Status   2019 12.9 11.7 - 15.7 g/dL Final     Lab Results   Component Value Date    ABO A 2019    RH Pos 2019       A/P:  34 year old  at 27w4d       ICD-10-CM    1. Supervision of other normal pregnancy, antepartum  Z34.80 Glucose tolerance gest screen 1 hour     Treponema Abs w Reflex to RPR and Titer     Hemoglobin   2. Obesity in pregnancy, antepartum  O99.210 US OB >14 Weeks Follow Up       Continue baby aspirin for preeclampsia prevention  Plan serial growth US due to obesity.  EFW 53% /10 at 26w 4d. Repeat at 32 wks.   GCT today   TDAP today.    Rhogam: not  needed  Discussed kick counts   Follow up in 3 weeks.  Virtual visit ok.        Roseline Ocasio MD

## 2020-04-17 NOTE — NURSING NOTE
Prior to immunization administration, verified patients identity using patient s name and date of birth. Please see Immunization Activity for additional information.     Screening Questionnaire for Adult Immunization    Are you sick today?   No   Do you have allergies to medications, food, a vaccine component or latex?   No   Have you ever had a serious reaction after receiving a vaccination?   No   Do you have a long-term health problem with heart, lung, kidney, or metabolic disease (e.g., diabetes), asthma, a blood disorder, no spleen, complement component deficiency, a cochlear implant, or a spinal fluid leak?  Are you on long-term aspirin therapy?   No   Do you have cancer, leukemia, HIV/AIDS, or any other immune system problem?   No   Do you have a parent, brother, or sister with an immune system problem?   No   In the past 3 months, have you taken medications that affect  your immune system, such as prednisone, other steroids, or anticancer drugs; drugs for the treatment of rheumatoid arthritis, Crohn s disease, or psoriasis; or have you had radiation treatments?   No   Have you had a seizure, or a brain or other nervous system problem?   No   During the past year, have you received a transfusion of blood or blood    products, or been given immune (gamma) globulin or antiviral drug?   No   For women: Are you pregnant or is there a chance you could become       pregnant during the next month?   Yes   Have you received any vaccinations in the past 4 weeks?   No     Immunization questionnaire was positive for at least one answer.  Notified .        Per orders of Dr. Ocasio, injection of Tdap (Adacel) given by Roseline Luke MA. Patient instructed to remain in clinic for 15 minutes afterwards, and to report any adverse reaction to me immediately.       Screening performed by Roseline Luke MA on 4/17/2020 at 4:12 PM.

## 2020-04-17 NOTE — NURSING NOTE
"Chief Complaint   Patient presents with     Prenatal Care     27w4d       Initial /85 (BP Location: Right arm, Cuff Size: Adult Large)   Pulse 80   Wt 119.7 kg (263 lb 12.8 oz)   LMP 10/07/2019 (Exact Date)   BMI 40.11 kg/m   Estimated body mass index is 40.11 kg/m  as calculated from the following:    Height as of 19: 1.727 m (5' 8\").    Weight as of this encounter: 119.7 kg (263 lb 12.8 oz).  BP completed using cuff size: regular        PHQ-9 score:    PHQ-9 SCORE 2020   PHQ-9 Total Score 2         Roseline Luke MA on 2020 at 3:23 PM    "

## 2020-04-18 LAB — T PALLIDUM AB SER QL: NONREACTIVE

## 2020-05-14 ENCOUNTER — MYC MEDICAL ADVICE (OUTPATIENT)
Dept: OBGYN | Facility: CLINIC | Age: 35
End: 2020-05-14

## 2020-05-14 NOTE — TELEPHONE ENCOUNTER
"RN called and spoke with pt.    Pt's Bps and last hemoglobin were in normal range.    Pt states for the last hour she has been feeling \"internally shakey\" and can see her arms and hands shaking.     Pt denies current headache, vision change, lightheadedness, abdominal pain, fever, or sweating.    Pt denies vaginal bleeding, decreased fetal movement, or contractions.    Pt states she ate a sugary breakfast and had caffeine this morning.    RN advised pt to increase her water intake and to eat something that doesn't have a lot of sugar in it and rest at home.    Rn advised pt to be seen in L&D if her shakiness does not improve or gets worse or if she notices any of the above accompanying symptoms.    RN spoke with Dr. Agbeh in clinic who agreed with the above advisement.    Patient verbalized understanding and agreed to plan.   Patient was given the opportunity to ask additional questions and have them answered. Patient had no further questions.     Aline Otto RN on 5/14/2020 at 12:54 PM    "

## 2020-05-15 ENCOUNTER — ANCILLARY PROCEDURE (OUTPATIENT)
Dept: ULTRASOUND IMAGING | Facility: CLINIC | Age: 35
End: 2020-05-15
Attending: OBSTETRICS & GYNECOLOGY
Payer: COMMERCIAL

## 2020-05-15 DIAGNOSIS — O99.210 OBESITY IN PREGNANCY, ANTEPARTUM: ICD-10-CM

## 2020-05-15 PROCEDURE — 76816 OB US FOLLOW-UP PER FETUS: CPT | Performed by: STUDENT IN AN ORGANIZED HEALTH CARE EDUCATION/TRAINING PROGRAM

## 2020-05-18 ENCOUNTER — PRENATAL OFFICE VISIT (OUTPATIENT)
Dept: OBGYN | Facility: CLINIC | Age: 35
End: 2020-05-18
Payer: COMMERCIAL

## 2020-05-18 VITALS
WEIGHT: 277.1 LBS | DIASTOLIC BLOOD PRESSURE: 79 MMHG | HEART RATE: 74 BPM | BODY MASS INDEX: 42.13 KG/M2 | SYSTOLIC BLOOD PRESSURE: 114 MMHG

## 2020-05-18 DIAGNOSIS — Z34.80 SUPERVISION OF OTHER NORMAL PREGNANCY, ANTEPARTUM: Primary | ICD-10-CM

## 2020-05-18 DIAGNOSIS — O99.713 PRURITUS OF PREGNANCY IN THIRD TRIMESTER: ICD-10-CM

## 2020-05-18 DIAGNOSIS — L29.9 PRURITUS OF PREGNANCY IN THIRD TRIMESTER: ICD-10-CM

## 2020-05-18 DIAGNOSIS — O99.210 OBESITY IN PREGNANCY, ANTEPARTUM: ICD-10-CM

## 2020-05-18 LAB
ALBUMIN SERPL-MCNC: 2.5 G/DL (ref 3.4–5)
ALP SERPL-CCNC: 103 U/L (ref 40–150)
ALT SERPL W P-5'-P-CCNC: 21 U/L (ref 0–50)
ANION GAP SERPL CALCULATED.3IONS-SCNC: 5 MMOL/L (ref 3–14)
AST SERPL W P-5'-P-CCNC: 14 U/L (ref 0–45)
BILIRUB SERPL-MCNC: 0.2 MG/DL (ref 0.2–1.3)
BUN SERPL-MCNC: 9 MG/DL (ref 7–30)
CALCIUM SERPL-MCNC: 8.4 MG/DL (ref 8.5–10.1)
CHLORIDE SERPL-SCNC: 110 MMOL/L (ref 94–109)
CO2 SERPL-SCNC: 23 MMOL/L (ref 20–32)
CREAT SERPL-MCNC: 0.45 MG/DL (ref 0.52–1.04)
GFR SERPL CREATININE-BSD FRML MDRD: >90 ML/MIN/{1.73_M2}
GLUCOSE SERPL-MCNC: 85 MG/DL (ref 70–99)
POTASSIUM SERPL-SCNC: 4.2 MMOL/L (ref 3.4–5.3)
PROT SERPL-MCNC: 6.6 G/DL (ref 6.8–8.8)
SODIUM SERPL-SCNC: 138 MMOL/L (ref 133–144)

## 2020-05-18 PROCEDURE — 83789 MASS SPECTROMETRY QUAL/QUAN: CPT | Mod: 90 | Performed by: OBSTETRICS & GYNECOLOGY

## 2020-05-18 PROCEDURE — 99000 SPECIMEN HANDLING OFFICE-LAB: CPT | Performed by: OBSTETRICS & GYNECOLOGY

## 2020-05-18 PROCEDURE — 80053 COMPREHEN METABOLIC PANEL: CPT | Performed by: OBSTETRICS & GYNECOLOGY

## 2020-05-18 PROCEDURE — 99207 ZZC COMPLICATED OB VISIT: CPT | Performed by: OBSTETRICS & GYNECOLOGY

## 2020-05-18 PROCEDURE — 36415 COLL VENOUS BLD VENIPUNCTURE: CPT | Performed by: OBSTETRICS & GYNECOLOGY

## 2020-05-18 NOTE — NURSING NOTE
"Chief Complaint   Patient presents with     Prenatal Care     32w0d       Initial /79 (BP Location: Right arm, Cuff Size: Adult Large)   Pulse 74   Wt 125.7 kg (277 lb 1.6 oz)   LMP 10/07/2019 (Exact Date)   BMI 42.13 kg/m   Estimated body mass index is 42.13 kg/m  as calculated from the following:    Height as of 19: 1.727 m (5' 8\").    Weight as of this encounter: 125.7 kg (277 lb 1.6 oz).  BP completed using cuff size: large        PHQ-9 score:    PHQ-9 SCORE 2020   PHQ-9 Total Score 2       Roseline Luke MA on 2020 at 8:08 AM      "

## 2020-05-18 NOTE — PROGRESS NOTES
Presents for routine  appointment.     No complaints.  Reviewed all of her mychart concerns.    Soles of feet have been itching for the past two weeks.  Lotions do not seem to help.  Has not really got worse since it starting.  No itching of palms.  No rash.    No abnormal discharge, no leaking fluid, no contractions, no vaginal bleeding   ROS:   and GI  negative.     Please see Prenatal Vitals and Notes Flowsheet for objective data.    A/P:  34 year old  at 32w0d       ICD-10-CM    1. Supervision of other normal pregnancy, antepartum  Z34.80    2. Obesity in pregnancy, antepartum  O99.210 US OB >14 Weeks Follow Up   3. Pruritus of pregnancy in third trimester  O99.713 Comprehensive metabolic panel    L29.9 Bile acids fractionated and total       Tdap given at previous visit   Continue baby aspirin for preeclampsia prevention  Plan serial growth US due to obesity.  EFW 40% at 32 weeks (done Friday, reviewed)  Follow up in 2 weeks.      Roseline Ocasio MD

## 2020-05-20 LAB
BILE AC SERPL-SCNC: 1.3 UMOL/L (ref 0–2.5)
CDCAE SERPL-SCNC: 0.9 UMOL/L (ref 0–3.4)
CHOLATE SERPL-SCNC: 3.2 UMOL/L (ref 0–7)
DO-CHOLATE SERPL-SCNC: 0.7 UMOL/L (ref 0–1.9)
URSODEOXYCHOLATE SERPL-SCNC: 0.3 UMOL/L (ref 0–1)

## 2020-05-22 ENCOUNTER — MYC MEDICAL ADVICE (OUTPATIENT)
Dept: PEDIATRICS | Facility: CLINIC | Age: 35
End: 2020-05-22

## 2020-06-04 ENCOUNTER — VIRTUAL VISIT (OUTPATIENT)
Dept: OBGYN | Facility: CLINIC | Age: 35
End: 2020-06-04
Payer: COMMERCIAL

## 2020-06-04 DIAGNOSIS — Z34.80 SUPERVISION OF OTHER NORMAL PREGNANCY, ANTEPARTUM: Primary | ICD-10-CM

## 2020-06-04 PROCEDURE — 99207 ZZC COMPLICATED OB VISIT: CPT | Mod: TEL | Performed by: OBSTETRICS & GYNECOLOGY

## 2020-06-04 NOTE — PROGRESS NOTES
"Kelsea Murray is a 34 year old female who is being evaluated via a billable telephone visit.      The patient has been notified of following:     \"This telephone visit will be conducted via a call between you and your physician/provider. We have found that certain health care needs can be provided without the need for a physical exam.  This service lets us provide the care you need with a short phone conversation.  If a prescription is necessary we can send it directly to your pharmacy.  If lab work is needed we can place an order for that and you can then stop by our lab to have the test done at a later time.    Telephone visits are billed at different rates depending on your insurance coverage. During this emergency period, for some insurers they may be billed the same as an in-person visit.  Please reach out to your insurance provider with any questions.    If during the course of the call the physician/provider feels a telephone visit is not appropriate, you will not be charged for this service.\"    Patient has given verbal consent for Telephone visit?  Yes    What phone number would you like to be contacted at? [phone]    How would you like to obtain your AVS? Paulino       Presents for routine  appointment.     No complaints aside from continued itching, which has been stable.  Bile acids normal at 32 weeks.   No abnormal discharge , no leaking fluid , no contractions , no vaginal bleeding    ROS:   and GI  negative.     Please see Prenatal Vitals and Notes Flowsheet for objective data.    A/P:  34 year old  at 34w3d       ICD-10-CM    1. Supervision of other normal pregnancy, antepartum  Z34.80        Reportable signs and symptoms discussed  Group B Strep at 36+ weeks   Continue baby aspirin for preeclampsia prevention  Plan serial growth US due to obesity.  EFW 40% at 32 weeks (done Friday, reviewed).  Scheduled for   Follow up in 2 weeks. Scheduled for 6/15      Roseline Ocasio, " MD         Phone call duration: 15 minutes

## 2020-06-10 ENCOUNTER — MYC MEDICAL ADVICE (OUTPATIENT)
Dept: OBGYN | Facility: CLINIC | Age: 35
End: 2020-06-10

## 2020-06-11 NOTE — TELEPHONE ENCOUNTER
Per 3/16/2020 note: pt should work from home until further notice.    Pt currently 35w3d, pt next appt 6/15/2020.    Pt asking if she should have letter stating to self quarantine at 37w or if the letter she currently has is OK.    RN routing to provider to advise, RN can write letter if advised.    Aline Otto RN on 6/11/2020 at 8:59 AM

## 2020-06-12 ENCOUNTER — ANCILLARY PROCEDURE (OUTPATIENT)
Dept: ULTRASOUND IMAGING | Facility: CLINIC | Age: 35
End: 2020-06-12
Attending: OBSTETRICS & GYNECOLOGY
Payer: COMMERCIAL

## 2020-06-12 DIAGNOSIS — O99.210 OBESITY IN PREGNANCY, ANTEPARTUM: ICD-10-CM

## 2020-06-12 PROCEDURE — 76816 OB US FOLLOW-UP PER FETUS: CPT | Performed by: STUDENT IN AN ORGANIZED HEALTH CARE EDUCATION/TRAINING PROGRAM

## 2020-06-15 ENCOUNTER — PRENATAL OFFICE VISIT (OUTPATIENT)
Dept: OBGYN | Facility: CLINIC | Age: 35
End: 2020-06-15
Payer: COMMERCIAL

## 2020-06-15 ENCOUNTER — MYC MEDICAL ADVICE (OUTPATIENT)
Dept: OBGYN | Facility: CLINIC | Age: 35
End: 2020-06-15

## 2020-06-15 VITALS
WEIGHT: 289.8 LBS | HEART RATE: 73 BPM | DIASTOLIC BLOOD PRESSURE: 76 MMHG | SYSTOLIC BLOOD PRESSURE: 116 MMHG | BODY MASS INDEX: 44.06 KG/M2

## 2020-06-15 DIAGNOSIS — Z34.80 SUPERVISION OF OTHER NORMAL PREGNANCY, ANTEPARTUM: Primary | ICD-10-CM

## 2020-06-15 PROCEDURE — 99207 ZZC COMPLICATED OB VISIT: CPT | Performed by: OBSTETRICS & GYNECOLOGY

## 2020-06-15 PROCEDURE — 87653 STREP B DNA AMP PROBE: CPT | Performed by: OBSTETRICS & GYNECOLOGY

## 2020-06-16 LAB
GP B STREP DNA SPEC QL NAA+PROBE: NEGATIVE
SPECIMEN SOURCE: NORMAL

## 2020-06-16 NOTE — TELEPHONE ENCOUNTER
Pt currently 36w1d, next appt 6/22/2020.    Pt wanting spectra S1 breast pump order.    Aline Otto RN on 6/16/2020 at 8:21 AM

## 2020-06-22 ENCOUNTER — PRENATAL OFFICE VISIT (OUTPATIENT)
Dept: OBGYN | Facility: CLINIC | Age: 35
End: 2020-06-22
Payer: COMMERCIAL

## 2020-06-22 VITALS
DIASTOLIC BLOOD PRESSURE: 81 MMHG | WEIGHT: 292.4 LBS | HEART RATE: 70 BPM | SYSTOLIC BLOOD PRESSURE: 124 MMHG | BODY MASS INDEX: 44.46 KG/M2

## 2020-06-22 DIAGNOSIS — Z34.80 SUPERVISION OF OTHER NORMAL PREGNANCY, ANTEPARTUM: Primary | ICD-10-CM

## 2020-06-22 PROCEDURE — 99207 ZZC COMPLICATED OB VISIT: CPT | Performed by: OBSTETRICS & GYNECOLOGY

## 2020-06-22 NOTE — NURSING NOTE
"Chief Complaint   Patient presents with     Prenatal Care     37w0d       Initial /81 (BP Location: Right arm, Cuff Size: Adult Large)   Pulse 70   Wt 132.6 kg (292 lb 6.4 oz)   LMP 10/07/2019 (Exact Date)   BMI 44.46 kg/m   Estimated body mass index is 44.46 kg/m  as calculated from the following:    Height as of 19: 1.727 m (5' 8\").    Weight as of this encounter: 132.6 kg (292 lb 6.4 oz).  BP completed using cuff size: regular        PHQ-9 score:    PHQ-9 SCORE 2020   PHQ-9 Total Score 2       Roseline Luke MA on 2020 at 9:45 AM      "

## 2020-06-22 NOTE — PROGRESS NOTES
Presents for routine  appointment.     No complaints aside from some more pressure. .    No abnormal discharge , no leaking fluid , no contractions , no vaginal bleeding    ROS:   and GI  negative.     Please see Prenatal Vitals and Notes Flowsheet for objective data.    Lab Results   Component Value Date    GBS Negative 06/15/2020       A/P:  34 year old  at 37w0d       ICD-10-CM    1. Supervision of other normal pregnancy, antepartum  Z34.80        Labor precautions given   Continue baby aspirin for preeclampsia prevention  Obesity in pregnancy  EFW 2981 gm at 35w 4d, 86%.  AC 89% on .   Follow up in 1 week.      Roseline Ocasio MD

## 2020-07-06 ENCOUNTER — PRENATAL OFFICE VISIT (OUTPATIENT)
Dept: OBGYN | Facility: CLINIC | Age: 35
End: 2020-07-06
Payer: COMMERCIAL

## 2020-07-06 ENCOUNTER — MYC MEDICAL ADVICE (OUTPATIENT)
Dept: OBGYN | Facility: CLINIC | Age: 35
End: 2020-07-06

## 2020-07-06 ENCOUNTER — MYC MEDICAL ADVICE (OUTPATIENT)
Dept: OBGYN | Facility: OTHER | Age: 35
End: 2020-07-06

## 2020-07-06 VITALS
DIASTOLIC BLOOD PRESSURE: 94 MMHG | BODY MASS INDEX: 45.05 KG/M2 | WEIGHT: 293 LBS | HEART RATE: 72 BPM | SYSTOLIC BLOOD PRESSURE: 129 MMHG

## 2020-07-06 DIAGNOSIS — Z34.80 SUPERVISION OF OTHER NORMAL PREGNANCY, ANTEPARTUM: Primary | ICD-10-CM

## 2020-07-06 DIAGNOSIS — O16.3 ELEVATED BLOOD PRESSURE AFFECTING PREGNANCY IN THIRD TRIMESTER, ANTEPARTUM: ICD-10-CM

## 2020-07-06 LAB
ALT SERPL W P-5'-P-CCNC: 19 U/L (ref 0–50)
AST SERPL W P-5'-P-CCNC: 13 U/L (ref 0–45)
CREAT SERPL-MCNC: 0.55 MG/DL (ref 0.52–1.04)
CREAT UR-MCNC: 45 MG/DL
ERYTHROCYTE [DISTWIDTH] IN BLOOD BY AUTOMATED COUNT: 12.2 % (ref 10–15)
GFR SERPL CREATININE-BSD FRML MDRD: >90 ML/MIN/{1.73_M2}
HCT VFR BLD AUTO: 39.3 % (ref 35–47)
HGB BLD-MCNC: 13.1 G/DL (ref 11.7–15.7)
MCH RBC QN AUTO: 28.6 PG (ref 26.5–33)
MCHC RBC AUTO-ENTMCNC: 33.3 G/DL (ref 31.5–36.5)
MCV RBC AUTO: 86 FL (ref 78–100)
PLATELET # BLD AUTO: 271 10E9/L (ref 150–450)
PROT UR-MCNC: 0.06 G/L
PROT/CREAT 24H UR: 0.13 G/G CR (ref 0–0.2)
RBC # BLD AUTO: 4.58 10E12/L (ref 3.8–5.2)
WBC # BLD AUTO: 7.8 10E9/L (ref 4–11)

## 2020-07-06 PROCEDURE — 85027 COMPLETE CBC AUTOMATED: CPT | Performed by: OBSTETRICS & GYNECOLOGY

## 2020-07-06 PROCEDURE — 84156 ASSAY OF PROTEIN URINE: CPT | Performed by: OBSTETRICS & GYNECOLOGY

## 2020-07-06 PROCEDURE — 36415 COLL VENOUS BLD VENIPUNCTURE: CPT | Performed by: OBSTETRICS & GYNECOLOGY

## 2020-07-06 PROCEDURE — 82565 ASSAY OF CREATININE: CPT | Performed by: OBSTETRICS & GYNECOLOGY

## 2020-07-06 PROCEDURE — 84460 ALANINE AMINO (ALT) (SGPT): CPT | Performed by: OBSTETRICS & GYNECOLOGY

## 2020-07-06 PROCEDURE — 99207 ZZC COMPLICATED OB VISIT: CPT | Performed by: OBSTETRICS & GYNECOLOGY

## 2020-07-06 PROCEDURE — 84450 TRANSFERASE (AST) (SGOT): CPT | Performed by: OBSTETRICS & GYNECOLOGY

## 2020-07-06 NOTE — PROGRESS NOTES
Presents for routine  appointment.     No complaints.  She had some queasiness over the weekend but feeling better today.  No nausea.  No headaches. No vision changes.  No upper abdominal pain.   No abnormal discharge , no leaking fluid , no contractions , no vaginal bleeding    She has been monitoring her BP at home and it has been normal.   ROS:   and GI  negative.     Please see Prenatal Vitals and Notes Flowsheet for objective data.    Lab Results   Component Value Date    GBS Negative 06/15/2020       A/P:  34 year old  at 39w0d       ICD-10-CM    1. Supervision of other normal pregnancy, antepartum  Z34.80 Creatinine urine calculation only   2. Elevated blood pressure affecting pregnancy in third trimester, antepartum  O16.3 CBC with platelets     AST     ALT     Creatinine     Protein  random urine with Creat Ratio       Labor precautions given   baby aspirin for preeclampsia prevention  Obesity in pregnancy  EFW 2981 gm at 35w 4d, 86%.  AC 89% on .   Discussed mildly elevated BP today.  She has no symptoms at this time and she has been checking her BPs at home, which have been normal.  She will check her BP in 4 hours and contact me if it is elevated.  Delivery would be recommended if she meets criteria for gestational hypertension. Labs today. Close follow up.  Her cervix is unfavorable.          Roseline Ocasio MD

## 2020-07-06 NOTE — NURSING NOTE
"Chief Complaint   Patient presents with     Prenatal Care     39w0d       Initial BP (!) 133/93 (BP Location: Right arm, Cuff Size: Adult Large)   Pulse 72   Wt 134.4 kg (296 lb 4.8 oz)   LMP 10/07/2019 (Exact Date)   BMI 45.05 kg/m   Estimated body mass index is 45.05 kg/m  as calculated from the following:    Height as of 19: 1.727 m (5' 8\").    Weight as of this encounter: 134.4 kg (296 lb 4.8 oz).  BP completed using cuff size: regular        PHQ-9 score:    PHQ-9 SCORE 2020   PHQ-9 Total Score 2       Roseline Luke MA on 2020 at 10:42 AM    "

## 2020-07-07 ENCOUNTER — TELEPHONE (OUTPATIENT)
Dept: OBGYN | Facility: OTHER | Age: 35
End: 2020-07-07

## 2020-07-07 NOTE — TELEPHONE ENCOUNTER
Pt called back.  Will route encounter to Dr. Ocasio to return pt's call.  Pt states anytime will work for a return call and to call [phone].    Cindy Fernandez RN

## 2020-07-07 NOTE — TELEPHONE ENCOUNTER
Left message    She is a  at 39w1d with obesity in pregnancy, elevated BPs yesterday without proteinuria.  She was seen in triage, initial BP elevated but the rest within normal limits.  Pregravid BMI = 36.8.  TWG = 55 lbs.  Now BMI is 45.    Last US at 35w 4d: EFW 2981 gm, 86%.  AC 89%    Cervix was not favorable in clinic yesterday.      When patient calls back, please let her know I would like to discuss a plan with her.

## 2020-07-08 ENCOUNTER — TRANSFERRED RECORDS (OUTPATIENT)
Dept: HEALTH INFORMATION MANAGEMENT | Facility: CLINIC | Age: 35
End: 2020-07-08

## 2020-07-14 ENCOUNTER — TRANSFERRED RECORDS (OUTPATIENT)
Dept: HEALTH INFORMATION MANAGEMENT | Facility: CLINIC | Age: 35
End: 2020-07-14

## 2020-07-14 LAB
ALT SERPL-CCNC: 38 IU/L (ref 12–68)
AST SERPL-CCNC: 28 IU/L (ref 15–37)
CREAT SERPL-MCNC: 0.87 MG/DL (ref 0.55–1.02)
GFR SERPL CREATININE-BSD FRML MDRD: >60 ML/MIN
GLUCOSE SERPL-MCNC: 77 MG/DL (ref 70–110)
POTASSIUM SERPL-SCNC: 3.8 MMOL/L (ref 3.5–5.1)

## 2020-07-20 ENCOUNTER — OFFICE VISIT (OUTPATIENT)
Dept: OBGYN | Facility: CLINIC | Age: 35
End: 2020-07-20
Payer: COMMERCIAL

## 2020-07-20 VITALS
DIASTOLIC BLOOD PRESSURE: 91 MMHG | BODY MASS INDEX: 43.03 KG/M2 | WEIGHT: 283 LBS | SYSTOLIC BLOOD PRESSURE: 165 MMHG | OXYGEN SATURATION: 99 % | HEART RATE: 60 BPM

## 2020-07-20 DIAGNOSIS — I10 HYPERTENSION, UNSPECIFIED TYPE: ICD-10-CM

## 2020-07-20 DIAGNOSIS — R07.9 CHEST PAIN, UNSPECIFIED TYPE: Primary | ICD-10-CM

## 2020-07-20 PROBLEM — Z34.80 SUPERVISION OF OTHER NORMAL PREGNANCY, ANTEPARTUM: Status: RESOLVED | Noted: 2019-11-22 | Resolved: 2020-07-20

## 2020-07-20 PROBLEM — M54.16 LUMBAR RADICULOPATHY: Status: RESOLVED | Noted: 2019-06-21 | Resolved: 2020-07-20

## 2020-07-20 PROBLEM — R20.9 DISTURBANCE OF SKIN SENSATION: Status: RESOLVED | Noted: 2018-01-15 | Resolved: 2020-07-20

## 2020-07-20 PROCEDURE — 99214 OFFICE O/P EST MOD 30 MIN: CPT | Mod: 24 | Performed by: OBSTETRICS & GYNECOLOGY

## 2020-07-20 NOTE — PATIENT INSTRUCTIONS
If you have any questions regarding your visit, Please contact your care team.     [employer]: [phone]  [facility] CLINIC HOURS TELEPHONE NUMBER       Israel Almodovar M.D.      Peyton Fajardo-Medical Assistant    Gladis-  Michelle-     Monday-[place]  8:00a.m-4:45 p.m  Tuesday-[place]  9:00a.m-4:00 p.m  Wednesday-[place] 8:00a.m-4:45 p.m.  Thursday-[place]  8:00a.m-4:45 p.m.  Friday-[place]  8:00a.m-4:45 p.m. [facility]  [address]  [place], [place] [address] [phone] ask [facility]  [phone] Fax  Imaging [phone]    [facility]  [address]  [place], [place] [address] [phone]    [facility]  [address] [place] [address] [phone] [facility]  [phone] Fax  Imaging [phone]     Urgent Care locations:    [facility] Monday-Friday  5 pm - 9 pm  Saturday and Sunday   9 am - 5 pm  Monday-Friday   11 am - 9 pm  Saturday and Sunday   9 am - 5 pm   [phone]   If you need a medication refill, please contact your pharmacy. Please allow 3 business days for your refill to be completed.  As always, Thank you for trusting us with your healthcare needs!

## 2020-09-09 ENCOUNTER — MEDICAL CORRESPONDENCE (OUTPATIENT)
Dept: HEALTH INFORMATION MANAGEMENT | Facility: CLINIC | Age: 35
End: 2020-09-09

## 2020-10-09 ENCOUNTER — MYC MEDICAL ADVICE (OUTPATIENT)
Dept: GASTROENTEROLOGY | Facility: CLINIC | Age: 35
End: 2020-10-09

## 2020-10-12 NOTE — TELEPHONE ENCOUNTER
Message sent to Fior Frye PA-C with request to review and advise on my chart message.    Sierra Sandoval RN, BSN

## 2020-10-12 NOTE — TELEPHONE ENCOUNTER
Yes, Please advise to schedule a follow up.     Fior Frye PA-C  Gastroenterology  [facility]

## 2020-10-26 ENCOUNTER — VIRTUAL VISIT (OUTPATIENT)
Dept: GASTROENTEROLOGY | Facility: CLINIC | Age: 35
End: 2020-10-26
Payer: COMMERCIAL

## 2020-10-26 DIAGNOSIS — R10.13 ABDOMINAL PAIN, EPIGASTRIC: Primary | ICD-10-CM

## 2020-10-26 DIAGNOSIS — Z87.19 HX OF ACUTE PANCREATITIS: ICD-10-CM

## 2020-10-26 DIAGNOSIS — K21.9 GASTROESOPHAGEAL REFLUX DISEASE, UNSPECIFIED WHETHER ESOPHAGITIS PRESENT: ICD-10-CM

## 2020-10-26 PROCEDURE — 99213 OFFICE O/P EST LOW 20 MIN: CPT | Mod: TEL | Performed by: PHYSICIAN ASSISTANT

## 2020-10-26 RX ORDER — FAMOTIDINE 20 MG/1
20 TABLET, FILM COATED ORAL 2 TIMES DAILY
Qty: 60 TABLET | Refills: 1 | Status: SHIPPED | OUTPATIENT
Start: 2020-10-26 | End: 2021-02-08

## 2020-10-26 NOTE — PROGRESS NOTES
"Kelsea Murray is a 35 year old female who is being evaluated via a billable telephone visit.      The patient has been notified of following:     \"This telephone visit will be conducted via a call between you and your physician/provider. We have found that certain health care needs can be provided without the need for a physical exam.  This service lets us provide the care you need with a short phone conversation.  If a prescription is necessary we can send it directly to your pharmacy.  If lab work is needed we can place an order for that and you can then stop by our lab to have the test done at a later time.    Telephone visits are billed at different rates depending on your insurance coverage. During this emergency period, for some insurers they may be billed the same as an in-person visit.  Please reach out to your insurance provider with any questions.    If during the course of the call the physician/provider feels a telephone visit is not appropriate, you will not be charged for this service.\"    Patient has given verbal consent for Telephone visit?  Yes    What phone number would you like to be contacted at? [phone]    How would you like to obtain your AVS? Paulino Luke LPN    Phone call duration 17 minutes.     GASTROENTEROLOGY FOLLOW UP TELEPHONE VISIT    CC/REFERRING MD:    Be Crespo    REASON FOR CONSULTATION:   Referred by Be Crespo for RECHECK (epigastric abd pain)      HISTORY OF PRESENT ILLNESS:    Kelsea Murray is 35 year old female who presents for follow up.     She was initially seen and evaluated for pancreatitis in May 2019. At that time she had her first bout of pancreatitis for which the cause was unknown. Work up at that time included an MRCP and an EUS as there were concerns for chronic pancreatitis which was normal. She was overall doing well until about a month ago. She developed an epigastic pain that she describes as burning.She did " recently have a baby in July 2020. She did not have any issues during her pregnancy.     She is concerned that her reoccuring epigastric abd pain could be related to pancreatitis although she notes that it does not feel exactly as before. She currently notes burning and also notes acid reflux symptoms. Symptoms typically start about 10 minutes after eating. There is mild nausea. No vomiting. She is not currently taking any NSAIDs.       PMHx, PSHx, Social Hx, Family Hx have been reviewed.         PERTINENT STUDIES: (I personally reviewed these laboratory studies today)  Most recent CBC:   Recent Labs   Lab Test 07/06/20  1113 04/17/20  1552 11/22/19  0950   WBC 7.8  --  8.8   HGB 13.1 12.9 12.9   HCT 39.3  --  38.5     --  284     Most recent hepatic panel:  Recent Labs   Lab Test 07/14/20 07/06/20  1113   ALT 38 19   AST 28 13     Most recent creatinine:  Recent Labs   Lab Test 07/14/20 07/06/20  1113   CR 0.87 0.55       TSH   Date Value Ref Range Status   10/10/2019 1.89 0.40 - 4.00 mU/L Final           ASSESSMENT/PLAN:    Kelsea Murray is a 35 year old female who presents for evaluation of epigastric abd pain.     Epigastric abd pain - pancreatitis vs gastritis/pud. Her symptoms seem likely related to the latter. Therefore will start her on acid reducing medications. Will also check labs to include hepatic panel and pancreatic enzymes. If any concerns on her labs or if symptoms persist despite trial of h2 blockers then consider imaging CT abd.     Of note she currently breastfeeds. Advised her to review with her child's pediatrician or her ob-gyn if there are any concerns with the prescribed medication. Patient verbalizes understanding.     Follow up in 4 weeks, sooner if needed.         Abdominal pain, epigastric  Gastroesophageal reflux disease, unspecified whether esophagitis present  - famotidine (PEPCID) 20 MG tablet  Dispense: 60 tablet; Refill: 1  Hx of acute pancreatitis    Thank you for this  consultation.  It was a pleasure to participate in the care of this patient; please contact us with any further questions.      Fior Frye PA-C  Gastroenterology  [facility]

## 2020-10-26 NOTE — PATIENT INSTRUCTIONS
Start taking pepcid (famotidine) 20 mg twice daily.     Please complete the labs ate your earliest convenience.

## 2020-12-27 ENCOUNTER — HEALTH MAINTENANCE LETTER (OUTPATIENT)
Age: 35
End: 2020-12-27

## 2021-01-01 ENCOUNTER — OFFICE VISIT (OUTPATIENT)
Dept: URGENT CARE | Facility: URGENT CARE | Age: 36
End: 2021-01-01
Payer: COMMERCIAL

## 2021-01-01 VITALS
OXYGEN SATURATION: 99 % | DIASTOLIC BLOOD PRESSURE: 85 MMHG | HEART RATE: 86 BPM | BODY MASS INDEX: 38.74 KG/M2 | RESPIRATION RATE: 16 BRPM | TEMPERATURE: 99.5 F | SYSTOLIC BLOOD PRESSURE: 128 MMHG | WEIGHT: 254.8 LBS

## 2021-01-01 DIAGNOSIS — Z87.19 HISTORY OF PANCREATITIS: ICD-10-CM

## 2021-01-01 DIAGNOSIS — R10.32 LLQ ABDOMINAL PAIN: Primary | ICD-10-CM

## 2021-01-01 PROCEDURE — 99214 OFFICE O/P EST MOD 30 MIN: CPT | Performed by: PHYSICIAN ASSISTANT

## 2021-01-01 ASSESSMENT — PAIN SCALES - GENERAL: PAINLEVEL: MODERATE PAIN (4)

## 2021-01-01 NOTE — PROGRESS NOTES
Assessment & Plan       ICD-10-CM    1. LLQ abdominal pain  R10.32    2. History of pancreatitis  Z87.19    3. Breast feeding status of mother  Z39.1        CONSULTATION/REFERRAL to ER.  With the severity of the pain, rebound on exam, low-grade fever,  I recommend she go to the ER for further evaluation.  We do not have any advanced imaging.  Differential would include diverticulitis, kidney stone, ovarian cyst, ovarian torsion, colitis, viral syndrome, ectopic pregnancy, mesenteric adenitis.  I offered to do some basic labs such as CBC and urine but told patient I would still not change my recommendation to go to the ER with the severity of pain.  She declines labs at this time and will go to [facility].    DAO [facility] URGENT [facility]ARE MARZENA C[place]    Ashok Murray is a 35 year old female who presents to clinic today for the following health issues:    Left lower quadrant pain    HPI   Present for 1.5 days.  Has a prior history of pancreatitis 2 years ago but this feels different.  Did not eat much yesterday because of the pain.  No constipation or diarrhea.  No nausea, vomiting, headache, dizziness.  Low-grade temp here today LMP 7/2020.  Gave birth at that time.  Is currently breast-feeding with bottle supplementation.  Pain is 4 out of 10 but has been up to 7 or 8 out of 10.  No dysuria, frequency or urgency.      Review of Systems   Constitutional, HEENT, cardiovascular, pulmonary,  gu systems are negative, except as otherwise noted.      Objective    /85 (BP Location: Left arm, Patient Position: Sitting, Cuff Size: Adult Large)   Pulse 86   Temp 99.5  F (37.5  C) (Oral)   Resp 16   Wt 115.6 kg (254 lb 12.8 oz)   SpO2 99%   BMI 38.74 kg/m    Body mass index is 38.74 kg/m .  Physical Exam   GENERAL: healthy, alert and no distress  HENT: ear canals and TM's normal, nose and mouth without ulcers or lesions  NECK: no adenopathy, no asymmetry, masses, or  scars and thyroid normal to palpation  RESP: lungs clear to auscultation - no rales, rhonchi or wheezes  CV: regular rate and rhythm, normal S1 S2, no S3 or S4, no murmur, click or rub, no peripheral edema and peripheral pulses strong  ABDOMEN: soft, moderate left lower quadrant tenderness with rebound.  No rigidity.  Mild guarding.  When I palpate other areas of the abdomen it reproduces her pain in the left lower quadrant.  MS: no gross musculoskeletal defects noted, no edema

## 2021-01-04 NOTE — PROGRESS NOTES
OB-GYN Problem-Oriented Visit or Consultation      Kelsea Murray is a 35 year old year old P 1 who presents with a chief complaint of persistent chest tightness.  Referred by self.  Patient's last menstrual period was 10/07/2019 (exact date).    HPI:     Had gest HTN and induction with  on . Had  [facility] ED evaluation for chest tightness and some shortness of breath and edema. No evidence of cardiac ischemia, PE, or preeclampsia then. CT neg except some cardiomegaly noted. Discharged to follow-up; no anti-hypertensives used. Edema gradually improved but today has mild headache and tried Tylenol. Still notes chest tightness, especially when lying done. No dyspnea. Home -140/85-91. Pumping breast milk for infant son- doing well.     Past medical, obstetrical, surgical, family and social history reviewed and as noted or updated in chart.     Allergies, meds and supplements are as noted or updated in chart.      ROS:   Systems reviewed include:  constitutional, breast, cardiac, respiratory, gastrointestinal, genitourinary, psychological, hematologic/lymphatic and endocrine.    These systems were negative for significant symptoms except for the following additional: none; see HPI.    EXAM:  VS as noted. BP (!) 165/91 (BP Location: Right arm, Patient Position: Chair, Cuff Size: Adult Large)   Pulse 60   Wt 128.4 kg (283 lb)   LMP 10/07/2019 (Exact Date)   SpO2 99%   Breastfeeding Yes   BMI 43.03 kg/m    Constitutionally normal. /98 initially and 165/91 on repeat.   Alert and oriented in NAD.      Assessment:   Encounter Diagnoses   Name Primary?     Chest pain, unspecified type Yes     Hypertension, unspecified type          Plan and Recommendations:   Total encounter time (physician together with patient) = 25min. Direct counseling, education and care coordination time (physician together with patient) = 15min. This included the following:   I reviewed the condition, causes, differential  diagnosis, prognosis, evaluation and management considerations and options.  Questions answered and information given. See orders.     Suspect on-going chronic HTN now. Doubt late onset preeclampsia but recheck testing. Need to exclude peripartum cardiomyopathy.     Advise Cardiology evaluation with probable Echocardiogram. Not able to arrange this today here as outpt and suggest return [place] [facility] [place]. Will need to control BP.     A/P:  Kelsea was seen today for postpartum care.    Diagnoses and all orders for this visit:    Chest pain, unspecified type    Hypertension, unspecified type        Israel Almodovar MD         no

## 2021-02-03 DIAGNOSIS — R10.13 ABDOMINAL PAIN, EPIGASTRIC: ICD-10-CM

## 2021-02-03 LAB
ALBUMIN SERPL-MCNC: 3.8 G/DL (ref 3.4–5)
ALP SERPL-CCNC: 91 U/L (ref 40–150)
ALT SERPL W P-5'-P-CCNC: 37 U/L (ref 0–50)
AST SERPL W P-5'-P-CCNC: 18 U/L (ref 0–45)
BILIRUB DIRECT SERPL-MCNC: <0.1 MG/DL (ref 0–0.2)
BILIRUB SERPL-MCNC: 0.4 MG/DL (ref 0.2–1.3)
PROT SERPL-MCNC: 7.6 G/DL (ref 6.8–8.8)

## 2021-02-03 PROCEDURE — 80076 HEPATIC FUNCTION PANEL: CPT | Performed by: PHYSICIAN ASSISTANT

## 2021-02-03 PROCEDURE — 36415 COLL VENOUS BLD VENIPUNCTURE: CPT | Performed by: PHYSICIAN ASSISTANT

## 2021-02-05 DIAGNOSIS — D18.00 HEMANGIOMA: ICD-10-CM

## 2021-02-05 DIAGNOSIS — M54.50 LUMBAR PAIN: ICD-10-CM

## 2021-02-05 DIAGNOSIS — M54.16 LUMBAR RADICULOPATHY: Primary | ICD-10-CM

## 2021-02-08 ENCOUNTER — VIRTUAL VISIT (OUTPATIENT)
Dept: GASTROENTEROLOGY | Facility: CLINIC | Age: 36
End: 2021-02-08
Payer: COMMERCIAL

## 2021-02-08 DIAGNOSIS — K21.9 GASTROESOPHAGEAL REFLUX DISEASE, UNSPECIFIED WHETHER ESOPHAGITIS PRESENT: ICD-10-CM

## 2021-02-08 DIAGNOSIS — Z87.19 HX OF DIVERTICULITIS OF COLON: Primary | ICD-10-CM

## 2021-02-08 PROCEDURE — 99214 OFFICE O/P EST MOD 30 MIN: CPT | Mod: 95 | Performed by: PHYSICIAN ASSISTANT

## 2021-02-08 RX ORDER — FAMOTIDINE 20 MG/1
20 TABLET, FILM COATED ORAL
Qty: 60 TABLET | Refills: 1
Start: 2021-02-08 | End: 2021-05-07

## 2021-02-08 RX ORDER — PANTOPRAZOLE SODIUM 40 MG/1
40 TABLET, DELAYED RELEASE ORAL EVERY MORNING
Qty: 90 TABLET | Refills: 0 | Status: SHIPPED | OUTPATIENT
Start: 2021-02-08 | End: 2021-05-07

## 2021-02-08 NOTE — PROGRESS NOTES
Kelsea is a 35 year old who is being evaluated via a billable telephone visit.      What phone number would you like to be contacted at? [phone]  How would you like to obtain your AVS? Paulino Rodriguez LPN on 2/8/21 at 2:02 PM      Subjective     Kelsea is a 35 year old who presents via telephone visit for follow up.     She does have a hx of one bout of idiopathic pancreatitis which was previously evaluated with MRCP and EUS through [facility]. She was last evaluated in October 2020 (about 3.5 months ago) for epigastric abdominal pain. There was concern that her pain could be related to her hx of pancreatitis however her symptoms were more concerning for GERD. She was therefore started on pepcid 20mg twice daily. She notes that it has helped with reflux but has not completely alleviated her reflux symptoms. She does have heartburn or reflux if she misses a day of medication. She does continues to have the epigastric abd pains shortly after eating. She rarely takes ibuprofen. Maybe once every 2 months or so.     Of note she is continuing follow up of pancreatitis with [facility]. She recently had an MRCP in November 2020 which noted a pancreatic cyst. She is scheduled per [facility] for follow up MRI in May 2021.     She also went to the ED since our last visit for left sided abdominal pain. She was diagnosed with diverticulitis which was her first bout. She is no longer having this left sided abdominal pain.         Objective       Vitals:  No vitals were obtained today due to virtual visit.    Physical Exam   alert and cooperative  PSYCH: Alert and oriented times 3; coherent speech, normal   rate and volume, able to articulate logical thoughts, able   to abstract reason, no tangential thoughts, no hallucinations   or delusions  Her affect is normal  RESP: No cough, no audible wheezing, able to talk in full sentences  Remainder of exam unable to be completed due to telephone visits      Assessment & Plan     Hx of  diverticulitis of colon  - GASTROENTEROLOGY ADULT REF PROCEDURE ONLY  Gastroesophageal reflux disease, unspecified whether esophagitis present  - pantoprazole (PROTONIX) 40 MG EC tablet  Dispense: 90 tablet; Refill: 0  - famotidine (PEPCID) 20 MG tablet  Dispense: 60 tablet; Refill: 1    Kelsea Murray is a pleasant 35 year old female who presents via telephone visit for follow up.     She was recently started on treatment for reflux with pepcid. While this has improved her reflux it has not improved her epigastric abd pain. She does have hx of pancreatitis as well however she recently did have an eval with an MRCP in November 2020 through [facility] which did not show any concerns for pancreatitis. 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 Again her epigastric pain is likely related to GERD. Will switch from pepcid to protonix at this time. Advised that she can continue pepcid as needed at bedtime.     She also recently had a bout of diverticulitis 5-6 weeks ago. Diagnosed in the ED via CT scan. Recent ED records reviewed. She has since recovered. Recommended colonoscopy in the coming weeks as this was her first bout of diverticulitis.       30 minutes spent on the date of the encounter doing chart review, history and exam, documentation and further activities as noted above    Return in about 3 months (around 5/8/2021) for Follow up.    Fior Frye PA-C  [facility]    Phone call duration: 17 minutes

## 2021-02-08 NOTE — PATIENT INSTRUCTIONS
Stop pepcid (Famotidine).     Start taking protonix (pantoprazole) once every morning on an empty stomach about 30 minutes before breakfast.     You can still take pepcid (famotidine) on as needed basis.     Please schedule your colonoscopy. If you have not heard from the scheduling office within 2 business days, please call [phone].

## 2021-02-11 ENCOUNTER — HOSPITAL ENCOUNTER (OUTPATIENT)
Facility: AMBULATORY SURGERY CENTER | Age: 36
End: 2021-02-11
Attending: INTERNAL MEDICINE
Payer: COMMERCIAL

## 2021-02-11 DIAGNOSIS — Z12.11 SCREEN FOR COLON CANCER: Primary | ICD-10-CM

## 2021-02-13 DIAGNOSIS — Z11.59 ENCOUNTER FOR SCREENING FOR OTHER VIRAL DISEASES: ICD-10-CM

## 2021-02-19 RX ORDER — SODIUM, POTASSIUM,MAG SULFATES 17.5-3.13G
1 SOLUTION, RECONSTITUTED, ORAL ORAL SEE ADMIN INSTRUCTIONS
Qty: 2 BOTTLE | Refills: 0 | Status: SHIPPED | OUTPATIENT
Start: 2021-02-19 | End: 2021-02-26 | Stop reason: HOSPADM

## 2021-02-19 RX ORDER — BISACODYL 5 MG/1
15 TABLET, DELAYED RELEASE ORAL SEE ADMIN INSTRUCTIONS
Qty: 3 TABLET | Refills: 0 | Status: SHIPPED | OUTPATIENT
Start: 2021-02-19 | End: 2021-02-26 | Stop reason: HOSPADM

## 2021-02-22 ENCOUNTER — MEDICAL CORRESPONDENCE (OUTPATIENT)
Dept: HEALTH INFORMATION MANAGEMENT | Facility: CLINIC | Age: 36
End: 2021-02-22

## 2021-02-28 DIAGNOSIS — Z11.59 ENCOUNTER FOR SCREENING FOR OTHER VIRAL DISEASES: ICD-10-CM

## 2021-03-09 RX ORDER — BISACODYL 5 MG/1
15 TABLET, DELAYED RELEASE ORAL SEE ADMIN INSTRUCTIONS
Qty: 3 TABLET | Refills: 0 | Status: SHIPPED | OUTPATIENT
Start: 2021-03-09 | End: 2021-05-07

## 2021-03-09 RX ORDER — SODIUM, POTASSIUM,MAG SULFATES 17.5-3.13G
1 SOLUTION, RECONSTITUTED, ORAL ORAL SEE ADMIN INSTRUCTIONS
Qty: 2 BOTTLE | Refills: 0 | Status: SHIPPED | OUTPATIENT
Start: 2021-03-09 | End: 2021-05-07

## 2021-03-16 DIAGNOSIS — Z11.59 ENCOUNTER FOR SCREENING FOR OTHER VIRAL DISEASES: ICD-10-CM

## 2021-04-09 DIAGNOSIS — Z11.59 ENCOUNTER FOR SCREENING FOR OTHER VIRAL DISEASES: ICD-10-CM

## 2021-04-09 LAB
SARS-COV-2 RNA RESP QL NAA+PROBE: NORMAL
SPECIMEN SOURCE: NORMAL

## 2021-04-09 PROCEDURE — U0003 INFECTIOUS AGENT DETECTION BY NUCLEIC ACID (DNA OR RNA); SEVERE ACUTE RESPIRATORY SYNDROME CORONAVIRUS 2 (SARS-COV-2) (CORONAVIRUS DISEASE [COVID-19]), AMPLIFIED PROBE TECHNIQUE, MAKING USE OF HIGH THROUGHPUT TECHNOLOGIES AS DESCRIBED BY CMS-2020-01-R: HCPCS | Performed by: INTERNAL MEDICINE

## 2021-04-09 PROCEDURE — U0005 INFEC AGEN DETEC AMPLI PROBE: HCPCS | Performed by: INTERNAL MEDICINE

## 2021-04-09 RX ORDER — BISACODYL 5 MG/1
15 TABLET, DELAYED RELEASE ORAL SEE ADMIN INSTRUCTIONS
Qty: 3 TABLET | Refills: 0 | Status: SHIPPED | OUTPATIENT
Start: 2021-04-09 | End: 2021-05-07

## 2021-04-09 RX ORDER — SODIUM, POTASSIUM,MAG SULFATES 17.5-3.13G
2 SOLUTION, RECONSTITUTED, ORAL ORAL SEE ADMIN INSTRUCTIONS
Qty: 354 ML | Refills: 0 | Status: SHIPPED | OUTPATIENT
Start: 2021-04-09 | End: 2021-05-07

## 2021-04-09 ASSESSMENT — MIFFLIN-ST. JEOR: SCORE: 1882.49

## 2021-04-10 LAB
LABORATORY COMMENT REPORT: NORMAL
SARS-COV-2 RNA RESP QL NAA+PROBE: NEGATIVE
SPECIMEN SOURCE: NORMAL

## 2021-04-12 ENCOUNTER — HOSPITAL ENCOUNTER (OUTPATIENT)
Facility: AMBULATORY SURGERY CENTER | Age: 36
Discharge: HOME | End: 2021-04-12
Attending: INTERNAL MEDICINE | Admitting: INTERNAL MEDICINE
Payer: COMMERCIAL

## 2021-04-12 VITALS
SYSTOLIC BLOOD PRESSURE: 105 MMHG | OXYGEN SATURATION: 100 % | WEIGHT: 250 LBS | RESPIRATION RATE: 16 BRPM | TEMPERATURE: 97.5 F | HEART RATE: 68 BPM | HEIGHT: 68 IN | BODY MASS INDEX: 37.89 KG/M2 | DIASTOLIC BLOOD PRESSURE: 75 MMHG

## 2021-04-12 DIAGNOSIS — Z12.11 SCREEN FOR COLON CANCER: ICD-10-CM

## 2021-04-12 DIAGNOSIS — Z12.11 COLON CANCER SCREENING: Primary | ICD-10-CM

## 2021-04-12 LAB — COLONOSCOPY: NORMAL

## 2021-04-12 PROCEDURE — 88305 TISSUE EXAM BY PATHOLOGIST: CPT | Performed by: PATHOLOGY

## 2021-04-12 PROCEDURE — 45380 COLONOSCOPY AND BIOPSY: CPT

## 2021-04-12 PROCEDURE — G8907 PT DOC NO EVENTS ON DISCHARG: HCPCS

## 2021-04-12 PROCEDURE — G8918 PT W/O PREOP ORDER IV AB PRO: HCPCS

## 2021-04-12 RX ORDER — ONDANSETRON 4 MG/1
4 TABLET, ORALLY DISINTEGRATING ORAL EVERY 6 HOURS PRN
Status: DISCONTINUED | OUTPATIENT
Start: 2021-04-12 | End: 2021-04-13 | Stop reason: HOSPADM

## 2021-04-12 RX ORDER — NALOXONE HYDROCHLORIDE 0.4 MG/ML
0.4 INJECTION, SOLUTION INTRAMUSCULAR; INTRAVENOUS; SUBCUTANEOUS
Status: DISCONTINUED | OUTPATIENT
Start: 2021-04-12 | End: 2021-04-13 | Stop reason: HOSPADM

## 2021-04-12 RX ORDER — ONDANSETRON 2 MG/ML
4 INJECTION INTRAMUSCULAR; INTRAVENOUS
Status: DISCONTINUED | OUTPATIENT
Start: 2021-04-12 | End: 2021-04-13 | Stop reason: HOSPADM

## 2021-04-12 RX ORDER — FENTANYL CITRATE 50 UG/ML
INJECTION, SOLUTION INTRAMUSCULAR; INTRAVENOUS PRN
Status: DISCONTINUED | OUTPATIENT
Start: 2021-04-12 | End: 2021-04-12 | Stop reason: HOSPADM

## 2021-04-12 RX ORDER — LIDOCAINE 40 MG/G
CREAM TOPICAL
Status: DISCONTINUED | OUTPATIENT
Start: 2021-04-12 | End: 2021-04-13 | Stop reason: HOSPADM

## 2021-04-12 RX ORDER — PROCHLORPERAZINE MALEATE 10 MG
10 TABLET ORAL EVERY 6 HOURS PRN
Status: DISCONTINUED | OUTPATIENT
Start: 2021-04-12 | End: 2021-04-13 | Stop reason: HOSPADM

## 2021-04-12 RX ORDER — FLUMAZENIL 0.1 MG/ML
0.2 INJECTION, SOLUTION INTRAVENOUS
Status: ACTIVE | OUTPATIENT
Start: 2021-04-12 | End: 2021-04-12

## 2021-04-12 RX ORDER — NALOXONE HYDROCHLORIDE 0.4 MG/ML
0.2 INJECTION, SOLUTION INTRAMUSCULAR; INTRAVENOUS; SUBCUTANEOUS
Status: DISCONTINUED | OUTPATIENT
Start: 2021-04-12 | End: 2021-04-13 | Stop reason: HOSPADM

## 2021-04-12 RX ORDER — ONDANSETRON 2 MG/ML
4 INJECTION INTRAMUSCULAR; INTRAVENOUS EVERY 6 HOURS PRN
Status: DISCONTINUED | OUTPATIENT
Start: 2021-04-12 | End: 2021-04-13 | Stop reason: HOSPADM

## 2021-04-14 LAB — COPATH REPORT: NORMAL

## 2021-04-25 ENCOUNTER — HEALTH MAINTENANCE LETTER (OUTPATIENT)
Age: 36
End: 2021-04-25

## 2021-05-07 ENCOUNTER — OFFICE VISIT (OUTPATIENT)
Dept: OBGYN | Facility: CLINIC | Age: 36
End: 2021-05-07
Payer: COMMERCIAL

## 2021-05-07 VITALS
SYSTOLIC BLOOD PRESSURE: 121 MMHG | DIASTOLIC BLOOD PRESSURE: 82 MMHG | HEART RATE: 74 BPM | OXYGEN SATURATION: 100 % | WEIGHT: 252.1 LBS | BODY MASS INDEX: 38.33 KG/M2

## 2021-05-07 DIAGNOSIS — N88.8 NABOTHIAN CYST: Primary | ICD-10-CM

## 2021-05-07 PROCEDURE — 99213 OFFICE O/P EST LOW 20 MIN: CPT | Performed by: OBSTETRICS & GYNECOLOGY

## 2021-05-07 NOTE — PROGRESS NOTES
OB/GYN      NAME:  Kelsea Murray  PCP:  No Ref-Primary, Physician  MRN:  [id number]    Impression / Plan     35 year old  with:    Nabothian cyst.       Discussed exam findings, which are consistent with nabothian cyst.  These are very small and should not cause problems.  These are benign findings, common after vaginal delivery.  She is planning pregnancy soon.  She will be advanced maternal age.  She may attempt pregnancy any time.  Recommend folic acid supplements which can be found in women's or prenatal vitamins.  She will follow-up as needed.    Chief Complaint     Chief Complaint   Patient presents with     Consult       HPI     Kelsea Murray is a  35 year old female who is seen for bump on cervix.    Patient's last menstrual period was 2021.  This was her first period since the delivery. Stopped breast feeding about a month and a half ago. Normal cycle.  Had some pms symptoms.     She has been checking to see if she is returning to normal ovulatory function.  She felt some bumps on her cervix.    No abnormal vaginal discharge.    No pelvic pain.      History of spontaneous vaginal delivery 2020.          Problem List     Patient Active Problem List    Diagnosis Date Noted     Obesity, morbid, BMI 40.0-49.9 (H) 01/15/2018     Priority: Medium       Medications     Current Outpatient Medications   Medication     Prenatal Vit-Fe Fumarate-FA (PRENATAL MULTIVITAMIN W/IRON) 27-0.8 MG tablet     No current facility-administered medications for this visit.         Allergies     No Known Allergies    ROS     A  organ review of systems was asked and the pertinent positives and negatives are listed in the HPI.     Physical Exam   Vitals: /82 (BP Location: Right arm, Cuff Size: Adult Large)   Pulse 74   Wt 114.4 kg (252 lb 1.6 oz)   LMP 2021   SpO2 100%   BMI 38.33 kg/m      General: Comfortable, no obvious distress  : Normal female external genitalia.  No lesions.   Urethral meatus normal.  Speculum exam reveals a normal vaginal vault, normal cervix.  No abnormal discharge.  Bimanual exam reveals a normal, mobile, nontender uterus.  No cervical motion tenderness.  Adnexa nontender with no palpable masses.  Very small nabothian cysts felt on cervix            20 min spent on the date of the encounter in chart review, patient visit, review of tests, documentation about the issues documented above.     Roseline Ocasio MD

## 2021-05-17 ENCOUNTER — MYC MEDICAL ADVICE (OUTPATIENT)
Dept: OBGYN | Facility: CLINIC | Age: 36
End: 2021-05-17

## 2021-05-17 ENCOUNTER — VIRTUAL VISIT (OUTPATIENT)
Dept: GASTROENTEROLOGY | Facility: CLINIC | Age: 36
End: 2021-05-17
Payer: COMMERCIAL

## 2021-05-17 DIAGNOSIS — K21.9 GASTROESOPHAGEAL REFLUX DISEASE, UNSPECIFIED WHETHER ESOPHAGITIS PRESENT: Primary | ICD-10-CM

## 2021-05-17 DIAGNOSIS — Z87.19 HX OF DIVERTICULITIS OF COLON: ICD-10-CM

## 2021-05-17 DIAGNOSIS — Z87.19 HX OF PANCREATITIS: ICD-10-CM

## 2021-05-17 DIAGNOSIS — K86.2 PANCREATIC CYST: ICD-10-CM

## 2021-05-17 DIAGNOSIS — R10.13 ABDOMINAL PAIN, EPIGASTRIC: ICD-10-CM

## 2021-05-17 PROCEDURE — 99213 OFFICE O/P EST LOW 20 MIN: CPT | Mod: 95 | Performed by: PHYSICIAN ASSISTANT

## 2021-05-17 RX ORDER — PANTOPRAZOLE SODIUM 40 MG/1
40 TABLET, DELAYED RELEASE ORAL EVERY MORNING
Qty: 30 TABLET | Refills: 2 | Status: SHIPPED | OUTPATIENT
Start: 2021-05-17 | End: 2021-12-02

## 2021-05-17 NOTE — PROGRESS NOTES
GASTROENTEROLOGY FOLLOW UP TELEPHONE VISIT        REASON FOR CONSULTATION:  RECHECK (3 month follow up)    HISTORY OF PRESENT ILLNESS:    Kelsea Murray is 35 year old female who presents for follow up.     She has an epigastric abdominal discomfort that she describes as a pressure feeling that radiates into her back.  She does have history of pancreatitis.  She experienced one bout of idiopathic pancreatitis in 2019.  This was evaluated through [facility] with an MRCP and EUS in 2019.  There were no signs of chronic pancreatitis but she was found to have a pancreatic cyst which is still being followed by [facility]. Her last MRCP eval was performed by [facility] in November 2020. She is scheduled to have her next MRCP with [facility] in about 3 weeks. She is concerned that her continued epigastric discomfort could be related to her pancreas.  She however also has acid reflux symptoms and is not currently on medications.  At her last visit 2 months ago he did recommend adding in Protonix daily to her regimen with Pepcid however she did not actually start the medications. She has been working on healthy life style in changes in hopes that it would help and while she has lost about 15 lbs she continues to have significant reflux.     She also recently had her first bout of diverticulitis which prompted a colonoscopy (6 weeks after diverticulitis).  Her colonoscopy did show diverticulosis in the sigmoid colon and a 1 mm polyp.  The remainder of her colonoscopy was unremarkable.     PMHx, PSHx, Social Hx, Family Hx have been reviewed.     PREVIOUS ENDOSCOPY:  Colonoscopy 4/12/2021  Impression:               - The examined portion of the ileum was normal.                             - Diverticulosis in the sigmoid colon, in the                             descending colon and in the transverse colon.                             - One 1 mm polyp in the rectum, removed with a cold                             biopsy forceps. Resected  and retrieved.     PERTINENT STUDIES: (I personally reviewed these laboratory studies today)  Most recent CBC:   Recent Labs   Lab Test 07/06/20  1113 04/17/20  1552 11/22/19  0950   WBC 7.8  --  8.8   HGB 13.1 12.9 12.9   HCT 39.3  --  38.5     --  284     Most recent hepatic panel:  Recent Labs   Lab Test 02/03/21  1014 07/14/20   ALT 37 38   AST 18 28     Most recent creatinine:  Recent Labs   Lab Test 07/14/20 07/06/20  1113   CR 0.87 0.55       TSH   Date Value Ref Range Status   10/10/2019 1.89 0.40 - 4.00 mU/L Final         Physical Exam   healthy, alert and no distress  PSYCH: Alert and oriented times 3; coherent speech, normal   rate and volume, able to articulate logical thoughts, able   to abstract reason, no tangential thoughts, no hallucinations   or delusions, her affect is normal  RESP: No cough, no audible wheezing, able to talk in full sentences  Remainder of exam unable to be completed due to telephone visits        ASSESSMENT/PLAN:    1. Gastroesophageal reflux disease, unspecified whether esophagitis present  - pantoprazole (PROTONIX) 40 MG EC tablet; Take 1 tablet (40 mg) by mouth every morning On an empty stomach about 30 minutes before breakfast  Dispense: 30 tablet; Refill: 2  2. Abdominal pain, epigastric  3. Hx of diverticulitis of colon  4. Hx of pancreatitis   5. Pancreatic cyst      Kelsea Murray is a 35 year old female with pmhx of idiopathic pancreatitis who presents for follow up. She continues to have an epigastric abdominal pain at this time which is thought to be related to her GERD. She is not currently on any acid reducing medications and does have a significant amount of reflux symptoms. Will start protonix 40mg daily. Can add in pepcid if needed. There is still concern that symptoms could be related to her pancreas given her previous bout of pancreatitis in the past. She does have an MRCP coming up in several weeks with [facility] for surveillance of her pancreatic cyst.  Advised patient to notify me once MRCP is completed so that I can review through Care-everywhere.     If symptoms persist despite treatment of reflux can consider further eval endoscopically with EGD vs EGD-EUS.     She also recently had a bout of diverticulitis and subsequently had a colonoscopy after the recommneded 6-8 week period. Colonoscopy was overall unrevealing. Reviewed diverticulosis and recommended she take fiber supplementation.      Thank you for this consultation.  It was a pleasure to participate in the care of this patient; please contact us with any further questions.    This note was created with voice recognition software, and while reviewed for accuracy, typos may remain.       Fior Frye PA-C  Gastroenterology  [facility]     Phone call duration: 21 minutes

## 2021-05-17 NOTE — PATIENT INSTRUCTIONS
It was a pleasure visiting with you today.     Start taking protonix 40mg for reflux (GERD). This is to be taken every morning on an empty stomach about 30 minutes before breakfast.     Please notify me after you have completed your MRI with [facility] so that I can review. If you continue to have symptoms despite reflux medication and/or if there are concerns on your MRI then we may need to consider an endoscopic evaluation.     Please also see handout information regarding diverticulosis and diverticular disease below or at the end of this after visit summary.     Fior Frye PA-C  Gastroenterology  [facility]        Patient Education     Diverticulosis    Diverticulosis means that small pouches have formed in the wall of your large intestine (colon). Most often, this problem causes no symptoms and is common as people age. But the pouches in the colon are at risk of becoming infected. When this happens, the condition is called diverticulitis. Although most people with diverticulosis never develop diverticulitis, it is still not uncommon. Rectal bleeding can also occur and in less common situations, a type of colon inflammation called colitis.  While most people don't have symptoms, some people with diverticulosis may have:    Abdominal cramps and pain    Bloating    Constipation    Change in bowel habits  Causes  The exact cause of diverticulosis (and diverticulitis) has not been proved, but a few things are associated with the condition:    Low-fiber diet    Constipation    Lack of exercise  Your healthcare provider will talk with you about how to manage your condition. Diet changes may be all that are needed to help control diverticulosis and prevent progression to diverticulitis. If you develop diverticulitis, you will likely need other treatments.  Home care  You may be told to take fiber supplements daily. Fiber adds bulk to the stool so that it passes through the colon more easily. Stool  softeners may also be recommended. You may also be given medicines for pain relief. Be sure to take all medicines as directed.  In the past, people were told to avoid corn, nuts, and seeds. This is no longer necessary.  Follow these guidelines when caring for yourself at home:    Eat unprocessed foods that are high in fiber. Whole-grains, fruits, and vegetables are good choices.    Drink 6 to 8 glasses of water every day unless your healthcare provider has you limit how much fluid you should have.    Watch for changes in your bowel movements. Tell your provider if you notice any changes.    Begin an exercise program. Ask your provider how to get started. Generally, walking is the best.    Get plenty of rest and sleep.  Follow-up care  Follow up with your healthcare provider, or as advised. Regular visits may be needed to check on your health. Sometimes special procedures such as colonoscopy, are needed after an episode of diverticulitis or blooding. Be sure to keep all your appointments.  If a stool sample was taken, or cultures were done, you should be told if they are positive, or if your treatment needs to be changed. You can call as directed for the results.  If X-rays were done, a radiologist will look at them. You will be told if there is a change in your treatment.  If antibiotics were prescribed, be sure to finish them all.  When to seek medical advice  Call your healthcare provider right away if any of these occur:    Fever of 100.4 F (38 C) or higher, or as directed by your healthcare provider    Severe cramps in the lower left side of the abdomen or pain that is getting worse    Tenderness in the lower left side of the abdomen or worsening pain throughout the abdomen    Diarrhea or constipation that doesn't get better within 24 hours    Nausea and vomiting    Bleeding from the rectum  Call 911  Call 911 if any of the following occur:    Trouble breathing    Confusion    Very drowsy or trouble  awakening    Fainting or loss of consciousness    Rapid heart rate    Chest pain  [employer] last reviewed this educational content on 6/1/2018 2000-2021 [employer]. All rights reserved. This information is not intended as a substitute for professional medical care. Always follow your healthcare professional's instructions.           Patient Education     Diet for Diverticular Disease  What is diverticular disease?   When the inner wall of your colon weakens and forms small pouches, you have diverticulosis. For most people, this causes no symptoms.   If the pouches become inflamed or infected, you have diverticulitis. Warning signs may include pain in the lower abdomen, chills and vomiting (throwing up).  These conditions are called diverticular disease.  What causes it?  The cause has been linked to many years of eating too little fiber. People who eat plenty of whole grains, fresh fruits and vegetables are less likely to get this disease.   Once the pouches have formed, there is no way to reverse them.   How much fiber should I get?  If you're healing from diverticulitis or surgery to remove the inflamed area, you will at first follow a low-fiber diet (less than 10 grams each day). Then, as your doctor advises, you may slowly add fiber. Increase your intake by 1 to 2 grams a day. Your goal will be 25 to 30 grams a day.   To avoid future problems, continue to get 25 to 30 grams of fiber each day.   How can fiber help?   A high-fiber diet will help you have regular bowel movements. Fiber adds bulk to the stool and helps it pass more easily. Fiber also helps prevent the pouches from growing larger or getting infected.  In the past, doctors have warned patients to avoid eating nuts, seeds and popcorn. They believed these foods could get caught in the pouches and inflame them. But recent studies do not support this idea.   Please ask your dietitian for the handout Fiber Content in [employer]. It will  show you how to get more fiber in your diet.  For informational purposes only. Not to replace the advice of your health care provider. Copyright   2007 [employer]. All rights reserved. Clinically reviewed by [employer]. TEVIZZ 506705 - REV 09/19.

## 2021-05-17 NOTE — PROGRESS NOTES
Kelsea is a 35 year old who is being evaluated via a billable telephone visit.      What phone number would you like to be contacted at? [phone]  How would you like to obtain your AVS? MyChart  Phone call duration:  minutes  Minh Whittington CMA

## 2021-05-18 ENCOUNTER — VIRTUAL VISIT (OUTPATIENT)
Dept: FAMILY MEDICINE | Facility: CLINIC | Age: 36
End: 2021-05-18
Payer: COMMERCIAL

## 2021-05-18 DIAGNOSIS — R61 NIGHT SWEATS: ICD-10-CM

## 2021-05-18 DIAGNOSIS — R68.89 HEAT INTOLERANCE: Primary | ICD-10-CM

## 2021-05-18 DIAGNOSIS — R20.2 PARESTHESIAS: ICD-10-CM

## 2021-05-18 PROCEDURE — 99214 OFFICE O/P EST MOD 30 MIN: CPT | Mod: 95 | Performed by: PREVENTIVE MEDICINE

## 2021-05-18 NOTE — PROGRESS NOTES
"Kelsea is a 35 year old who is being evaluated via a billable video visit.      How would you like to obtain your AVS? MyChart  If the video visit is dropped, the invitation should be resent by: Text to cell phone: [phone]  Will anyone else be joining your video visit? No      Video Start Time: 2:50 PM    Assessment & Plan     Heat intolerance  -No drenching night sweats  -no respiratory symptoms  -No unintentional weight loss  - CBC with platelets differential  - Comprehensive metabolic panel  - TSH with free T4 reflex  - Vitamin B12  - Vitamin D Deficiency  - ESR: Erythrocyte sedimentation rate  - CRP, inflammation  -some exercise intolerance, ECHO Normal 7/2020 after delivery, hence post partum cardiomyopathy is less concerning     Night sweats  - CBC with platelets differential  - Comprehensive metabolic panel  - TSH with free T4 reflex  - Vitamin B12  - Vitamin D Deficiency  - ESR: Erythrocyte sedimentation rate  - CRP, inflammation    Paresthesias  -Localized to the axilla  -symptoms for several years  -No surgeries in the area  -No shingles infections  -If labs normal but symptoms persist, then would recommend in person evaluation.       Review of external notes as documented elsewhere in note  30 minutes spent on the date of the encounter doing chart review, history and exam, documentation and further activities per the note       BMI:   Estimated body mass index is 38.33 kg/m  as calculated from the following:    Height as of 4/9/21: 1.727 m (5' 8\").    Weight as of 5/7/21: 114.4 kg (252 lb 1.6 oz).       Return in about 1 day (around 5/19/2021) for labs.    Skylar Coles MD MPH    [facility]    Ashok Enamorado is a 35 year old who presents for the following health issues:    HPI       Night sweats:  Has had night sweats for the last few months  No fever  Also having numbness in the armpit   Does not have a PCP  Numbness and tingling in the left armpit area  Had " mammogram a few years ago.  These symptoms have been present for a few years  Delivered in July, about 10 months ago  Menses monthly  Last 4 months has woken up sweating  Feels clammy  Spoke with GYN  No weight loss, 15 pound lost intentional  No lumps felt in the neck  No rash  No new joint pains  No recent covid infections  No changes in bowel movements.  No cough  No shortness of breath  No travel  No TB exposure  No diabetes   Generalized sweating  Some heat intolerance   Working up a sweat faster than before  No dizziness  No orthopnea or PND  No leg edema  No exertional chest pain or heaviness   ECHO normal 7/20/2020, reviewed via Care Everywhere:     Interpretation Summary    * Technically difficult study.    * The left ventricle is normal in size and function. EF is 60-65%. Wall  motion is normal.    * The right ventricle is normal in size and function.    * There is no hemodynamically significant valvular heart disease.    * No pericardial effusion.    * The inferior vena cava is normal in size (< 2.1 cm), > 50% respiratory  variance, RA pressure normal at 3 mmHg.    * No prior study available for comparison..    Review of Systems   Constitutional, HEENT, cardiovascular, pulmonary, gi and gu systems are negative, except as otherwise noted.      Objective           Vitals:  No vitals were obtained today due to virtual visit.    Physical Exam   GENERAL: Healthy, alert and no distress  EYES: Eyes grossly normal to inspection.  No discharge or erythema, or obvious scleral/conjunctival abnormalities.  RESP: No audible wheeze, cough, or visible cyanosis.  No visible retractions or increased work of breathing.    SKIN: Visible skin clear. No significant rash, abnormal pigmentation or lesions.  NEURO: Cranial nerves grossly intact.  Mentation and speech appropriate for age.  PSYCH: Mentation appears normal, affect normal/bright, judgement and insight intact, normal speech and appearance well-groomed.           Video-Visit Details    Type of service:  Video Visit    Video End Time:3:03 PM    Originating Location (pt. Location): Other Car    Distant Location (provider location):  [facility]     Platform used for Video Visit: Dianna

## 2021-07-25 ENCOUNTER — NURSE TRIAGE (OUTPATIENT)
Dept: NURSING | Facility: CLINIC | Age: 36
End: 2021-07-25

## 2021-07-25 NOTE — TELEPHONE ENCOUNTER
Outbound call 10:30 AM    Patient reports that her eyesight has significantly improved, blurred vision is almost gone after proper eye irrigation.    Advised to continue to monitor at home.  Advised to avoid contact lenses and uses eyeglasses for now.    Pt verbalized understanding.    Hillary Frausto RN/[facility] Nurse Advisor

## 2021-07-25 NOTE — TELEPHONE ENCOUNTER
Kelsea reports that she has blurred vision on left eye after placing her contacts with possible deodorant in it.     Has tried flushing her eyes briefly for a few seconds earlier. Changed contact lenses. Both did not help and blurred vision on left eye persists.    No eye pain, no redness.    PLAN  Per protocol, advised home care. Care advice reviewed.    EYE IRRIGATION METHOD   * Immerse the entire face into a sink or basin filled with tap water  * With the face under water, open and close the eyelids. You may need to use your fingers. Look from side to side.  * For HARMLESS SUBSTANCES (e.g., sunscreen or hair spray), irrigation only needs to be carried out for 2-3 minutes.     If blurred vision persists an hour after proper irrigation, head to ED.    Patient verbalizes understanding. Advised to call back with further questions/concerns.     Hillary Frausto RN/[place] Nurse Advisor        Reason for Disposition    Harmless chemical (see list in Background) in the eye    Additional Information    Negative: Acid or alkali was the chemical  (Exceptions: mild household bleach or ammonia)    Negative: [1] Unknown chemical AND [2] any eye symptoms (e.g., pain)    Negative: [1] Harmful or possibly harmful chemical AND [2] unable to carry out irrigation (at home, work)    Negative: Shortness of breath    Negative: Cloudy spot or sore on the cornea (clear central part of eye)    Protocols used: EYE - CHEMICAL IN-A-

## 2021-08-04 ENCOUNTER — ANCILLARY PROCEDURE (OUTPATIENT)
Dept: MRI IMAGING | Facility: CLINIC | Age: 36
End: 2021-08-04
Attending: ORTHOPAEDIC SURGERY
Payer: COMMERCIAL

## 2021-08-04 ENCOUNTER — LAB (OUTPATIENT)
Dept: LAB | Facility: CLINIC | Age: 36
End: 2021-08-04
Payer: COMMERCIAL

## 2021-08-04 DIAGNOSIS — M54.50 LUMBAR PAIN: ICD-10-CM

## 2021-08-04 DIAGNOSIS — R61 NIGHT SWEATS: ICD-10-CM

## 2021-08-04 DIAGNOSIS — M54.16 LUMBAR RADICULOPATHY: ICD-10-CM

## 2021-08-04 DIAGNOSIS — R68.89 HEAT INTOLERANCE: ICD-10-CM

## 2021-08-04 DIAGNOSIS — D18.00 HEMANGIOMA: ICD-10-CM

## 2021-08-04 LAB
ALBUMIN SERPL-MCNC: 3.8 G/DL (ref 3.4–5)
ALP SERPL-CCNC: 73 U/L (ref 40–150)
ALT SERPL W P-5'-P-CCNC: 33 U/L (ref 0–50)
ANION GAP SERPL CALCULATED.3IONS-SCNC: 2 MMOL/L (ref 3–14)
AST SERPL W P-5'-P-CCNC: 19 U/L (ref 0–45)
BASOPHILS # BLD AUTO: 0.1 10E3/UL (ref 0–0.2)
BASOPHILS NFR BLD AUTO: 1 %
BILIRUB SERPL-MCNC: 0.2 MG/DL (ref 0.2–1.3)
BUN SERPL-MCNC: 13 MG/DL (ref 7–30)
CALCIUM SERPL-MCNC: 8.6 MG/DL (ref 8.5–10.1)
CHLORIDE BLD-SCNC: 109 MMOL/L (ref 94–109)
CO2 SERPL-SCNC: 30 MMOL/L (ref 20–32)
CREAT SERPL-MCNC: 0.71 MG/DL (ref 0.52–1.04)
CRP SERPL-MCNC: 5.8 MG/L (ref 0–8)
EOSINOPHIL # BLD AUTO: 0.1 10E3/UL (ref 0–0.7)
EOSINOPHIL NFR BLD AUTO: 1 %
ERYTHROCYTE [DISTWIDTH] IN BLOOD BY AUTOMATED COUNT: 11.8 % (ref 10–15)
ERYTHROCYTE [SEDIMENTATION RATE] IN BLOOD BY WESTERGREN METHOD: 13 MM/HR (ref 0–20)
GFR SERPL CREATININE-BSD FRML MDRD: >90 ML/MIN/1.73M2
GLUCOSE BLD-MCNC: 81 MG/DL (ref 70–99)
HCT VFR BLD AUTO: 39.3 % (ref 35–47)
HGB BLD-MCNC: 12.8 G/DL (ref 11.7–15.7)
IMM GRANULOCYTES # BLD: 0 10E3/UL
IMM GRANULOCYTES NFR BLD: 0 %
LYMPHOCYTES # BLD AUTO: 2.2 10E3/UL (ref 0.8–5.3)
LYMPHOCYTES NFR BLD AUTO: 25 %
MCH RBC QN AUTO: 28.4 PG (ref 26.5–33)
MCHC RBC AUTO-ENTMCNC: 32.6 G/DL (ref 31.5–36.5)
MCV RBC AUTO: 87 FL (ref 78–100)
MONOCYTES # BLD AUTO: 0.9 10E3/UL (ref 0–1.3)
MONOCYTES NFR BLD AUTO: 10 %
NEUTROPHILS # BLD AUTO: 5.3 10E3/UL (ref 1.6–8.3)
NEUTROPHILS NFR BLD AUTO: 63 %
NRBC # BLD AUTO: 0 10E3/UL
NRBC BLD AUTO-RTO: 0 /100
PLATELET # BLD AUTO: 277 10E3/UL (ref 150–450)
POTASSIUM BLD-SCNC: 4.3 MMOL/L (ref 3.4–5.3)
PROT SERPL-MCNC: 7.3 G/DL (ref 6.8–8.8)
RBC # BLD AUTO: 4.5 10E6/UL (ref 3.8–5.2)
SODIUM SERPL-SCNC: 141 MMOL/L (ref 133–144)
T4 FREE SERPL-MCNC: 0.89 NG/DL (ref 0.76–1.46)
TSH SERPL DL<=0.005 MIU/L-ACNC: 4.59 MU/L (ref 0.4–4)
VIT B12 SERPL-MCNC: 555 PG/ML (ref 193–986)
WBC # BLD AUTO: 8.6 10E3/UL (ref 4–11)

## 2021-08-04 PROCEDURE — 84439 ASSAY OF FREE THYROXINE: CPT

## 2021-08-04 PROCEDURE — 85652 RBC SED RATE AUTOMATED: CPT

## 2021-08-04 PROCEDURE — 72148 MRI LUMBAR SPINE W/O DYE: CPT | Performed by: RADIOLOGY

## 2021-08-04 PROCEDURE — 36415 COLL VENOUS BLD VENIPUNCTURE: CPT

## 2021-08-04 PROCEDURE — 82607 VITAMIN B-12: CPT

## 2021-08-04 PROCEDURE — 80050 GENERAL HEALTH PANEL: CPT

## 2021-08-04 PROCEDURE — 86140 C-REACTIVE PROTEIN: CPT

## 2021-08-04 PROCEDURE — 72146 MRI CHEST SPINE W/O DYE: CPT | Performed by: RADIOLOGY

## 2021-08-04 PROCEDURE — 82306 VITAMIN D 25 HYDROXY: CPT

## 2021-08-04 NOTE — RESULT ENCOUNTER NOTE
Kelsea,     Marker of inflammation ESR is normal.    Please do not hesitate to call us at [phone] if you have any questions or concerns.    Thank you,    Skylar Coles MD MPH

## 2021-08-04 NOTE — RESULT ENCOUNTER NOTE
Kelsea,   Electrolytes, glucose, kidney function and liver function tests are normal.     Please do not hesitate to call us at [phone] if you have any questions or concerns.    Thank you,    Skylar Coles MD MPH

## 2021-08-04 NOTE — RESULT ENCOUNTER NOTE
Kelsea,     Basic blood count is not showing anemia or infection. Other results are pending.     Please do not hesitate to call us at [phone] if you have any questions or concerns.    Thank you,    Skylar Coles MD MPH

## 2021-08-05 LAB — DEPRECATED CALCIDIOL+CALCIFEROL SERPL-MC: 24 UG/L (ref 20–75)

## 2021-08-06 DIAGNOSIS — R94.6 ABNORMAL FINDING ON THYROID FUNCTION TEST: Primary | ICD-10-CM

## 2021-08-06 NOTE — RESULT ENCOUNTER NOTE
Kelsea,     Vitamin D, and Vitamin B 12 levels are normal.    Thyroid function TSH is borderline low. I would recommend we recheck this in 4 weeks. Future lab orders are in the system, please make an appointment for a lab only visit.     Please do not hesitate to call us at [phone] if you have any questions or concerns.    Thank you,    Skylar Coles MD MPH

## 2021-08-10 ENCOUNTER — VIRTUAL VISIT (OUTPATIENT)
Dept: ORTHOPEDICS | Facility: CLINIC | Age: 36
End: 2021-08-10
Payer: COMMERCIAL

## 2021-08-10 ENCOUNTER — TELEPHONE (OUTPATIENT)
Dept: ORTHOPEDICS | Facility: CLINIC | Age: 36
End: 2021-08-10

## 2021-08-10 DIAGNOSIS — M54.16 LUMBAR RADICULOPATHY: Primary | ICD-10-CM

## 2021-08-10 DIAGNOSIS — D18.09 HEMANGIOMA OF OTHER SITES: ICD-10-CM

## 2021-08-10 PROCEDURE — 99213 OFFICE O/P EST LOW 20 MIN: CPT | Mod: 95 | Performed by: ORTHOPAEDIC SURGERY

## 2021-08-10 NOTE — PROGRESS NOTES
Kelsea is a 36 year old who is being evaluated via a billable telephone visit.      What phone number would you like to be contacted at? Home  How would you like to obtain your AVS? [place]  Phone call duration: 13 minutes    Diagnosis thoracic hemangioma    I called and spoke with Kelsea to review her recent thoracic and lumbar MRIs.  Her previous lumbar disc herniation has largely improved.  There is been no change in the thoracic meningioma at T11.  Based on this it is likely benign and I would not recommend further imaging.  She did have a number of questions about some numbness in her upper back and I do not think this is related and I explained I do not see any severe stenosis on the thoracic MRI.  Its very mild and does not disabling and is not associated with any weakness.  I told her potential there could be an issue in the cervical spine but she denies any radicular or upper extremity complaints and we agreed to just monitor this for now.  She will let me know if any other questions arise.    Humberto Gomez MD

## 2021-08-10 NOTE — LETTER
8/10/2021         RE: Kelsea Murray  [address] [address]        Dear Colleague,    Thank you for referring your patient, Kelsea Murray, to the [facility] [place]. Please see a copy of my visit note below.    Kelsea is a 36 year old who is being evaluated via a billable telephone visit.      What phone number would you like to be contacted at? Home  How would you like to obtain your AVS? MyChart  Phone call duration: 13 minutes    Diagnosis thoracic hemangioma    I called and spoke with Kelsea to review her recent thoracic and lumbar MRIs.  Her previous lumbar disc herniation has largely improved.  There is been no change in the thoracic meningioma at T11.  Based on this it is likely benign and I would not recommend further imaging.  She did have a number of questions about some numbness in her upper back and I do not think this is related and I explained I do not see any severe stenosis on the thoracic MRI.  Its very mild and does not disabling and is not associated with any weakness.  I told her potential there could be an issue in the cervical spine but she denies any radicular or upper extremity complaints and we agreed to just monitor this for now.  She will let me know if any other questions arise.    Humberto Gomez MD

## 2021-08-10 NOTE — TELEPHONE ENCOUNTER
RN called and spoke with patient. Per Dr. Gomez, he would like a phone visit with patient. Patient agreed. RN scheduled for today.    Bhavesh Haque RN

## 2021-09-27 ENCOUNTER — NURSE TRIAGE (OUTPATIENT)
Dept: NURSING | Facility: CLINIC | Age: 36
End: 2021-09-27

## 2021-09-27 ENCOUNTER — TELEPHONE (OUTPATIENT)
Dept: FAMILY MEDICINE | Facility: CLINIC | Age: 36
End: 2021-09-27

## 2021-09-28 NOTE — TELEPHONE ENCOUNTER
I received call from [facility], patient complaining of worsening eye pain following exposure to chemical.  Recommended that she go to the emergency room immediately to be evaluated.    Augusto Frank MD, FAAFP  Family Medicine Physician  [facility]  [address] [place] [address]

## 2021-09-28 NOTE — TELEPHONE ENCOUNTER
"Kelsea reports dull aching Rt eye pain that started this evening after an episode of getting a chemical in her eye earlier this afternoon    ~1 pm - a peppermint oil based rodent repellant splashed in her Rt eye when she states the container \"exploded\"  \"Rodent  Pest Control\"    She irrigated her Rt eye and then called Poison Control who determined the product is not acidic or alkaline.  They advised irrigating for 15 minutes and that mild redness or irritation is expected.    She flushed her eye for 15 minutes  She reports that there was some redness and irritation but that it seemed to be improving over the afternoon.    Tonight, since about 6:45 pm, she has started having an intermittent, dull, achy pain when moving her eye.  She rates the pain ~1/10  She is concerned that she may have irrigated/flushed the eye with too great of water pressure.    Per MSDS:  HMIS Ratings: Health: 1 Fire: 0 Reactivity:0  Hazard Scale: 0= Minimal 1= Slight 2= Moderate 3= Serious 4= Severe  Section 3- Composition / Information on Ingredients  The complete ingredient list for the finished product is as follows:  Active Ingredients: 2.7% Peppermint Oil (RYAN No. 24184-18-0) and 0.1% Sodium  Lauryl Sulfate (RYAN No. 151-21-3).  Inert Ingredients: Propylene Carbonate (RYAN No. 108-32-7), Sodium Benzoate (RYAN No.  532-32-1) and Water (RYAN No. 7732-18-5).  The product does not contain ingredients considered hazardous as defined by OSHA, 29  IRR1474.1200 and/or WHMIS under the [facility]  Peppermint oil based    Per protocol, ER advised or PCP triage. Notified that on-call provider would be paged & RN will call back    8:11 pm - Smart web page sent to on-call provider, Dr. Augusto Frank:  [facility]  [phone]  Pt: Kelsea Murray  : 87  OD dull ache >1hr tonight; ~1pm - Peppermint oil Rodent repellent in eye; Rated 1/10 with some eye movement  MRN: [id number]  PCP: Sanket    8:21 pm - Call received from Dr. Frank. ER " advised    8:32 pm - Notified pt of MD advice, noted above    COVID 19 Nurse Triage Plan/Patient Instructions    Please be aware that novel coronavirus (COVID-19) may be circulating in the community. If you develop symptoms such as fever, cough, or SOB or if you have concerns about the presence of another infection including coronavirus (COVID-19), please contact your health care provider or visit [email or web address].     Disposition/Instructions    ED Visit recommended. Follow protocol based instructions.     Bring Your Own Device:  Please also bring your smart device(s) (smart phones, tablets, laptops) and their charging cables for your personal use and to communicate with your care team during your visit.    Thank you for taking steps to prevent the spread of this virus.  o Limit your contact with others.  o Wear a simple mask to cover your cough.  o Wash your hands well and often.    Resources    [facility]: About COVID-19: [email or web address]    CDC: What to Do If You're Sick: www.cdc.gov/coronavirus/2019-ncov/about/steps-when-sick.html    CDC: Ending Home Isolation: www.cdc.gov/coronavirus/2019-ncov/hcp/disposition-in-home-patients.html     CDC: Caring for Someone: www.cdc.gov/coronavirus/2019-ncov/if-you-are-sick/care-for-someone.html     [facility]: Interim Guidance for Hospital Discharge to Home: www.health.[facility].[place].us/diseases/coronavirus/hcp/hospdischarge.pdf    [facility] clinical trials (COVID-19 research studies): [email or web address].[email or web address]-clinical-trials     Below are the COVID-19 hotlines at the [place] Department of [facility] ([facility]). Interpreters are available.   o For health questions: Call 769-234-4288 or 1-816.668.5553 (7 a.m. to 7 p.m.)  o For questions about schools and childcare: Call 850-837-9428 or 1-576.419.4424 (7 a.m. to 7 p.m.)     Anel Hsieh RN  [facility]      Reason for Disposition    [1] Eye pain AND [2] persists > 1  hour since irrigation (regardless of duration of flushing)    Additional Information    Negative: Acid or alkali was the chemical  (Exceptions: mild household bleach or ammonia)    Negative: [1] Unknown chemical AND [2] any eye symptoms (e.g., pain)    Negative: [1] Harmful or possibly harmful chemical AND [2] unable to carry out irrigation (at home, work)    Negative: Shortness of breath    Negative: Cloudy spot or sore on the cornea (clear central part of eye)    Negative: [1] Blurred vision AND [2] persists > 1 hour since irrigation (regardless of duration of flushing)    Protocols used: EYE - CHEMICAL IN-A-AH

## 2021-10-09 ENCOUNTER — HEALTH MAINTENANCE LETTER (OUTPATIENT)
Age: 36
End: 2021-10-09

## 2021-11-11 ENCOUNTER — LAB (OUTPATIENT)
Dept: LAB | Facility: CLINIC | Age: 36
End: 2021-11-11

## 2021-11-11 DIAGNOSIS — O09.11 PREGNANCY IN FIRST TRIMESTER WITH HISTORY OF ECTOPIC PREGNANCY: ICD-10-CM

## 2021-11-11 DIAGNOSIS — R94.6 ABNORMAL FINDING ON THYROID FUNCTION TEST: Primary | ICD-10-CM

## 2021-11-15 ENCOUNTER — LAB (OUTPATIENT)
Dept: LAB | Facility: CLINIC | Age: 36
End: 2021-11-15
Payer: COMMERCIAL

## 2021-11-15 DIAGNOSIS — R94.6 ABNORMAL FINDING ON THYROID FUNCTION TEST: ICD-10-CM

## 2021-11-15 DIAGNOSIS — O09.11 PREGNANCY IN FIRST TRIMESTER WITH HISTORY OF ECTOPIC PREGNANCY: ICD-10-CM

## 2021-11-15 LAB
B-HCG SERPL-ACNC: 96 IU/L (ref 0–5)
T4 FREE SERPL-MCNC: 0.93 NG/DL (ref 0.76–1.46)
TSH SERPL DL<=0.005 MIU/L-ACNC: 3.12 MU/L (ref 0.4–4)

## 2021-11-15 PROCEDURE — 84702 CHORIONIC GONADOTROPIN TEST: CPT

## 2021-11-15 PROCEDURE — 84439 ASSAY OF FREE THYROXINE: CPT

## 2021-11-15 PROCEDURE — 86376 MICROSOMAL ANTIBODY EACH: CPT

## 2021-11-15 PROCEDURE — 36415 COLL VENOUS BLD VENIPUNCTURE: CPT

## 2021-11-15 PROCEDURE — 84443 ASSAY THYROID STIM HORMONE: CPT

## 2021-11-16 LAB — THYROPEROXIDASE AB SERPL-ACNC: <10 IU/ML

## 2021-11-16 NOTE — RESULT ENCOUNTER NOTE
Kelsea,     Thyroid function labs are normal.  Blood pregnancy test is positive. Please make sure that you are seeing your Gynecologist for this.     Please do not hesitate to call us at [phone] if you have any questions or concerns.    Thank you,    Skylar Coles MD MPH

## 2021-11-17 ENCOUNTER — LAB (OUTPATIENT)
Dept: LAB | Facility: CLINIC | Age: 36
End: 2021-11-17
Payer: COMMERCIAL

## 2021-11-17 DIAGNOSIS — O09.11 PREGNANCY IN FIRST TRIMESTER WITH HISTORY OF ECTOPIC PREGNANCY: Primary | ICD-10-CM

## 2021-11-17 DIAGNOSIS — O09.11 PREGNANCY IN FIRST TRIMESTER WITH HISTORY OF ECTOPIC PREGNANCY: ICD-10-CM

## 2021-11-17 LAB — B-HCG SERPL-ACNC: 238 IU/L (ref 0–5)

## 2021-11-17 PROCEDURE — 84702 CHORIONIC GONADOTROPIN TEST: CPT

## 2021-11-17 PROCEDURE — 36415 COLL VENOUS BLD VENIPUNCTURE: CPT

## 2021-11-18 NOTE — RESULT ENCOUNTER NOTE
Kelsea,     Thyroid peroxidase levels are in a normal range.     Please do not hesitate to call us at [phone] if you have any questions or concerns.    Thank you,    Skylar Coles MD MPH

## 2021-11-19 ENCOUNTER — LAB (OUTPATIENT)
Dept: LAB | Facility: CLINIC | Age: 36
End: 2021-11-19
Payer: COMMERCIAL

## 2021-11-19 DIAGNOSIS — O09.11 PREGNANCY IN FIRST TRIMESTER WITH HISTORY OF ECTOPIC PREGNANCY: ICD-10-CM

## 2021-11-19 LAB — B-HCG SERPL-ACNC: 495 IU/L (ref 0–5)

## 2021-11-19 PROCEDURE — 84702 CHORIONIC GONADOTROPIN TEST: CPT

## 2021-11-19 PROCEDURE — 36415 COLL VENOUS BLD VENIPUNCTURE: CPT

## 2021-11-22 DIAGNOSIS — O09.11 PREGNANCY IN FIRST TRIMESTER WITH HISTORY OF ECTOPIC PREGNANCY: Primary | ICD-10-CM

## 2021-11-22 NOTE — RESULT ENCOUNTER NOTE
Hi,Pregnancy hormone has increased normally.  I recommend you repeat the pregnancy hormone level today if you can.  Plan to schedule the ultrasound for Friday unless your symptoms have worsened.    Thanks!  Dr. Ocasio

## 2021-11-26 ENCOUNTER — ANCILLARY PROCEDURE (OUTPATIENT)
Dept: ULTRASOUND IMAGING | Facility: CLINIC | Age: 36
End: 2021-11-26
Attending: OBSTETRICS & GYNECOLOGY
Payer: COMMERCIAL

## 2021-11-26 ENCOUNTER — LAB (OUTPATIENT)
Dept: LAB | Facility: CLINIC | Age: 36
End: 2021-11-26

## 2021-11-26 DIAGNOSIS — O09.11 PREGNANCY IN FIRST TRIMESTER WITH HISTORY OF ECTOPIC PREGNANCY: Primary | ICD-10-CM

## 2021-11-26 DIAGNOSIS — O09.11 PREGNANCY IN FIRST TRIMESTER WITH HISTORY OF ECTOPIC PREGNANCY: ICD-10-CM

## 2021-11-26 LAB
B-HCG SERPL-ACNC: 1755 IU/L (ref 0–5)
RADIOLOGIST FLAGS: ABNORMAL

## 2021-11-26 PROCEDURE — 36415 COLL VENOUS BLD VENIPUNCTURE: CPT

## 2021-11-26 PROCEDURE — 76817 TRANSVAGINAL US OBSTETRIC: CPT

## 2021-11-26 PROCEDURE — 84702 CHORIONIC GONADOTROPIN TEST: CPT

## 2021-11-26 PROCEDURE — 76801 OB US < 14 WKS SINGLE FETUS: CPT

## 2021-11-26 NOTE — RESULT ENCOUNTER NOTE
Hi,    Your pregnancy hormone has increased, but not as much as I would expect over the last week.  It is not at a level that is consistent with a molar pregnancy.  I still cannot determine if this is an ectopic or not, but I am suspicious that it may be.  I recommend we check the pregnancy hormone level again on Monday and have you see me in the office to discuss the results.    I have no openings, but we will work you in.  Please come in for a lab appointment first thing in the morning.  I will plan to see you about 1030 am hoping that will give us enough time to get the results back. We will discuss whether or not a D&C is warranted based on those results and any symptoms you have.  If you have severe pain or bleeding over the weekend, please go to the ED.    Thanks,  Dr. Ocasio

## 2021-11-29 ENCOUNTER — ANCILLARY PROCEDURE (OUTPATIENT)
Dept: ULTRASOUND IMAGING | Facility: OTHER | Age: 36
End: 2021-11-29
Attending: OBSTETRICS & GYNECOLOGY
Payer: COMMERCIAL

## 2021-11-29 ENCOUNTER — OFFICE VISIT (OUTPATIENT)
Dept: OBGYN | Facility: CLINIC | Age: 36
End: 2021-11-29
Payer: COMMERCIAL

## 2021-11-29 ENCOUNTER — LAB (OUTPATIENT)
Dept: LAB | Facility: CLINIC | Age: 36
End: 2021-11-29
Payer: COMMERCIAL

## 2021-11-29 VITALS
SYSTOLIC BLOOD PRESSURE: 123 MMHG | BODY MASS INDEX: 35.73 KG/M2 | OXYGEN SATURATION: 100 % | HEART RATE: 78 BPM | WEIGHT: 235 LBS | DIASTOLIC BLOOD PRESSURE: 71 MMHG

## 2021-11-29 DIAGNOSIS — O09.11 PREGNANCY IN FIRST TRIMESTER WITH HISTORY OF ECTOPIC PREGNANCY: Primary | ICD-10-CM

## 2021-11-29 DIAGNOSIS — O09.11 PREGNANCY IN FIRST TRIMESTER WITH HISTORY OF ECTOPIC PREGNANCY: ICD-10-CM

## 2021-11-29 LAB — B-HCG SERPL-ACNC: 2992 IU/L (ref 0–5)

## 2021-11-29 PROCEDURE — 84702 CHORIONIC GONADOTROPIN TEST: CPT

## 2021-11-29 PROCEDURE — 99214 OFFICE O/P EST MOD 30 MIN: CPT | Performed by: OBSTETRICS & GYNECOLOGY

## 2021-11-29 PROCEDURE — 76801 OB US < 14 WKS SINGLE FETUS: CPT | Performed by: RADIOLOGY

## 2021-11-29 PROCEDURE — 76817 TRANSVAGINAL US OBSTETRIC: CPT | Performed by: RADIOLOGY

## 2021-11-29 PROCEDURE — 36415 COLL VENOUS BLD VENIPUNCTURE: CPT

## 2021-11-29 NOTE — PATIENT INSTRUCTIONS
If you have labs or imaging done, the results will automatically release in [employer] without an interpretation.  Your health care professional will review those results and send an interpretation with recommendations as soon as possible, but this may be 1-3 business days.    If you have any questions regarding your visit, please contact your care team.     [employer]: [phone]  [facility] CLINIC HOURS TELEPHONE NUMBER       MD Lesa Thompson - NYA Mireles-MAO White-MAO Bucio-MAO Griffith-  Michelle-     Monday- [place]  8:00 a.m - 5:00 p.m    Tuesday- Surgery    Wednesday- Admin    Thursday- [place]  8:00 a.m - 5:00 p.m.    Friday- [place]  7:30 a.m - 4:00 p.m. [facility]  [address]  [place] [address]  [phone] Fax  Imaging [phone]    [facility] [place] [facility]  [address] [place] [address] Dr.  [place], [place] [address] [phone]    [facility]  [address] [place] [address] [phone] Fax  Imaging [phone]     Urgent Care locations:    [facility] Monday-Friday  10 am - 8 pm  Saturday and Sunday   9 am - 5 pm  Monday-Friday   10 am - 8 pm  Saturday and Sunday   9 am - 5 pm   [phone]     If you need a medication refill, please contact your pharmacy. Please allow 3 business days for your refill to be completed.    As always, thank you for trusting us with your healthcare needs!

## 2021-11-29 NOTE — PROGRESS NOTES
OB/GYN      NAME:  Kelsea Murray  PCP:  Elda Laughlin Lori Borja  MRN:  [id number]    Impression / Plan     36 year old  with:      ICD-10-CM    1. Pregnancy in first trimester with history of ectopic pregnancy  O09.11 US OB <14 Weeks w Transvaginal Single     Asymptomatic COVID-19 Virus (Coronavirus) by PCR       Discussed labs.  Plan ultrasound today.  Unfortunately findings are still inconclusive, but favor ectopic pregnancy based on her history.  I will contact her with the results and recommendations.  If still inconclusive, we will consider a suction dilation and curettage with ultrasound guidance, pathology for frozen section to get more immediate results.  If no chorionic villi seen on frozen, then plan methotrexate.  She understands she will need to go to the emergency department if she develops severe pain or heavy bleeding.      ADDENDUM - US done today and contacted by the radiologist.  Early gestation cannot be excluded.  Plan repeat quant on Wednesday.    Discussed with patient.        Chief Complaint     Chief Complaint   Patient presents with     Follow Up       HPI     Kelsea Murray is a  36 year old female who is seen for pregnancy of unknown location.  Patient continues to have no significant pain.  No abnormal discharge or spotting/bleeding.  A little cramping more so on her right.    She should be about 7 weeks pregnant.    History of right ampullary ectopic pregnancy.  She had a salpingostomy with Dr. Flores in 2018.  His op note was reviewed.      Problem List     Patient Active Problem List    Diagnosis Date Noted     Obesity, morbid, BMI 40.0-49.9 (H) 01/15/2018     Priority: Medium       Medications     Current Outpatient Medications   Medication     pantoprazole (PROTONIX) 40 MG EC tablet     No current facility-administered medications for this visit.        Allergies     No Known Allergies    ROS   pertinent positives and negatives are listed in the HPI.  "    Physical Exam   Vitals: /71 (BP Location: Right arm, Cuff Size: Adult Large)   Pulse 78   Wt 106.6 kg (235 lb)   SpO2 100%   BMI 35.73 kg/m      General: Comfortable, no obvious distress  Abdomen: Soft, nontender.  No rebound or guarding.  :deferred      Labs/Imaging       I have personally reviewed the labs/imaging and the findings were:    Hcg 11/15/21: 96  Hcg 11/17/21: 238  Hcg 11/19/21: 495  Hcg 11/26/21: 1755  US   11/26/21: No GS.  Endometrial stripe 30 mm with scattered small cystic spaced.  Both ovaries normal in appearance, no adnexal mass to suggest ectopic pregnancy.   \"Differential diagnosis includes molar pregnancy\"  Hcg 11/29/21: 2992        30 min spent on the date of the encounter in chart review, patient visit, review of tests, documentation about the issues documented above.     Roseline Ocasio MD     "

## 2021-11-30 ENCOUNTER — MYC MEDICAL ADVICE (OUTPATIENT)
Dept: OBGYN | Facility: CLINIC | Age: 36
End: 2021-11-30
Payer: COMMERCIAL

## 2021-11-30 NOTE — TELEPHONE ENCOUNTER
"Pt started noticing her abdomen around her belly button is more bloated and tender to the touch (\"not severe, only slight\") last night and again today.    Denies constipation, doing any recent heavy lifting or straining of abdominal muscles or history of umbilical hernia.    Pt is wondering if this is a \"normal pregnancy hormone related\" symptom.    Routing to Dr. Ocasio to further comment on pt's symptoms.    Cindy Fernandez RN        "

## 2021-12-01 ENCOUNTER — LAB (OUTPATIENT)
Dept: LAB | Facility: CLINIC | Age: 36
End: 2021-12-01
Payer: COMMERCIAL

## 2021-12-01 ENCOUNTER — TELEPHONE (OUTPATIENT)
Dept: OBGYN | Facility: OTHER | Age: 36
End: 2021-12-01

## 2021-12-01 ENCOUNTER — TELEPHONE (OUTPATIENT)
Dept: OBGYN | Facility: CLINIC | Age: 36
End: 2021-12-01

## 2021-12-01 DIAGNOSIS — O00.101 RIGHT TUBAL PREGNANCY WITHOUT INTRAUTERINE PREGNANCY: ICD-10-CM

## 2021-12-01 DIAGNOSIS — O09.11 PREGNANCY IN FIRST TRIMESTER WITH HISTORY OF ECTOPIC PREGNANCY: Primary | ICD-10-CM

## 2021-12-01 DIAGNOSIS — O00.101 RIGHT TUBAL PREGNANCY WITHOUT INTRAUTERINE PREGNANCY: Primary | ICD-10-CM

## 2021-12-01 LAB
ALT SERPL W P-5'-P-CCNC: 49 U/L (ref 0–50)
AST SERPL W P-5'-P-CCNC: 17 U/L (ref 0–45)
B-HCG SERPL-ACNC: 4161 IU/L (ref 0–5)
CREAT SERPL-MCNC: 0.64 MG/DL (ref 0.52–1.04)
ERYTHROCYTE [DISTWIDTH] IN BLOOD BY AUTOMATED COUNT: 11.9 % (ref 10–15)
GFR SERPL CREATININE-BSD FRML MDRD: >90 ML/MIN/1.73M2
HCT VFR BLD AUTO: 39 % (ref 35–47)
HGB BLD-MCNC: 12.9 G/DL (ref 11.7–15.7)
MCH RBC QN AUTO: 29.2 PG (ref 26.5–33)
MCHC RBC AUTO-ENTMCNC: 33.1 G/DL (ref 31.5–36.5)
MCV RBC AUTO: 88 FL (ref 78–100)
PLATELET # BLD AUTO: 227 10E3/UL (ref 150–450)
RBC # BLD AUTO: 4.42 10E6/UL (ref 3.8–5.2)
WBC # BLD AUTO: 6.8 10E3/UL (ref 4–11)

## 2021-12-01 PROCEDURE — 84460 ALANINE AMINO (ALT) (SGPT): CPT | Performed by: OBSTETRICS & GYNECOLOGY

## 2021-12-01 PROCEDURE — 36415 COLL VENOUS BLD VENIPUNCTURE: CPT

## 2021-12-01 PROCEDURE — 85027 COMPLETE CBC AUTOMATED: CPT

## 2021-12-01 PROCEDURE — 84702 CHORIONIC GONADOTROPIN TEST: CPT

## 2021-12-01 PROCEDURE — 82565 ASSAY OF CREATININE: CPT | Performed by: OBSTETRICS & GYNECOLOGY

## 2021-12-01 PROCEDURE — 84450 TRANSFERASE (AST) (SGOT): CPT | Performed by: OBSTETRICS & GYNECOLOGY

## 2021-12-01 RX ORDER — METHYLPREDNISOLONE SODIUM SUCCINATE 125 MG/2ML
125 INJECTION, POWDER, LYOPHILIZED, FOR SOLUTION INTRAMUSCULAR; INTRAVENOUS
Status: CANCELLED
Start: 2021-12-01

## 2021-12-01 RX ORDER — MEPERIDINE HYDROCHLORIDE 25 MG/ML
25 INJECTION INTRAMUSCULAR; INTRAVENOUS; SUBCUTANEOUS EVERY 30 MIN PRN
Status: CANCELLED | OUTPATIENT
Start: 2021-12-01

## 2021-12-01 RX ORDER — DIPHENHYDRAMINE HYDROCHLORIDE 50 MG/ML
50 INJECTION INTRAMUSCULAR; INTRAVENOUS
Status: CANCELLED
Start: 2021-12-01

## 2021-12-01 RX ORDER — NALOXONE HYDROCHLORIDE 0.4 MG/ML
0.2 INJECTION, SOLUTION INTRAMUSCULAR; INTRAVENOUS; SUBCUTANEOUS
Status: CANCELLED | OUTPATIENT
Start: 2021-12-01

## 2021-12-01 RX ORDER — METHOTREXATE 25 MG/ML
50 INJECTION, SOLUTION INTRA-ARTERIAL; INTRAMUSCULAR; INTRATHECAL; INTRAVENOUS ONCE
Status: CANCELLED | OUTPATIENT
Start: 2021-12-01 | End: 2021-12-01

## 2021-12-01 RX ORDER — ALBUTEROL SULFATE 90 UG/1
1-2 AEROSOL, METERED RESPIRATORY (INHALATION)
Status: CANCELLED
Start: 2021-12-01

## 2021-12-01 RX ORDER — EPINEPHRINE 1 MG/ML
0.3 INJECTION, SOLUTION INTRAMUSCULAR; SUBCUTANEOUS EVERY 5 MIN PRN
Status: CANCELLED | OUTPATIENT
Start: 2021-12-01

## 2021-12-01 RX ORDER — EPINEPHRINE 1 MG/ML
0.3 INJECTION, SOLUTION, CONCENTRATE INTRAVENOUS EVERY 5 MIN PRN
Status: CANCELLED | OUTPATIENT
Start: 2021-12-01

## 2021-12-01 RX ORDER — ALBUTEROL SULFATE 0.83 MG/ML
2.5 SOLUTION RESPIRATORY (INHALATION)
Status: CANCELLED | OUTPATIENT
Start: 2021-12-01

## 2021-12-01 NOTE — TELEPHONE ENCOUNTER
Per Dr. Ocasio's request, RN called [facility] center to attempt to scheduled pt for today or tomorrow morning for methotrexate infusion as pt may be outside of window of time for infusion if waits longer than that.     Provider has placed orders and states pt will not have a Covid test prior to this infusion, pt is fully vaccinated and is asymptomatic.      RN awaiting callback from infusion center on scheduling.    BERNADETTE PompaN RN

## 2021-12-01 NOTE — TELEPHONE ENCOUNTER
Maribell in infusion called clinic back, not able to get pt in today.      Pt is scheduled for tomorrow 12/2/21 at 9:00am for labs and 9:30am infusion appointment. Pt to enter via West entrance, take elevator to lower level to check in down stairs and labs in the infusion center.    RN spoke w/pt to inform of above appt times/location.  Pt was very appreciate of her care from Dr. Ocasio and team.  Pt verbalized understanding and denies questions.    Dr. Ocasio aware of pt appt time.    Rupinder Mendiola, BSN RN

## 2021-12-01 NOTE — TELEPHONE ENCOUNTER
Called patient and discussed results and plan.      Clinically, she appears to have an abnormal pregnancy, most likely an ectopic pregnancy with pseudogestastional sac in the endometrium.  I have discussed with my colleagues and they are in agreement.  Patient has a history of ectopic treated with a salpingectomy.  Pregnancy hormone levels have not increased normally for the past three hcgs.  hcg levels are not in the range I would expect for a molar pregnancy.      Patient is also concerned this may be another ectopic pregnancy.  Patient has had some pinching on the right, some bloating, and some periumbilical discomfort.  No bleeding and is generally asymptomatic for ectopic at this time.  Her pregnancy hormone level is now about 4100.  If we wait too long, we may get to a point where we are out of the window for methotrexate.  Delaying treatment may increase her risk for needing laparoscopic surgery and having life-threatening bleeding from an ectopic.    I recommend MTX, and patient is in agreement.  Discussed risks, benefits and alternatives.  She may have increased discomfort and vaginal bleeding.  She should go to the ED if she has severe pain or heavy bleeding (more than a pad an hour).  She understand she will need close surveillance.      RN is working on getting her set up at the infusion center        Component      Latest Ref Rng & Units 11/15/2021 11/17/2021 11/19/2021 11/26/2021   HCG Quantitative Serum      0 - 5 IU/L 96 (H) 238 (H) 495 (H) 1,755 (H)     Component      Latest Ref Rng & Units 11/29/2021 12/1/2021   HCG Quantitative Serum      0 - 5 IU/L 2,992 (H) 4,161 (H)

## 2021-12-01 NOTE — TELEPHONE ENCOUNTER
RN spoke with infusion center for update on scheduling availability. Charge RN unavailable. Will continue to wait for callback.    BERNADETTE PompaN RN

## 2021-12-02 ENCOUNTER — INFUSION THERAPY VISIT (OUTPATIENT)
Dept: INFUSION THERAPY | Facility: CLINIC | Age: 36
End: 2021-12-02
Payer: COMMERCIAL

## 2021-12-02 ENCOUNTER — LAB (OUTPATIENT)
Dept: LAB | Facility: CLINIC | Age: 36
End: 2021-12-02
Payer: COMMERCIAL

## 2021-12-02 ENCOUNTER — TELEPHONE (OUTPATIENT)
Dept: OBGYN | Facility: OTHER | Age: 36
End: 2021-12-02

## 2021-12-02 VITALS
TEMPERATURE: 98.2 F | RESPIRATION RATE: 14 BRPM | WEIGHT: 245.6 LBS | HEIGHT: 68 IN | HEART RATE: 71 BPM | OXYGEN SATURATION: 100 % | SYSTOLIC BLOOD PRESSURE: 129 MMHG | DIASTOLIC BLOOD PRESSURE: 79 MMHG | BODY MASS INDEX: 37.22 KG/M2

## 2021-12-02 DIAGNOSIS — O09.11 PREGNANCY IN FIRST TRIMESTER WITH HISTORY OF ECTOPIC PREGNANCY: ICD-10-CM

## 2021-12-02 DIAGNOSIS — O00.101 RIGHT TUBAL PREGNANCY WITHOUT INTRAUTERINE PREGNANCY: Primary | ICD-10-CM

## 2021-12-02 LAB
ABO/RH(D): NORMAL
ANTIBODY SCREEN: NEGATIVE
HOLD SPECIMEN: NORMAL
HOLD SPECIMEN: NORMAL
SPECIMEN EXPIRATION DATE: NORMAL

## 2021-12-02 PROCEDURE — 86900 BLOOD TYPING SEROLOGIC ABO: CPT | Performed by: INTERNAL MEDICINE

## 2021-12-02 PROCEDURE — 86901 BLOOD TYPING SEROLOGIC RH(D): CPT | Performed by: INTERNAL MEDICINE

## 2021-12-02 PROCEDURE — 96402 CHEMO HORMON ANTINEOPL SQ/IM: CPT | Performed by: INTERNAL MEDICINE

## 2021-12-02 PROCEDURE — 36415 COLL VENOUS BLD VENIPUNCTURE: CPT | Performed by: INTERNAL MEDICINE

## 2021-12-02 PROCEDURE — 99207 PR NO CHARGE LOS: CPT

## 2021-12-02 PROCEDURE — 86850 RBC ANTIBODY SCREEN: CPT | Performed by: INTERNAL MEDICINE

## 2021-12-02 RX ORDER — METHOTREXATE 25 MG/ML
50 INJECTION, SOLUTION INTRA-ARTERIAL; INTRAMUSCULAR; INTRATHECAL; INTRAVENOUS ONCE
Status: CANCELLED | OUTPATIENT
Start: 2021-12-02 | End: 2021-12-02

## 2021-12-02 RX ORDER — ALBUTEROL SULFATE 90 UG/1
1-2 AEROSOL, METERED RESPIRATORY (INHALATION)
Status: CANCELLED
Start: 2021-12-02

## 2021-12-02 RX ORDER — DIPHENHYDRAMINE HYDROCHLORIDE 50 MG/ML
50 INJECTION INTRAMUSCULAR; INTRAVENOUS
Status: CANCELLED
Start: 2021-12-02

## 2021-12-02 RX ORDER — METHYLPREDNISOLONE SODIUM SUCCINATE 125 MG/2ML
125 INJECTION, POWDER, LYOPHILIZED, FOR SOLUTION INTRAMUSCULAR; INTRAVENOUS
Status: CANCELLED
Start: 2021-12-02

## 2021-12-02 RX ORDER — NALOXONE HYDROCHLORIDE 0.4 MG/ML
0.2 INJECTION, SOLUTION INTRAMUSCULAR; INTRAVENOUS; SUBCUTANEOUS
Status: CANCELLED | OUTPATIENT
Start: 2021-12-02

## 2021-12-02 RX ORDER — MEPERIDINE HYDROCHLORIDE 25 MG/ML
25 INJECTION INTRAMUSCULAR; INTRAVENOUS; SUBCUTANEOUS EVERY 30 MIN PRN
Status: CANCELLED | OUTPATIENT
Start: 2021-12-02

## 2021-12-02 RX ORDER — EPINEPHRINE 1 MG/ML
0.3 INJECTION, SOLUTION INTRAMUSCULAR; SUBCUTANEOUS EVERY 5 MIN PRN
Status: CANCELLED | OUTPATIENT
Start: 2021-12-02

## 2021-12-02 RX ORDER — ALBUTEROL SULFATE 0.83 MG/ML
2.5 SOLUTION RESPIRATORY (INHALATION)
Status: CANCELLED | OUTPATIENT
Start: 2021-12-02

## 2021-12-02 RX ORDER — METHOTREXATE 25 MG/ML
115 INJECTION, SOLUTION INTRA-ARTERIAL; INTRAMUSCULAR; INTRATHECAL; INTRAVENOUS ONCE
Status: COMPLETED | OUTPATIENT
Start: 2021-12-02 | End: 2021-12-02

## 2021-12-02 RX ADMIN — METHOTREXATE 115 MG: 25 INJECTION, SOLUTION INTRA-ARTERIAL; INTRAMUSCULAR; INTRATHECAL; INTRAVENOUS at 10:28

## 2021-12-02 ASSESSMENT — MIFFLIN-ST. JEOR: SCORE: 1857.53

## 2021-12-02 NOTE — TELEPHONE ENCOUNTER
See [facility] message:     Me  to Kelsea Murray MM      12/2/21 10:18 AM  Hi,     I hope your injection went well.  Please return for labs on Monday 12/6/21.   We will also repeat the labs on Thursday 12/9/21.  We are looking for a decrease in the pregnancy hormone level from Monday to next Thursday.  There may be a small bump in the pregnancy hormone level on Monday, but that is ok as long as it decreases Thursday.       As we discussed, you may experience cramping and bleeding.  Please go to the ED if you have severe pain or heavy bleeding.      Thanks,   Dr. Ocasio    Last read by Kelsea Murray at 10:20 AM on 12/2/2021

## 2021-12-02 NOTE — PATIENT INSTRUCTIONS
Patient Education     Methotrexate to Treat an Ectopic Pregnancy  An ectopic pregnancy is when a fertilized egg implants outside the uterus. In most cases, it implants in a fallopian tube. This is a tube that goes from the uterus to an ovary. When this happens, the embryo can t grow normally. In some cases, it may stop growing quickly. Or it may grow until the fallopian tube tears (ruptures). This can cause severe bleeding and a risk for death for the mother.   Methotrexate is a medicine that stops the embryo from growing. The tissue is then absorbed by the mother s body. This treatment can prevent the rupture, bleeding, and risk of death to the mother. Methotrexate is often used instead of surgery to remove the embryo. Surgery has risks such as bleeding, infection, scarring of the fallopian tube, infertility, and the risks of anesthesia.   Having methotrexate treatment  Methotrexate is most often given by a shot (injection) into a muscle. It can also be given through an IV (intravenous) line.   After having the shot, you may have:    Mild belly (abdominal) pain or cramping    Nausea and vomiting    Diarrhea    Extreme tiredness (fatigue)  Care at home  Once you are home, you can resume normal activities as you are able. You will have some bleeding and pain. While you are recovering, you can use acetaminophen for pain if advised by your healthcare provider. You may be told to flush the toilet twice after each use to clear the toilet of methotrexate. Follow your provider's specific instructions.   Until your healthcare provider says it s OK, don't:     Take nonsteroidal anti-inflammatory drugs (NSAIDs), such as aspirin, ibuprofen, or naproxen    Have foods or vitamins that contain folic acid or folate (for example, prenatal vitamins have folate)    Drink alcohol    Take penicillin or certain other antibiotics    Use tampons or douche    Have sex  Make sure to:    Stay out of the sun during the first week after your  shot. Sun can cause a rash during this time.    Use birth control for at least 3 months after treatment.    Talk with a counselor if you feel sadness or grief after pregnancy loss.  Follow up  You will have blood tests several times in the weeks after you have the shot. This is to make sure that your pregnancy hormone (HCG) level is getting lower. This shows that the baby is no longer growing. It may take up to 4 weeks for your level to drop to 0. Most women need only 1 shot. If HCG levels are not low enough, your healthcare provider may give you a second shot. In some cases, this treatment does not work and surgery is needed. Your provider can tell you more about surgery for ectopic pregnancy.   When to call the healthcare provider  Call your healthcare provider if you have:    Lower belly pain that gets worse or doesn t go away    Heavy vaginal bleeding    Nausea or vomiting that needs treatment    Dizziness, weakness, or fainting    Fever of 100.4 F (38 C) or higher, or as directed by your provider  [employer] last reviewed this educational content on 3/1/2020    7774-0823 [employer]. All rights reserved. This information is not intended as a substitute for professional medical care. Always follow your healthcare professional's instructions.

## 2021-12-02 NOTE — PROGRESS NOTES
Infusion Nursing Note:  Kelsea Murray presents today for first Methotrexate injection.    Patient seen by provider today: No   present during visit today: Not Applicable.    Note:   Patient stopped prenatal vitamins a couple of days ago.    Reviewed ectopic pregnancy education, in patient instructions, or the after visit summary.  Answered questions to patient's satisfaction.    Patient states she is sad and became teary.  Grieving loss of this pregnancy.  States she is grateful to have one child but would love another child. Validated feelings. Expressed empathy. Encouraged patient to follow up with Dr Ocasio if sadness worsens.      Intravenous Access:  No Intravenous access/labs at this visit.    Treatment Conditions:  CBC, AST, creat, Rh Factor and hCGx2 have been drawn.    Diagnostic testing has been completed with evidence of ectopic pregnancy documented.    Written consent has been signed, including witness section, and placed in basket to be scanned into chart.    Post Infusion Assessment:  Patient tolerated injections without incident.       Discharge Plan:   Patient education placed in MyChart regarding ectopic pregnancy.  AVS to patient via MYCHART.  Patient will follow up with Dr Ocasio.  Patient discharged in stable condition accompanied by: self.  is .  Departure Mode: Ambulatory.      Niki Boss RN

## 2021-12-02 NOTE — TELEPHONE ENCOUNTER
Received a teams message from Niki, infusion nurse that gave pt her methotrexate infusion this morning.  She stated that she noticed pt doesn't have any follow up labs ordered and is wondering if she needed any follow up labs after receiving the methotrexate today.  I do see that an HCG quant future lab order was ordered today but no lab only appt scheduled.    Routing to Dr. Ocasio to see if and when pt should have an HCG quant level drawn.    Cindy Fernandez RN

## 2021-12-06 ENCOUNTER — LAB (OUTPATIENT)
Dept: LAB | Facility: CLINIC | Age: 36
End: 2021-12-06
Payer: COMMERCIAL

## 2021-12-06 DIAGNOSIS — O00.101 RIGHT TUBAL PREGNANCY WITHOUT INTRAUTERINE PREGNANCY: ICD-10-CM

## 2021-12-06 LAB — B-HCG SERPL-ACNC: 6638 IU/L (ref 0–5)

## 2021-12-06 PROCEDURE — 36415 COLL VENOUS BLD VENIPUNCTURE: CPT

## 2021-12-06 PROCEDURE — 84702 CHORIONIC GONADOTROPIN TEST: CPT

## 2021-12-07 ENCOUNTER — MYC MEDICAL ADVICE (OUTPATIENT)
Dept: OBGYN | Facility: CLINIC | Age: 36
End: 2021-12-07
Payer: COMMERCIAL

## 2021-12-07 DIAGNOSIS — O00.101 RIGHT TUBAL PREGNANCY WITHOUT INTRAUTERINE PREGNANCY: Primary | ICD-10-CM

## 2021-12-07 DIAGNOSIS — N89.8 VAGINAL DISCHARGE: Primary | ICD-10-CM

## 2021-12-07 NOTE — TELEPHONE ENCOUNTER
Pt had a methotrexate injection on 12/2.    Pt is now experiencing vaginal itching and a thick creamy discharge.

## 2021-12-07 NOTE — TELEPHONE ENCOUNTER
Pt had methotrexate injection on 12/2, started having increased thick yellow discharge and itching. Pt denies foul smell or vaginal bleeding.    RN routing to provider for advisement if pt needs wet prep or in clinic evaluation.    Aline Otto RN on 12/7/2021 at 10:00 AM

## 2021-12-09 ENCOUNTER — LAB (OUTPATIENT)
Dept: LAB | Facility: CLINIC | Age: 36
End: 2021-12-09
Payer: COMMERCIAL

## 2021-12-09 DIAGNOSIS — O00.101 RIGHT TUBAL PREGNANCY WITHOUT INTRAUTERINE PREGNANCY: ICD-10-CM

## 2021-12-09 LAB — B-HCG SERPL-ACNC: 6011 IU/L (ref 0–5)

## 2021-12-09 PROCEDURE — 84702 CHORIONIC GONADOTROPIN TEST: CPT

## 2021-12-09 PROCEDURE — 36415 COLL VENOUS BLD VENIPUNCTURE: CPT

## 2021-12-10 ENCOUNTER — INFUSION THERAPY VISIT (OUTPATIENT)
Dept: INFUSION THERAPY | Facility: CLINIC | Age: 36
End: 2021-12-10
Payer: COMMERCIAL

## 2021-12-10 VITALS
SYSTOLIC BLOOD PRESSURE: 123 MMHG | TEMPERATURE: 98.2 F | HEART RATE: 64 BPM | OXYGEN SATURATION: 98 % | RESPIRATION RATE: 16 BRPM | WEIGHT: 245 LBS | DIASTOLIC BLOOD PRESSURE: 83 MMHG | BODY MASS INDEX: 37.25 KG/M2

## 2021-12-10 DIAGNOSIS — O00.101 RIGHT TUBAL PREGNANCY WITHOUT INTRAUTERINE PREGNANCY: Primary | ICD-10-CM

## 2021-12-10 PROCEDURE — 96401 CHEMO ANTI-NEOPL SQ/IM: CPT | Performed by: INTERNAL MEDICINE

## 2021-12-10 PROCEDURE — 99207 PR NO CHARGE LOS: CPT

## 2021-12-10 RX ORDER — EPINEPHRINE 1 MG/ML
0.3 INJECTION, SOLUTION INTRAMUSCULAR; SUBCUTANEOUS EVERY 5 MIN PRN
Status: CANCELLED | OUTPATIENT
Start: 2021-12-10

## 2021-12-10 RX ORDER — DIPHENHYDRAMINE HYDROCHLORIDE 50 MG/ML
50 INJECTION INTRAMUSCULAR; INTRAVENOUS
Status: CANCELLED
Start: 2021-12-10

## 2021-12-10 RX ORDER — NALOXONE HYDROCHLORIDE 0.4 MG/ML
0.2 INJECTION, SOLUTION INTRAMUSCULAR; INTRAVENOUS; SUBCUTANEOUS
Status: CANCELLED | OUTPATIENT
Start: 2021-12-10

## 2021-12-10 RX ORDER — ALBUTEROL SULFATE 90 UG/1
1-2 AEROSOL, METERED RESPIRATORY (INHALATION)
Status: CANCELLED
Start: 2021-12-10

## 2021-12-10 RX ORDER — MEPERIDINE HYDROCHLORIDE 25 MG/ML
25 INJECTION INTRAMUSCULAR; INTRAVENOUS; SUBCUTANEOUS EVERY 30 MIN PRN
Status: CANCELLED | OUTPATIENT
Start: 2021-12-10

## 2021-12-10 RX ORDER — METHOTREXATE 25 MG/ML
50 INJECTION, SOLUTION INTRA-ARTERIAL; INTRAMUSCULAR; INTRATHECAL; INTRAVENOUS ONCE
Status: CANCELLED | OUTPATIENT
Start: 2021-12-10 | End: 2021-12-10

## 2021-12-10 RX ORDER — METHYLPREDNISOLONE SODIUM SUCCINATE 125 MG/2ML
125 INJECTION, POWDER, LYOPHILIZED, FOR SOLUTION INTRAMUSCULAR; INTRAVENOUS
Status: CANCELLED
Start: 2021-12-10

## 2021-12-10 RX ORDER — METHOTREXATE 25 MG/ML
115 INJECTION, SOLUTION INTRA-ARTERIAL; INTRAMUSCULAR; INTRATHECAL; INTRAVENOUS ONCE
Status: COMPLETED | OUTPATIENT
Start: 2021-12-10 | End: 2021-12-10

## 2021-12-10 RX ORDER — ALBUTEROL SULFATE 0.83 MG/ML
2.5 SOLUTION RESPIRATORY (INHALATION)
Status: CANCELLED | OUTPATIENT
Start: 2021-12-10

## 2021-12-10 RX ADMIN — METHOTREXATE 115 MG: 25 INJECTION, SOLUTION INTRA-ARTERIAL; INTRAMUSCULAR; INTRATHECAL; INTRAVENOUS at 09:37

## 2021-12-10 ASSESSMENT — PAIN SCALES - GENERAL: PAINLEVEL: NO PAIN (0)

## 2021-12-10 NOTE — PROGRESS NOTES
Infusion Nursing Note:  Kelsea Murray presents today for Methotrexate.    Patient seen by provider today: No   present during visit today: Not Applicable.    Note: N/A.      Intravenous Access:  No Intravenous access/labs at this visit.    Treatment Conditions:  Not Applicable.      Post Infusion Assessment:  Patient tolerated injection without incident.  No evidence of extravasations.       Discharge Plan:   Discharge instructions reviewed with: Patient.  Patient and/or family verbalized understanding of discharge instructions and all questions answered.  Patient discharged in stable condition accompanied by: self.  Departure Mode: Ambulatory.      Gladis Macario RN

## 2021-12-10 NOTE — RESULT ENCOUNTER NOTE
Hi,  Your pregnancy hormone level decreased, which is good, but it did not decrease as much as I would have liked.  I recommend you repeat the methotrexate today.  I will start that process now.  Please let me know how you have been feeling - have you had pain or bleeding?  We will be in touch.  Thanks,  Dr. Ocasio

## 2021-12-10 NOTE — PROGRESS NOTES
Patient is feeling well and has no concerns.  All pregnancy symptoms are gone.  She has no pain or bleeding.  Quantitative hCG has decreased, but not 15% from day 4 to 7.  Recommend second dose of methotrexate 50 mg/m  IM.  This was ordered.  Patient understands the risks and benefits and agrees to proceed.  Plan repeat hCG next week.  The hCG was not resulted until late last night, so we will be a day off.

## 2021-12-11 ENCOUNTER — NURSE TRIAGE (OUTPATIENT)
Dept: NURSING | Facility: CLINIC | Age: 36
End: 2021-12-11
Payer: COMMERCIAL

## 2021-12-11 NOTE — TELEPHONE ENCOUNTER
"Triage Call:     Pt is being treated for ectopic pregnancy   Pt received Methotrexate on 12/2/2021, and 12/10/2021 because her numbers didn't drop    Heavy bleeding or pain = go back in  Started bleeding, and having what she considers mild period cramps   Some Clots; dime size  2 pads since this AM; not soaked,  Has not saturated an entire pad  Deep red with clots  When she wipes it is a couple times before toilet paper is without blood    Paperwork states to go to the ED if:  Lower belly pain that gets worse or doesn t go away  Heavy vaginal bleeding  Nausea or vomiting that needs treatment  Dizziness, weakness, or fainting  Fever of 100.4 F (38 C) or higher, or as directed by your provider    Bleeding is not considered \"heavy\" at this time.     Disposition: Home Care. Care advice given. Pt will continue to monitor the bleeding and other symptoms at this time.     Almaz Oglesby RN  [facility] Nurse Advisor 10:17 AM 12/11/2021    Reason for Disposition    MILD bleeding or SPOTTING after procedure (e.g., biopsy) or pelvic examination (e.g., pap smear) persisting < 4 days    Additional Information    Negative: SEVERE vaginal bleeding (e.g., continuous red blood from vagina, or large blood clots) and very weak (can't stand)    Negative: Passed out (i.e., fainted, collapsed and was not responding)    Negative: Difficult to awaken or acting confused (e.g., disoriented, slurred speech)    Negative: Shock suspected (e.g., cold/pale/clammy skin, too weak to stand, low BP, rapid pulse)    Negative: Sounds like a life-threatening emergency to the triager    Negative: Pregnant > 20 weeks (5 months or more)    Negative: Pregnant < 20 weeks (less than 5 months)    Negative: Postpartum (from 0 to 6 weeks after delivery)    Negative: Vaginal discharge is the main symptom and bleeding is slight    Negative: SEVERE abdominal pain (e.g., excruciating)    Negative: SEVERE dizziness (e.g., unable to stand, requires support to walk, " feels like passing out now)    Negative: SEVERE vaginal bleeding (e.g., soaking 2 pads or tampons per hour and present 2 or more hours; 1 menstrual cup every 2 hours)    Negative: MODERATE vaginal bleeding (e.g., soaking pad or tampon per hour and present > 6 hours; 1 menstrual cup every 6 hours)    Negative: Pale skin (pallor) of new onset or worsening    Negative: Constant abdominal pain lasting > 2 hours    Negative: Patient sounds very sick or weak to the triager    Negative: Taking Coumadin (warfarin) or other strong blood thinner, or known bleeding disorder (e.g., thrombocytopenia)    Negative: Skin bruises or nosebleed and not caused by an injury    Negative: Bleeding/spotting after procedure (e.g., biopsy) or pelvic examination (e.g., pap smear) that persists > 3 days    Negative: Patient wants to be seen    Negative: Passed tissue (e.g., gray-white)    Negative: Periods with > 6 soaked pads or tampons per day    Negative: Periods last > 7 days    Negative: Uses menstrual cups and more than 80 ml blood per menstrual period    Negative: Missed period has occurred 2 or more times in the last year and the cause is not known    Negative: Menstrual cycle < 21 days OR > 35 days, and occurs more than two cycles (2 months) this past year    Negative: Bleeding or spotting between regular periods occurs more than three cycles (3 months) this past year    Negative: Bleeding or spotting between regular periods occurs more than three cycles (3 months), and using birth control medicine (e.g., pills, patch, Depo-Provera, Implanon, vaginal ring, Mirena IUD)    Negative: Bleeding or spotting occurs after hysterectomy    Negative: Age > 39 years with irregular or excessive bleeding    Negative: Bleeding or spotting occurs after sex (Exception: first intercourse)    Negative: MILD bleeding or SPOTTING and could be pregnant (e.g., missed last period)    Negative: Normal menstrual flow    Negative: [1] Caller has NON-URGENT  question AND [2] triager unable to answer question    Negative: [1] Caller has URGENT question AND [2] triager unable to answer question    Negative: [1] Constant abdominal pain AND [2] present > 2 hours    Negative: Passed tissue (e.g., gray-white)    Negative: Pale skin (pallor) of new onset or worsening    Negative: Patient sounds very sick or weak to the triager    Negative: SEVERE abdominal pain    Negative: [1] SEVERE vaginal bleeding (e.g., soaking 2 pads per hour, large blood clots) AND [2] present 2 or more hours    Negative: [1] MODERATE vaginal bleeding (i.e., soaking 1 pad / hour; clots) AND [2] present > 6 hours    Negative: [1] Threatened miscarriage (threatened ) suspected AND [2] has not been examined by a HCP    Negative: [1] Abdomen pain is main symptom and [2] NO vaginal bleeding in past 24 hours    Negative: [1] Vaginal bleeding is main symptom and [2] more than 4 weeks since medical visit    Negative: Not pregnant or pregnancy status unknown    Negative: Shock suspected (e.g., cold/pale/clammy skin, too weak to stand, low BP, rapid pulse)    Negative: Difficult to awaken or acting confused (e.g., disoriented, slurred speech)    Negative: Passed out (i.e., lost consciousness, collapsed and was not responding)    Negative: Sounds like a life-threatening emergency to the triager    Protocols used: VAGINAL BLEEDING - [id number],  - THREATENED MISCARRIAGE FOLLOW-UP CALL-A-

## 2021-12-13 ENCOUNTER — MYC MEDICAL ADVICE (OUTPATIENT)
Dept: OBGYN | Facility: CLINIC | Age: 36
End: 2021-12-13
Payer: COMMERCIAL

## 2021-12-13 DIAGNOSIS — O00.101 RIGHT TUBAL PREGNANCY WITHOUT INTRAUTERINE PREGNANCY: Primary | ICD-10-CM

## 2021-12-13 NOTE — TELEPHONE ENCOUNTER
Please make sure patient has quant scheduled at [place] on Thursday morning this week.  The afternoon lab that was done this past week was not resulted until almost 9 pm and delayed care

## 2021-12-16 ENCOUNTER — LAB (OUTPATIENT)
Dept: LAB | Facility: CLINIC | Age: 36
End: 2021-12-16
Payer: COMMERCIAL

## 2021-12-16 DIAGNOSIS — O00.101 RIGHT TUBAL PREGNANCY WITHOUT INTRAUTERINE PREGNANCY: Primary | ICD-10-CM

## 2021-12-16 DIAGNOSIS — O00.101 RIGHT TUBAL PREGNANCY WITHOUT INTRAUTERINE PREGNANCY: ICD-10-CM

## 2021-12-16 LAB — B-HCG SERPL-ACNC: 237 IU/L (ref 0–5)

## 2021-12-16 PROCEDURE — 84702 CHORIONIC GONADOTROPIN TEST: CPT

## 2021-12-16 PROCEDURE — 36415 COLL VENOUS BLD VENIPUNCTURE: CPT

## 2021-12-16 NOTE — RESULT ENCOUNTER NOTE
Hi!    I just tried calling, but did not leave a message.  Your pregnancy hormone level is much better!  I am happy to see how low it is.  I hope you are feeling better as well.  I have ordered weekly pregnancy hormone levels.  Ideally these are done on Thursdays, but we can adjust it if the hormone level drops normally over the next couple of weeks.      Please let me know if you have any questions.    Thanks!  Dr. Ocasio

## 2021-12-27 ENCOUNTER — MYC MEDICAL ADVICE (OUTPATIENT)
Dept: OBGYN | Facility: CLINIC | Age: 36
End: 2021-12-27
Payer: COMMERCIAL

## 2021-12-27 NOTE — TELEPHONE ENCOUNTER
Pt recently had an ectopic pregnancy and feels she is dealing with post partum anxiety.    Routing to Dr. Ocasio to see if she would prefer pt to follow up with a family practice provider for her increased anxiety.    Cindy Fernandez RN

## 2021-12-28 NOTE — TELEPHONE ENCOUNTER
I am reviewing for the provider who is away. Please notify patient.   Recommend arranging follow-up FP visit for anxiety.     Israel Almodovar MD

## 2021-12-28 NOTE — TELEPHONE ENCOUNTER
Helen Quigley, DO  [facility] Triage 22 minutes ago (10:44 AM)     KK    Generally, we do not manage anxiety outside of pregnancy. Given that this has likely been on ongoing concern, now worsened with recent events, I would recommend that the patient make an appointment with her Primary care provider to discuss her concerns further and possible medication, counseling assistance.     If Dr. Ocasio feels comfortable with treatment, she may also respond when she returns to the office.       Thank you,   Helen Quigley, DO

## 2022-01-03 ENCOUNTER — MYC MEDICAL ADVICE (OUTPATIENT)
Dept: FAMILY MEDICINE | Facility: CLINIC | Age: 37
End: 2022-01-03
Payer: COMMERCIAL

## 2022-01-03 ENCOUNTER — E-VISIT (OUTPATIENT)
Dept: URGENT CARE | Facility: URGENT CARE | Age: 37
End: 2022-01-03
Payer: COMMERCIAL

## 2022-01-03 DIAGNOSIS — Z20.822 SUSPECTED COVID-19 VIRUS INFECTION: Primary | ICD-10-CM

## 2022-01-03 PROCEDURE — 99421 OL DIG E/M SVC 5-10 MIN: CPT | Performed by: FAMILY MEDICINE

## 2022-01-03 NOTE — TELEPHONE ENCOUNTER
See [employer] message, advised patient to schedule evisit.     Ashley Aldrich RN  [facility]

## 2022-01-03 NOTE — PATIENT INSTRUCTIONS
Kelsea,      Based on your responses, you may have coronavirus (COVID-19). This illness can cause fever, cough and trouble breathing. Many people get a mild case and get better on their own. Some people can get very sick.    Will I be tested for COVID-19?  We would like to test you for COVID-19 virus. I have placed orders for this test.     To schedule: go to your [facility] home page and scroll down to the section that says  You have an appointment that needs to be scheduled  and click the large green button that says  Schedule Now  and follow the steps to find the next available openings.    If you are unable to complete these [facility] scheduling steps, please call [phone] to schedule your testing.     Return to work/school/ guidance:  Please let your workplace manager and staffing office know when your isolation ends.       If you receive a positive COVID-19 test result, follow the guidance of the those who are giving you the results. Usually the return to work is 10 days from symptom onset or positive test date, (or in some cases 20 days if you are immunocompromised). If your symptoms started after your positive test, the 10 days should start when your symptoms started.   o If you work at [employer], you must also be cleared by Employee Occupational Health and Safety to return to work.      If you receive a negative COVID-19 test result and did not have a high risk exposure to someone with a known positive COVID-19 test, you can return to work once you're free of fever for 24 hours without fever-reducing medication and your symptoms are improving or resolved.    If you receive a negative COVID-19 test and had a high-risk exposure to someone who has tested positive for COVID-19 then you can return to work 14 days after your last contact with the positive individual. Follow quarantine guidance given by your doctor or public health officials.     Sign up for GetWell Loop:  We know it's scary to hear  that you might have COVID-19. We want to track your symptoms to make sure you're okay over the next 2 weeks. Please look for an email from [employer]--this is a free, online program that we'll use to keep in touch. To sign up, follow the link in the email you will receive. Learn more at [email or web address]    How can I take care of myself?  Over the counter medications may help with your symptoms like congestion, cough, chills, or fever.    There are not many effective prescription treatments for early COVID-19. Hydroxychloroquine, ivermectin, and azithromycin are not effective or recommended for COVID-19.    If your symptoms started in the last 10 days, you may be able to receive a treatment with monoclonal antibodies. This treatment can lower your risk of severe illness and going to the hospital. It is given through an IV or under your skin (subcutaneous) and must be given at an infusion center. You must be 12 or older, weight at least 88 pounds, and have a positive COVID-19 test.     If you would like to sign up to be considered to receive the monoclonal antibody medicine, please complete a participation form through the [facility] here: MNRAP ([email or web address]). You may also call the [facility] COVID-19 Public Hotline at 1-532.630.6159 (open Mon-Fri: 9am-7pm and Sat: 10am-6pm).     Not all people who are eligible will receive the medicine, since supply is limited. You will be contacted in the next 1 to 2 business days only if you are selected. If you do not receive a call, you have not been selected to receive the medicine. If you have any questions about this medication, please contact your primary care provider. For more information, see [email or web address]/[email or web address]      Get lots of rest. Drink extra fluids (unless a doctor has told you not to)    Take Tylenol (acetaminophen) or ibuprofen for fever or  pain. If you have liver or kidney problems, ask your family doctor if it's okay to take Tylenol o ibuprofen    Take over the counter medications for your symptoms, as directed by your doctor. You may also talk to your pharmacist.      If you have other health problems (like cancer, heart failure, an organ transplant or severe kidney disease): Call your specialty clinic if you don't feel better in the next 2 days.    Know when to call 911. Emergency warning signs include:  o Trouble breathing or shortness of breath  o Pain or pressure in the chest that doesn't go away  o Feeling confused like you haven't felt before, or not being able to wake up  o Bluish-colored lips or face    Where can I get more information?    [facility] - About COVID-19: [email or web address]     CDC - What to Do If You're Sick:     www.cdc.gov/coronavirus/2019-ncov/about/steps-when-sick.html    CDC - Ending Home Isolation:  https://www.cdc.gov/coronavirus/2019-ncov/your-health/quarantine-isolation.html    CDC - Caring for Someone:  www.cdc.gov/coronavirus/2019-ncov/if-you-are-sick/care-for-someone.html    [facility] clinical trials (COVID-19 research studies): clinicalaffairs.[facility]-clinical-trials    Below are the COVID-19 hotlines at the [place] Department of Health ([facility]). Interpreters are available.  o For health questions: Call 116-572-9195 or 1-447.728.2474 (7 a.m. to 7 p.m.)  o For questions about schools and childcare: Call 575-247-4868 or 1-329.958.6402 (7 a.m. to 7 p.m.)  January 3, 2022  RE:  Kelsea Murray                                                                                                                  [address] [address]      To whom it may concern:    I evaluated Kelsea Murray on January 3, 2022. Kelsea Murray should be excused from work/school.     They should let their workplace manager and staffing office know when their quarantine ends.    We can  not give an exact date as it depends on the information below. They can calculate this on their own or work with their manager/staffing office to calculate this. (For example if they were exposed on 10/04, they would have to quarantine for 14 full days. That would be through 10/18. They could return on 10/19.)    Quarantine Guidelines:    If patient receives a positive COVID-19 test result, they should follow the guidance of those who are giving the results. Usually the return to work is 10 (or in some cases 20 days from symptom onset.) If they work at [employer], they must be cleared by Employee Occupational Health and Safety to return to work.      If patient receives a negative COVID-19 test result and did not have a high risk exposure to someone with a known positive COVID-19 test, they can return to work once they're free of fever for 24 hours without fever-reducing medication and their symptoms are improving or resolved.    If patient receives a negative COVID-19 test and if they had a high risk exposure to someone who has tested positive for COVID-19 then they can return to work 14 days after their last contact with the positive individual    Note: If there is ongoing exposure to the covid positive person, this quarantine period may be longer than 14 days. (For example, if they are continually exposed to their child, who tested positive and cannot isolate from them, then the quarantine of 7-14 days can't start until their child is no longer contagious. This is typically 10 days from onset to the child's symptoms. So the total duration may be 17-24 days in this case.)     Sincerely,  Ronald Villela DO          o

## 2022-01-06 ENCOUNTER — TELEPHONE (OUTPATIENT)
Dept: OBGYN | Facility: OTHER | Age: 37
End: 2022-01-06
Payer: COMMERCIAL

## 2022-01-06 NOTE — TELEPHONE ENCOUNTER
Left message asking patient to return or call.  When she calls back, I would like to talk with her to see how she is feeling.  She was treated with methotrexate but did not follow-up with labs as ordered.  She has lab scheduled now for next week.  She has symptoms suggestive of Covid and is getting testing today.

## 2022-01-10 ENCOUNTER — MYC MEDICAL ADVICE (OUTPATIENT)
Dept: OBGYN | Facility: CLINIC | Age: 37
End: 2022-01-10
Payer: COMMERCIAL

## 2022-01-11 NOTE — TELEPHONE ENCOUNTER
Pt has read [employer] message, not yet responded.    Aline Otto RN on 1/11/2022 at 9:08 AM

## 2022-01-13 ENCOUNTER — VIRTUAL VISIT (OUTPATIENT)
Dept: FAMILY MEDICINE | Facility: CLINIC | Age: 37
End: 2022-01-13
Payer: COMMERCIAL

## 2022-01-13 DIAGNOSIS — F41.9 ANXIETY: Primary | ICD-10-CM

## 2022-01-13 PROCEDURE — 99213 OFFICE O/P EST LOW 20 MIN: CPT | Mod: 95 | Performed by: NURSE PRACTITIONER

## 2022-01-13 RX ORDER — SERTRALINE HYDROCHLORIDE 25 MG/1
25 TABLET, FILM COATED ORAL DAILY
Qty: 30 TABLET | Refills: 1 | Status: SHIPPED | OUTPATIENT
Start: 2022-01-13 | End: 2022-02-01 | Stop reason: DRUGHIGH

## 2022-01-13 NOTE — PROGRESS NOTES
"Kelsea is a 36 year old who is being evaluated via a billable video visit.      How would you like to obtain your AVS? MyChart  If the video visit is dropped, the invitation should be resent by: patient already online  Will anyone else be joining your video visit? No    Video Start Time: 3:17 pm    Assessment & Plan     Anxiety  Referral to [facility]. Start sertraline at low dose. Referral for therapy. Follow up 4 weeks.  - sertraline (ZOLOFT) 25 MG tablet; Take 1 tablet (25 mg) by mouth daily  - Adult Mental Health Referral; Future             BMI:   Estimated body mass index is 37.25 kg/m  as calculated from the following:    Height as of 12/2/21: 1.727 m (5' 8\").    Weight as of 12/10/21: 111.1 kg (245 lb).       See Patient Instructions    Return in about 4 weeks (around 2/10/2022).     The benefits, risks and potential side effects were discussed in detail. Black box warnings discussed as relevant. All patient questions were answered. The patient was instructed to follow up immediately if any adverse reactions develop.    Return precautions discussed, including when to seek urgent/emergent care.    Patient verbalizes understanding and agrees with plan of care. Patient stable for discharge.      WAN Canales CNP  [facility]    Referral sent to our [facility] and mental health therapy.    Subjective   Kelsea is a 36 year old who presents for the following health issues     HPI       Pleasant 36 year old female initiated a virtual visit to discuss anxiety. She has had some element of anxiety for a while now but has become much worse since having her son 18 months ago and suffering a second ectopic pregnancy in December 2021. First one was 2018. They have to wait 6 months to try again because she had to have 2 doses of methotrexate. States her anxiety is irrational and often life and death situations. For example, her  forgot his phone at home the other day and all she could think about " was that he got in a car accident and  until she was able to reach him a few hours later. First noticed anxiety in her life when she had a panic attack in her 20s. Since then she's always had a level of anxiety. She was previously prescribed zoloft but never took it. She becomes consumed with anxious thoughts and can't be productive the rest of the day. Reports intrusive thoughts. No recent panic attacks. Extreme level of daily anxious. Tried changing lifestyle - more exercise, etc. Not pregnant or breastfeeding. Denies symptoms of depression. No thoughts to harm self. BETTY 15 today.    Review of Systems   Constitutional, HEENT, cardiovascular, pulmonary, gi and gu systems are negative, except as otherwise noted.      Objective           Vitals:  No vitals were obtained today due to virtual visit.    Physical Exam   GENERAL: Healthy, alert and no distress  EYES: Eyes grossly normal to inspection.  No discharge or erythema, or obvious scleral/conjunctival abnormalities.  RESP: No audible wheeze, cough, or visible cyanosis.  No visible retractions or increased work of breathing.    SKIN: Visible skin clear. No significant rash, abnormal pigmentation or lesions.  NEURO: Cranial nerves grossly intact.  Mentation and speech appropriate for age.  PSYCH: Mentation appears normal, affect normal/bright, judgement and insight intact, normal speech and appearance well-groomed.                Video-Visit Details    Type of service:  Video Visit    Video End Time:3:30 pm    Originating Location (pt. Location): Home    Distant Location (provider location):  [facility]     Platform used for Video Visit: Airbrite

## 2022-01-13 NOTE — Clinical Note
Kelsea Jamison would love to talk with you until she can establish with a therapist. I will finish my note this evening.  Thank you,  WAN Canales CNP

## 2022-01-14 NOTE — TELEPHONE ENCOUNTER
Patient continues to reschedule her lab appointment. Left message on her voicemail stressing the importance of this lab.

## 2022-01-17 ENCOUNTER — LAB (OUTPATIENT)
Dept: LAB | Facility: CLINIC | Age: 37
End: 2022-01-17
Payer: COMMERCIAL

## 2022-01-17 DIAGNOSIS — O00.101 RIGHT TUBAL PREGNANCY WITHOUT INTRAUTERINE PREGNANCY: ICD-10-CM

## 2022-01-17 LAB — B-HCG SERPL-ACNC: <1 IU/L (ref 0–5)

## 2022-01-17 PROCEDURE — 36415 COLL VENOUS BLD VENIPUNCTURE: CPT

## 2022-01-17 PROCEDURE — 84702 CHORIONIC GONADOTROPIN TEST: CPT

## 2022-01-18 ENCOUNTER — TELEPHONE (OUTPATIENT)
Dept: BEHAVIORAL HEALTH | Facility: CLINIC | Age: 37
End: 2022-01-18
Payer: COMMERCIAL

## 2022-01-18 NOTE — TELEPHONE ENCOUNTER
Reached out to pt to offer [facility] appt per the request of WAN Rosa, CNP Left voicemail with behavioral intakes number for scheduling.

## 2022-01-29 ENCOUNTER — NURSE TRIAGE (OUTPATIENT)
Dept: NURSING | Facility: CLINIC | Age: 37
End: 2022-01-29
Payer: COMMERCIAL

## 2022-01-29 NOTE — TELEPHONE ENCOUNTER
"Triage Call:    Patient called to report stepping on a \"super old and karie thumb tack\" with her heel today.  Her last tetanus booster was 04/17/2020.  She reports that the puncture was not deep as the tack was still on the ground when she removed her foot.  She reports \"very little bleeding\".  Patient denies severe pain or inability to walk on affected foot.    According to the protocol, patient should See HCP within 4 hours (or PCP Triage).  Page on-call provider, Dr Grimm, at 1714.      MD Recommendation:  Due to puncture not being deep, patient should soak affected foot a couple times tonight for 15-20 minutes and apply antibiotic ointment.  If the patient notices swelling or redness, she should be seen.      Called patient to inform her of provider's recommendations and to provide care advice. Patient verbalizes understanding and agrees with plan of care.     Ashley Beckman RN  01/29/22 5:26 PM  [facility] Nurse Advisor    COVID 19 Nurse Triage Plan/Patient Instructions    Please be aware that novel coronavirus (COVID-19) may be circulating in the community. If you develop symptoms such as fever, cough, or SOB or if you have concerns about the presence of another infection including coronavirus (COVID-19), please contact your health care provider or visit [email or web address].     Disposition/Instructions    Home care recommended. Follow home care protocol based instructions.    Thank you for taking steps to prevent the spread of this virus.  o Limit your contact with others.  o Wear a simple mask to cover your cough.  o Wash your hands well and often.    Resources    [facility]: About COVID-19: [email or web address]    CDC: What to Do If You're Sick: www.cdc.gov/coronavirus/2019-ncov/about/steps-when-sick.html    CDC: Ending Home Isolation: www.cdc.gov/coronavirus/2019-ncov/hcp/disposition-in-home-patients.html     CDC: Caring for Someone: " www.cdc.gov/coronavirus/2019-ncov/if-you-are-sick/care-for-someone.html     [facility]: Interim Guidance for Hospital Discharge to Home: www.health.[facility].[place]/diseases/coronavirus/hcp/hospdischarge.pdf    [facility] clinical trials (COVID-19 research studies): clinicalaffairs.[facility]/umn-clinical-trials     Below are the COVID-19 hotlines at the [place] Department of Health ([facility]). Interpreters are available.   o For health questions: Call 393-222-2521 or 1-700.276.2733 (7 a.m. to 7 p.m.)  o For questions about schools and childcare: Call 067-652-6250 or 1-405.463.2156 (7 a.m. to 7 p.m.)     Reason for Disposition    Puncture wound from a sharp object that was very dirty    Additional Information    Negative: [1] Puncture wound of head, neck, chest, back, or abdomen AND [2] sounds life-threatening to the triager    Negative: Shock suspected (e.g., cold/pale/clammy skin, too weak to stand, low BP, rapid pulse)    Negative: Sounds like a life-threatening emergency to the triager    Negative: [1] Caused by a needlestick or other sharp object AND [2] possible exposure to another person's body fluids    Negative: Caused by an animal bite    Negative: Caused by a human bite    Negative: Caused by a marine animal sting or bite    Negative: Skin is cut or scraped, not punctured    Negative: Puncture wound of eye or eyelid    Negative: Foreign body is still in the skin (e.g., splinter, sliver, fishhook)    Negative: [1] Puncture wound of head, neck, chest, abdomen, or overlying a joint AND [2] could be deep    Negative: High pressure injection injury (e.g., from grease gun or paint gun, usually work-related)    Negative: Sounds like a serious injury to the triager    Negative: [1] SEVERE pain AND [2] not improved 2 hours after pain medicine    Negative: [1] Puncture wound of foot AND [2] hurts too much to walk on it (i.e., unable to bear weight, severe limp)    Negative: [1] Puncture wound of bare foot (no shoes) AND  [2] setting was dirty  (Exception: shallow puncture)    Negative: [1] Puncture wound of foot AND [2] puncture went through shoe (e.g., tennis shoe)    Negative: [1] Puncture wound of finger AND [2] entire finger swollen    Protocols used: PUNCTURE WOUND-A-AH

## 2022-02-15 ENCOUNTER — TELEPHONE (OUTPATIENT)
Dept: BEHAVIORAL HEALTH | Facility: CLINIC | Age: 37
End: 2022-02-15
Payer: COMMERCIAL

## 2022-03-21 DIAGNOSIS — Z87.19 HX OF PANCREATITIS: ICD-10-CM

## 2022-03-21 DIAGNOSIS — R10.11 RIGHT UPPER QUADRANT PAIN: Primary | ICD-10-CM

## 2022-04-19 ENCOUNTER — E-VISIT (OUTPATIENT)
Dept: URGENT CARE | Facility: CLINIC | Age: 37
End: 2022-04-19
Payer: COMMERCIAL

## 2022-04-19 DIAGNOSIS — Z20.822 SUSPECTED COVID-19 VIRUS INFECTION: Primary | ICD-10-CM

## 2022-04-19 PROCEDURE — 99421 OL DIG E/M SVC 5-10 MIN: CPT | Performed by: PHYSICIAN ASSISTANT

## 2022-04-20 NOTE — PATIENT INSTRUCTIONS
Kelsea,    Your symptoms show that you may have coronavirus (COVID-19). This illness can cause fever, cough and trouble breathing. Many people get a mild case and get better on their own. Some people can get very sick.    Because you reported additional symptoms, I would like to also test you for flu and strep.    What should I do?  I have placed orders for these tests.   To schedule: go to your Pushkart home page and scroll down to the section that says  You have an appointment that needs to be scheduled  and click the large green button that says  Schedule Now  and follow the steps to find the next available openings.     If you are unable to complete these Pushkart scheduling steps, please call [phone] to schedule your testing.     These guidelines are for isolating before returning to work, school or .     For employers, schools and day cares: This is an official notice for this person s medical guidelines for returning in-person.     For health care sites: The CDC gives different isolation and quarantine guidelines for healthcare sites, please check with these sites before arriving.     How do I self-isolate?  You isolate when you have symptoms of COVID or a test shows you have COVID, even if you don t have symptoms.     If you DO have symptoms:  o Stay home and away from others  - For at least 5 days after your symptoms started, AND   - You are fever free for 24 hours (with no medicine that reduces fever), AND  - Your other symptoms are better.  o Wear a mask for 10 full days any time you are around others.    If you DON T have symptoms:  o Stay at home and away from others for at least 5 days after your positive test.  o Wear a mask for 10 full days any time you are around others.    How can I take care of myself?  Over the counter medications may help with your symptoms such as runny or stuffy nose, cough, chills, or fever. Talk to your care team about your options.     Some people are at high  risk of severe illness (for example, you have a weak immune system, you re 65 years or older, or you have certain medical problems). If your risk is high and your symptoms started in the last 5 to 7 days, we strongly recommend for you to get COVID treatment as soon as possible. Paxlovid, Molnupiravir and the monoclonal antibody treatments are proven safe and effective, make you feel better faster, and prevent hospitalization and death.       To schedule an appointment to discuss COVID treatment, request an appointment on edjing (select  COVID-19 Treatment ) or call [facility] ([phone]), press 7.      Get lots of rest. Drink extra fluids (unless a doctor has told you not to)    Take Tylenol (acetaminophen) or ibuprofen for fever or pain. If you have liver or kidney problems, ask your family doctor if it's okay to take Tylenol or ibuprofen    Take over the counter medications for your symptoms, as directed by your doctor. You may also talk to your pharmacist.      If you have other health problems (like cancer, heart failure, an organ transplant or severe kidney disease): Call your specialty clinic if you don't feel better in the next 2 days.    Know when to call 911. Emergency warning signs include:  o Trouble breathing or shortness of breath  o Pain or pressure in the chest that doesn't go away  o Feeling confused like you haven't felt before, or not being able to wake up  o Bluish-colored lips or face    Where can I get more information?    [facility] - About COVID-19: [email or web address]     CDC - What to Do If You're Sick: https://www.cdc.gov/coronavirus/2019-ncov/if-you-are-sick/index.html     CDC - Quarantine & Isolation: https://www.cdc.gov/coronavirus/2019-ncov/your-health/quarantine-isolation.html     [facility] clinical trials (COVID-19 research studies): clinicalaffairs.[facility]/umn-clinical-trials    Below are the COVID-19 hotlines at the [facility]  [facility] ([facility]). Interpreters are available.  o For health questions: Call 214-394-7559 or 1-110.898.4937 (7 a.m. to 7 p.m.)  o For questions about schools and childcare: Call 373-357-1923 or 1-767.367.8763 (7 a.m. to 7 p.m.)  April 19, 2022  RE:  Kelsea Murray                                                                                                                  [address] [address]      To whom it may concern:    I evaluated Kelsea Murray on April 19, 2022. Kelsea Murray should be excused from work/school.     They should let their workplace manager and staffing office know when their quarantine ends.    We can not give an exact date as it depends on the information below. They can calculate this on their own or work with their manager/staffing office to calculate this. (For example if they were exposed on 10/04, they would have to quarantine for 14 full days. That would be through 10/18. They could return on 10/19.)    Quarantine Guidelines:    If patient receives a positive COVID-19 test result, they should follow the guidance of those who are giving the results. Usually the return to work is 10 (or in some cases 20 days from symptom onset.) If they work at [employer], they must be cleared by Employee Occupational Health and Safety to return to work.      If patient receives a negative COVID-19 test result and did not have a high risk exposure to someone with a known positive COVID-19 test, they can return to work once they're free of fever for 24 hours without fever-reducing medication and their symptoms are improving or resolved.    If patient receives a negative COVID-19 test and if they had a high risk exposure to someone who has tested positive for COVID-19 then they can return to work 14 days after their last contact with the positive individual    Note: If there is ongoing exposure to the covid positive person, this quarantine period may be longer than 14 days. (For  example, if they are continually exposed to their child, who tested positive and cannot isolate from them, then the quarantine of 7-14 days can't start until their child is no longer contagious. This is typically 10 days from onset to the child's symptoms. So the total duration may be 17-24 days in this case.)     Sincerely,  Han Gustafson PA-C          o

## 2022-05-05 ENCOUNTER — MYC MEDICAL ADVICE (OUTPATIENT)
Dept: FAMILY MEDICINE | Facility: CLINIC | Age: 37
End: 2022-05-05
Payer: COMMERCIAL

## 2022-05-05 NOTE — TELEPHONE ENCOUNTER
See [employer] message below.     Routing to provider to review and advise.    Ursula Patricia RN  [facility]

## 2022-05-06 NOTE — TELEPHONE ENCOUNTER
Routing to provider to review and advise. Please see "[employer] messages below.     Diana Munoz RN, BSN  [facility]

## 2022-05-12 ENCOUNTER — MYC MEDICAL ADVICE (OUTPATIENT)
Dept: FAMILY MEDICINE | Facility: CLINIC | Age: 37
End: 2022-05-12
Payer: COMMERCIAL

## 2022-05-12 NOTE — TELEPHONE ENCOUNTER
To provider to review and advise, place order for diagnostic mammo if indicated.  Ashley Aldrich RN  [facility]

## 2022-05-13 NOTE — TELEPHONE ENCOUNTER
Needs to schedule an in person visit with any available provider for evaluation of breast pain symptoms. Will need a breast exam and type of imaging to be determined then.  Thank you,  Skylar Coles MD MPH

## 2022-05-21 ENCOUNTER — HEALTH MAINTENANCE LETTER (OUTPATIENT)
Age: 37
End: 2022-05-21

## 2022-06-02 DIAGNOSIS — F41.9 ANXIETY: ICD-10-CM

## 2022-06-21 ENCOUNTER — VIRTUAL VISIT (OUTPATIENT)
Dept: FAMILY MEDICINE | Facility: CLINIC | Age: 37
End: 2022-06-21
Payer: COMMERCIAL

## 2022-06-21 DIAGNOSIS — R20.2 ARM PARESTHESIA, LEFT: Primary | ICD-10-CM

## 2022-06-21 DIAGNOSIS — N64.4 BREAST PAIN: ICD-10-CM

## 2022-06-21 PROCEDURE — 99213 OFFICE O/P EST LOW 20 MIN: CPT | Mod: 95 | Performed by: FAMILY MEDICINE

## 2022-06-21 NOTE — PROGRESS NOTES
Kelsea is a 36 year old who is being evaluated via a billable video visit.      How would you like to obtain your AVS? MyChart  If the video visit is dropped, the invitation should be resent by: Text to cell phone: [phone]  Will anyone else be joining your video visit? No        Assessment & Plan     Arm paresthesia, left  - EMG; Future    Breast pain  - MA Diagnostic Digital Bilateral; Future        No follow-ups on file.    Jordi Arriaza MD  [facility]EDITH Enamorado is a 36 year old accompanied by her Self., presenting for the following health issues:  Breast Mass and Numbness      HPI       Concern - Paraesthesia of left arm radiating to left axilla  Onset: Noticed about 5 years ago  Description: Lump in left breast for about 5 years. Had diagnostic mammogram done and results came back 'fine'. Now making arm feel numb and heavier and feels like it is going into left breast/chest area. Lump has not grown in size.   Intensity: mild, moderate  Progression of Symptoms:  same and constant  Accompanying Signs & Symptoms: numbness  Previous history of similar problem: Yes  Therapies tried and outcome:  none         Review of Systems   Constitutional, HEENT, cardiovascular, pulmonary, GI, , musculoskeletal, neuro, skin, endocrine and psych systems are negative, except as otherwise noted.      Objective           Vitals:  No vitals were obtained today due to virtual visit.    Physical Exam   GENERAL: Healthy, alert and no distress  EYES: Eyes grossly normal to inspection.  No discharge or erythema, or obvious scleral/conjunctival abnormalities.  RESP: No audible wheeze, cough, or visible cyanosis.  No visible retractions or increased work of breathing.    SKIN: Visible skin clear. No significant rash, abnormal pigmentation or lesions.  NEURO: Cranial nerves grossly intact.  Mentation and speech appropriate for age.  PSYCH: Mentation appears normal, affect normal/bright, judgement  and insight intact, normal speech and appearance well-groomed.          Video-Visit Details    Video Start Time: 1:00PM    Type of service:  Video Visit    Video End Time:11:38 PM    Originating Location (pt. Location): Home    Distant Location (provider location):  [facility]     Platform used for Video Visit: Arara    Alphonse Cunha.

## 2022-07-04 NOTE — PATIENT INSTRUCTIONS
At [facility], we strive to deliver an exceptional experience to you, every time we see you.  If you receive a survey in the mail, please send us back your thoughts. We really do value your feedback.    Based on your medical history, these are the current health maintenance/preventive care services that you are due for (some may have been done at this visit.)  Health Maintenance Due   Topic Date Due     INFLUENZA VACCINE (SYSTEM ASSIGNED)  09/01/2017         Suggested websites for health information:  Www.[employer]Genius.org : Up to date and easily searchable information on multiple topics.  Www.medlineplus.gov : medication info, interactive tutorials, watch real surgeries online  Www.familydoctor.org : good info from the Academy of Family Physicians  Www.cdc.gov : public health info, travel advisories, epidemics (H1N1)  Www.aap.org : children's health info, normal development, vaccinations  Www.health.[facility].[place].us : [place] dept of health, public health issues in [place], N1N1    Your care team:                            Family Medicine Internal Medicine   MD Milton White MD Shantel Branch-Fleming, MD Katya Georgiev PA-C Megan Hill, APRN CNP    Pascual Ferreira MD Pediatrics   Wei Adams, PAYasmineC  Geneva Horne, CNP Isabella East APRN CNP   MD Marie Shah MD Deborah Mielke, MD Kim Thein, APRN CNP      Clinic hours: Monday - Thursday 7 am-7 pm; Fridays 7 am-5 pm.   Urgent care: Monday - Friday 11 am-9 pm; Saturday and Sunday 9 am-5 pm.  Pharmacy : Monday -Thursday 8 am-8 pm; Friday 8 am-6 pm; Saturday and Sunday 9 am-5 pm.     Clinic: [phone]   Pharmacy: [phone]      
Average build

## 2022-09-02 NOTE — TELEPHONE ENCOUNTER
Advanced Endoscopy     Referring provider:  Dr. Be Crespo    Referred to: [facility]     Provider Requested: na     Referral Received: 6/13/19     Records received: in [facility]     Images received: in EPIC    Evaluation for: Pancreatitis     Clinical History (per RN review):     Patients 1st bout of pancreatitis was on 4/12/19- 1 day admission- recent history of morbid obesity, ETOH use and keto diet. MRCP showing concern for acute and chronic pancreatitis, requesting GI referral to manage condition:    [facility] Admitted: 4/12/2019-4/13/19    DISCHARGE DIAGNOSES   1. Acute pancreatitis, alcohol induced versus idiopathic  2. Morbid obesity    HOSPITAL COURSE   This is a 33 y.o. female with no significant PMHX present w/ abdominal pain for 1 day. Lipase was mildly elevated to 357, improved to 271. CT of abdomen pelvis showed interstitial edematous pancreatitis. Gallbladder ultrasound negative for gallstones. The patient typically drinks alcohol 2-3 times a week, commonly 4 drinks per episode. Triglycerides were normal. At this point, the etiology of her pancreatitis may be alcohol induced versus idiopathic. She was aggressively hydrated with lactated ringers. Patient's diet was advanced which she tolerated well. Her abdominal pain has improved. Recommend low-fat diet, abstain from alcohol, and follow-up with her PCP in 1 week.    Seen in PCP to follow up on 5/6 and 5/23- MRI ordered at visit on 5/6, told to follow up after imaging, hence appt on 5/23  Additionally from clinic notes:  She was tested with an abdominal sonogram which ruled out gallstones.  Her triglyceride levels were found to be normal as well as her calcium levels.  She admits that she drinks alcohol 2-3 times a week socially but has stopped all alcohol since her bout of pancreatitis.  We checked her IgG4 level and autoimmune markers to rule out IgG4 mediated or autoimmune pancreatitis and these were  normal.  We also further evaluated with an MRCP which revealed concerns of mild chronic pancreatitis along with acute inflammatory changes.     *also mentions that she was following the keto diet (low carb, high fat) prior to her bout of pancreatitis.    * notes from 5/23 state: Recommended to have repeat ERCP in 3 months from initial MRCP (August 2019) to ensure healing and improvement of inflammatory changes. As there is concern for chronic pancreatitis, we should thereafter consider an EUS. Nadine Navarro PA-C      IMAGING  MRI/MRCP 5/9/19- In [facility]  IMPRESSION:   1a. MRI findings concerning for mild changes of chronic pancreatitis  with associated mild residual acute inflammatory changes related to  recent bout of pancreatitis.   1b. Ill-defined 8 mm focus of mildly increased T2 signal and  associated mild restricted diffusion in the uncinate process,  nonspecific. Favored to represent mild residual inflammatory changes  related to recent bout of pancreatitis. Short interval follow-up of 3  months recommended.   2. No pancreas divisum. No pancreatic ductal dilatation. No  cholelithiasis or biliary dilatation.  3. Small nonspecific enhancing focus of the left T11 vertebral body,  attention on follow-up.    EXAM: CT ABDOMEN AND PELVIS WITH CONTRAST, 4/12/2019-      IMPRESSION:     1.  Interstitial edematous pancreatitis.    2.  Colonic diverticula without diverticulitis.        EXAM: UPPER ABDOMINAL ULTRASOUND, 4/12/2019    FINDINGS: The liver has normal contours and echotexture.  No intrahepatic bile duct dilation or focal mass.    No gallstones or sludge.  Normal wall thickness.  No pericholecystic fluid. The common bile duct diameter is normal, 3 mm.    Pancreas largely obscured by bowel gas. The spleen and both kidneys are normal.    Patent and appropriate directional flow in the main portal vein.  Normal IVC. Normal abdominal aorta.  No free fluid in the upper abdomen.      LABS  6/2/19  Liver labs  WNL  Pancreatic elastase >500  All other labs WNL,       4/12/19 4/13/19  Lipase  357  271    Current Outpatient Medications   Medication     Acetaminophen (TYLENOL PO)     cyclobenzaprine (FLEXERIL) 5 MG tablet     progesterone (PROMETRIUM) 200 MG capsule     No current facility-administered medications for this visit.        MD review date: 6/14/19  MD Decision for clinic consultation/Orders: 6/18/19 Per : Please schedule clinic follow up in 4-6 weeks after attack. Ct scan with IV contrast prior to clinic visit       Referral updates/Patient contacted: left message to call clinic for update, talked for 23 minutes about her results, symptoms, scans and plans for next attack.      Time-based billing (NON-critical care) Time-based billing (NON-critical care)

## 2022-09-17 ENCOUNTER — HEALTH MAINTENANCE LETTER (OUTPATIENT)
Age: 37
End: 2022-09-17

## 2022-10-11 ENCOUNTER — MYC MEDICAL ADVICE (OUTPATIENT)
Dept: FAMILY MEDICINE | Facility: CLINIC | Age: 37
End: 2022-10-11

## 2022-10-12 NOTE — TELEPHONE ENCOUNTER
RN contacted preferred pharmacy and discussed patients medication. Pharmacy explains that they just need patients insurance information. RN left patient a VM regarding pharmacies message.     Jennifer Montero RN, BSN   [facility]

## 2022-11-27 NOTE — TELEPHONE ENCOUNTER
Internal Medicine Progress Note                                                                  Patient Name: Davie Navas    YOB: 1946    MRN: [id number]         Date of Service: 11/23/2022    Subjective   Patient seen and examined at bedside. Patient with no specific complaints today. Patient has not ambulated. PT / OT to evaluate. Patient states he would like to go home, but this writer anticipates that the patient will need 24 Hour assistance and patient does not have access to this. Patient will to under go MRI. Nursing staff states they will reach out again for scheduling and understands to give morphine prior to imaging. Patient will to speak to psyche. Patient again today stating that he has nothing to look forward too. Expressed specific life stressors as well. Concern for active reoccurring severe MDD.     Will reach out to ortho about specific day for surgery.       Review of Systems:   Constitutional: Denies fever, chills, sweats, endorses depressed mood  HEENT: Denies recent vision changes  Cardiovascular: Denies CP, palpitations, peripheral edema, syncope   Respiratory: Denies SOB, wheezing, cough, hemoptysis   GI: Denies N/V/D/C, abdominal pain  : Denies dysuria, hematuria   MSK: Denies muscle pain, endorses left shoulder pain when palpated   Skin: Denies rash, pruritus   Heme/lymph: Denies LAD   Neuro: Denies numbness, tingling, weakness, headache     All other systems are negative.    Objective     Vitals:    11/27/22 0433   BP: 116/69   Pulse: 65   Resp: 16   Temp: 98.4 °F (36.9 °C)          Physical Exam  General: NAD, appears stated age, laying in bed  HEENT: normocephalic, atraumatic  CV: RRR. + pulses in left radial, tenderness to palpation of left clavicle and superior anterior left chest wall with some noted protuberance   Lungs: CTAB  Abdomen: soft, nontender, nondistended, BS present, ileal ostomy noted, no erythremia   Extremities: no peripheral edema  Neuro: Left arm  Will need to be seen in the clinic to determine what is the underlying cause of this symptoms.   no neurovascular deficits, cap refill normal, well perfused, left arm in sling, nail changes and discoloration   Skin: warm, dry, intact, seborrheic dermatitis noted       Intake/Output Summary (Last 24 hours) at 11/27/2022 0839  Last data filed at 11/27/2022 0200  Gross per 24 hour   Intake --   Output 850 ml   Net -850 ml        Assessment and Plan   Davie Navas is a 76 year old male with a PMH of atrial fibrillation and previous DVT on anticoagulation, COPD on home O2, CVA in August 2021 with intra-arterial shunt, and  pT4 bladder cancer who is s/p robotic-assisted laparoscopic radical cystoprostatectomy, pelvic lymphadenectomy, and ileal conduit urinary diversion on 7/16/2021, that presented to [facility] on 11/23 d/t left shoulder pain following a fall.      #Mechanical Fall   #Left Humerus fracture  #Concern for osteomyelitis, left clavicular head   #Leukocytosis, likely 2/2 above  - Pt with over 3 month history of pain to left shoulder after a injury while gardening  - This Wednesday pt then experienced a mechanical fall, resulting in acute shoulder pain in addition to the chronic left shoulder pain he had been experiencing for the last 3 months  - The osteomyelitis is most concerning for post-traumatic following left shoulder injury  - Concern for malignancy   Pertinent Labs/Imaging  - WBC: stable  - XR elbow/humerus/shoulder: Acute/subacute fracture within the distal diaphysis of the left humerus with mild sclerosis and periosteal reaction along the periphery of the fracture site.   - CT thoracic spine:  Mixed lytic and sclerotic changes within the left clavicular head with associated pathologic fracture extending to the sternoclavicular joint with mild associated soft tissue thickening. Findings are concerning for osteomyelitis with phlegmonous changes in the anterior mediastinum  Plan  - Orthopedic surgery: Will reach out about specific OR date   -  NM bone scan 11/26: No official read yet   -  Pending MRI of  left arm: Please give morphine prior to imaging  - Thoracic surgery consulted, appreciate recs  - Ibuprofen , Norco 5-325 mg Q 4 H PRN, moderate pain,  Morphine 2 mg 3 H PRN, severe pain  -PT/OT eval, potential MELLISSA needed upon discharge   - ambulate as tolerated      #Hx of stroke   #Concern of Dysphagia  - Patient presented with dysarthria on 8/10/2021 and for neurology patient has right hemiparesis consistent with acute stroke in the left hemisphere.  - 8/21:  EF of 50% with intra-atrial shunt although is not very clear patient has a secundum ASD, sinus versus ASD, large PFO or even a membranous VSD  - Reports has difficulty swallowing thick food, such as meat  Plan  - Speech eval: Dysphagia diet       - Continue home atorvastatin 80 mg     #COPD on home O2  -Patient with history of COPD on home O2  -Patient in no acute respiratory distress no signs of increased work of breathing  -Saturating 94% on 4 L of nasal cannula  Plan  - Continue supplemental O2 with goal of saturation between 88- 92%  - Continue home Trelegy  -Continue home albuterol     #Anxiety  #Major depressive disorder, recurrent, moderate / severe   - Pt with of anxiety and depression  - States he has had SI and HI in the past, but denies current SI and HI  - PTA meds: Lyrica 300 BID, Mirtazapine 30 mg nightly, Xanax .5 QID PRN, Klonopin 1 mg nightly PRN sleep   Plan  - Continue home Lyrica 300 BID  - Last dose of Mirtazapine unknown time, encourage pt to establish care outpatient to resume medication  - Restarting patients home xanax .5TID, will hold klonopin  - Psychology consulted, appreciate recs   - Vortioxetine 20mg daily  - Continue to monitor SI or HI for low threshold for 1:1 sitter       #Hx of A.fib   #Hx of DVT  -  Hx of A. Fib and DVT of right lower leg on home anticoagulation  - [id number]: 4  - HAS-BLED score: 2  Plan  - Xarellto held  - Heparin ggt    #HLD   - Continue home atorvastatin 80 mg     #Elevated Alkaline phoshatase  -  11/24: Alk phos- 229 (high) - also elevated in august of 2022 - Malignancy is of concern   - increase in Isolated elevated alk phos likely 2/2 to bone in setting of fracture   Plan  - CTM, malignancy work up pending: Bone scan and MRI      #Hx prostate / Bladder transitional cell  - Resection of bladder and prostate, Per patient, no chemo or radiation   - Ileal conduit   - concern for spread given above CT findings  - MRI and bone scan pending      # Preop Cardiac Risk Assessment  - This is a 76 year old male undergoing preoperative cardiac evaluation for an elective non-emergent non-cardiac surgery  - Pt denies CP at rest or with exertion, dyspnea at rest or with exertion, PND, LE edema, claudication  - Pt does  have a history of CKD.  Has a hx of recent anticoagulation, Xarellto   - Pt only able to achieve of METS 1, but this is due to difficulty with ambulation most likely not cardiovascular issues.  - Sinus rhythm, but known history of atrial fibrillation  - RCRI score of 1= 6 % risk for major cardiovascular events (cardiac death, nonfatal MI, nonfatal cardiac arrest).    - Patient is at low risk for Moderate cardiac risk surgery per RCRI is at 6 % for major cardiovascular events  - Patient may proceed with surgery without any further invasive diagnostic or therapeutic intervention     Davie Navas understands that there are risks inherent in any procedure and that the risks must be weighed against the benefits. Discussed that he should review surgery specifics, what to expect for recovery and alternatives with the operating physician.     FENA  Fluids: N/a  Electrolytes: replace PRN  Nutrition: Dysphagia diet  Activity: advance to baseline     PPx:  VTE: SCDs  GI: not indicated     Code Status: FULL CODE     Dispo: General Medical Floors    Case to be discussed with Dr. Arpita Linda MD  Internal Medicine Resident

## 2023-01-19 ENCOUNTER — MEDICAL CORRESPONDENCE (OUTPATIENT)
Dept: HEALTH INFORMATION MANAGEMENT | Facility: CLINIC | Age: 38
End: 2023-01-19

## 2023-01-23 ENCOUNTER — MYC REFILL (OUTPATIENT)
Dept: FAMILY MEDICINE | Facility: CLINIC | Age: 38
End: 2023-01-23
Payer: COMMERCIAL

## 2023-01-23 DIAGNOSIS — F41.9 ANXIETY: ICD-10-CM

## 2023-01-23 NOTE — TELEPHONE ENCOUNTER
Called patient and relayed need for appt, but med was refilled for 3 months in the meantime. Patient verbalized understanding, patient states she can schedule her yearly physical. Patient was on hold to be warm transferred to scheduling when patient hung up. Will send over Ci[employer]A message with clinic number to call and schedule appt.       BERNADETTE FrancisN, RN  [facility]

## 2023-03-16 ENCOUNTER — MYC MEDICAL ADVICE (OUTPATIENT)
Dept: OBGYN | Facility: CLINIC | Age: 38
End: 2023-03-16
Payer: COMMERCIAL

## 2023-03-16 NOTE — TELEPHONE ENCOUNTER
Pt has not seen Dr. Ocasio since 21.    .  However, pt states she has a history of 2 ectopic pregnancies.    Pt is writing in asking if Dr. Ocasio thought she would be a good candidate for IVF since she has had 2 ectopic pregnancies and only one successful pregnancy.    Routing to Dr. Ocasio: should pt schedule an appt with Dr. Ocasio to discuss further or would you suggest pt be seen at an [facility] clinic.    Cindy Fernandez RN

## 2023-04-13 ENCOUNTER — LAB (OUTPATIENT)
Dept: LAB | Facility: CLINIC | Age: 38
End: 2023-04-13
Payer: COMMERCIAL

## 2023-04-13 DIAGNOSIS — O09.11 PREGNANCY IN FIRST TRIMESTER WITH HISTORY OF ECTOPIC PREGNANCY: ICD-10-CM

## 2023-04-13 DIAGNOSIS — O09.11 PREGNANCY IN FIRST TRIMESTER WITH HISTORY OF ECTOPIC PREGNANCY: Primary | ICD-10-CM

## 2023-04-13 LAB — B-HCG SERPL-ACNC: 8134 IU/L (ref 0–5)

## 2023-04-13 PROCEDURE — 84702 CHORIONIC GONADOTROPIN TEST: CPT

## 2023-04-13 PROCEDURE — 36415 COLL VENOUS BLD VENIPUNCTURE: CPT

## 2023-04-14 ENCOUNTER — ANCILLARY PROCEDURE (OUTPATIENT)
Dept: ULTRASOUND IMAGING | Facility: CLINIC | Age: 38
End: 2023-04-14
Attending: OBSTETRICS & GYNECOLOGY
Payer: COMMERCIAL

## 2023-04-14 ENCOUNTER — MYC MEDICAL ADVICE (OUTPATIENT)
Dept: OBGYN | Facility: CLINIC | Age: 38
End: 2023-04-14

## 2023-04-14 DIAGNOSIS — O09.11 PREGNANCY IN FIRST TRIMESTER WITH HISTORY OF ECTOPIC PREGNANCY: Primary | ICD-10-CM

## 2023-04-14 DIAGNOSIS — O09.11 PREGNANCY IN FIRST TRIMESTER WITH HISTORY OF ECTOPIC PREGNANCY: ICD-10-CM

## 2023-04-14 PROCEDURE — 76801 OB US < 14 WKS SINGLE FETUS: CPT | Performed by: RADIOLOGY

## 2023-04-14 PROCEDURE — 76817 TRANSVAGINAL US OBSTETRIC: CPT | Performed by: RADIOLOGY

## 2023-04-14 NOTE — RESULT ENCOUNTER NOTE
"Congratulations, your ultrasound shows an intrauterine pregnancy.  It is too early to see the baby yet, so we will plan to repeat the ultrasound the week of the 24th.  You may call to schedule that appointment at your convenience.  We can do the ultrasound sooner if you develop concerning symptoms.  The \"mural nodule\" is a nonspecific finding and not necessarily concerning at this time.    Please let me know if you have any questions or concerns.     Thanks!  Dr. Ocasio  "

## 2023-04-17 ENCOUNTER — MYC MEDICAL ADVICE (OUTPATIENT)
Dept: OBGYN | Facility: CLINIC | Age: 38
End: 2023-04-17
Payer: COMMERCIAL

## 2023-04-17 NOTE — TELEPHONE ENCOUNTER
LMP 2/26/23, 7w 1d based on LMP, 4/14 US indicating intrauterine pregnancy    Pt would like to know if it is safe to continue her current dose of Zoloft 50 mg daily, during her pregnancy.    Routing for review.    Zaida Gomez RN on 4/17/2023 at 9:42 AM

## 2023-04-19 ENCOUNTER — PRENATAL OFFICE VISIT (OUTPATIENT)
Dept: OBGYN | Facility: CLINIC | Age: 38
End: 2023-04-19
Payer: COMMERCIAL

## 2023-04-19 DIAGNOSIS — Z91.199 NO-SHOW FOR APPOINTMENT: Primary | ICD-10-CM

## 2023-04-26 ENCOUNTER — ANCILLARY PROCEDURE (OUTPATIENT)
Dept: ULTRASOUND IMAGING | Facility: CLINIC | Age: 38
End: 2023-04-26
Attending: OBSTETRICS & GYNECOLOGY
Payer: COMMERCIAL

## 2023-04-26 DIAGNOSIS — O09.11 PREGNANCY IN FIRST TRIMESTER WITH HISTORY OF ECTOPIC PREGNANCY: ICD-10-CM

## 2023-04-26 PROCEDURE — 76801 OB US < 14 WKS SINGLE FETUS: CPT

## 2023-04-26 PROCEDURE — 76817 TRANSVAGINAL US OBSTETRIC: CPT

## 2023-04-28 ENCOUNTER — TRANSCRIBE ORDERS (OUTPATIENT)
Dept: MATERNAL FETAL MEDICINE | Facility: CLINIC | Age: 38
End: 2023-04-28

## 2023-04-28 ENCOUNTER — VIRTUAL VISIT (OUTPATIENT)
Dept: OBGYN | Facility: CLINIC | Age: 38
End: 2023-04-28
Payer: COMMERCIAL

## 2023-04-28 DIAGNOSIS — N83.201 CYST OF RIGHT OVARY: Primary | ICD-10-CM

## 2023-04-28 DIAGNOSIS — O26.90 PREGNANCY RELATED CONDITION, ANTEPARTUM: Primary | ICD-10-CM

## 2023-04-28 DIAGNOSIS — O09.521 MULTIGRAVIDA OF ADVANCED MATERNAL AGE IN FIRST TRIMESTER: ICD-10-CM

## 2023-04-28 DIAGNOSIS — Z33.1 PREGNANCY, INCIDENTAL: ICD-10-CM

## 2023-04-28 PROBLEM — O00.101 RIGHT TUBAL PREGNANCY WITHOUT INTRAUTERINE PREGNANCY: Status: RESOLVED | Noted: 2021-12-01 | Resolved: 2023-04-28

## 2023-04-28 PROCEDURE — 99214 OFFICE O/P EST MOD 30 MIN: CPT | Mod: 95 | Performed by: OBSTETRICS & GYNECOLOGY

## 2023-04-28 NOTE — PROGRESS NOTES
Kelsea Murray is a 37 year old year old who is being evaluated via a billable telephone visit.      What phone number would you like to be contacted at? [phone]  How would you like to obtain your AVS? Tantaline  Originating Location (pt. Location): HOME  Distant Location (provider location):  On-site  OB/GYN      NAME:  Kelsea Murray  PCP:  [facility]  MRN:  [id number]    Impression / Plan     37 year old  with:      ICD-10-CM    1. Cyst of right ovary  N83.201       2. Pregnancy, incidental  Z33.1       3. Multigravida of advanced maternal age in first trimester  O09.521 [facility] Referral - Pregnancy          Reviewed imaging results personally and with the patient.  Given the stability of the cyst, small size, and other clinical factors, the risk of malignancy is low. Also, there is no other cyst present at her first ultrasound when she was about 5-6 weeks, so this is likely the corpus luteum.  Corpus luteum cyst typically persist through 7 weeks and generally resorb, but persist in some patients.     Patient is AMA and would benefit from ultrasound with MFM, and I recommend the cyst be looked at again with those ultrasounds.  Referral for MFM placed for nuchal translucency, genetic counseling.  Plan comprehensive ultrasound with them as well.    Chief Complaint     Chief Complaint   Patient presents with     Follow Up       HPI     Kelsea Murray is a  37 year old female who is seen to discuss ovarian cyst that was seen on recent ultrasound.  She is currently about 8 weeks pregnant (8w 5d).  Her LMP is 23, giving her an WOLFGANG by LMP of 12/3/23, which is consistent with recent ultrasound (23 = 7w 4d, WOLFGANG 23)    We have been corresponding through [employer] and patient has additional concerns regarding the cyst.  In my opinion, the cyst looks like a normal corpus luteum cyst. The patient was told that we generally look at the whole clinical  picture and this is not likely to be consistent with malignancy.   There is no free fluid in the pelvis which is also a good sign that this is benign.  Repeat imaging showed no change.     She has been looking on [employer] and is concerned about malignancy.  She sent a [employer] message yesterday stating that the cyst is still giving her a lot of anxiety.  She is also requesting an ultrasound earlier than 18 weeks, which was what I initially recommended.  She also is requesting removal of the cyst because she does not want to risk it being cancerous.      Problem List     Patient Active Problem List    Diagnosis Date Noted     Obesity, morbid, BMI 40.0-49.9 (H) 01/15/2018     Priority: Medium       Medications     Current Outpatient Medications   Medication     Docosahexaenoic Acid (DHA PO)     Prenatal Vit-Fe Fumarate-FA (PRENATAL PO)     sertraline (ZOLOFT) 50 MG tablet     No current facility-administered medications for this visit.        Allergies     No Known Allergies    ROS     Pertinent positives and negatives are listed in the HPI.     Physical Exam   Vitals: There were no vitals taken for this visit.    Telephone visit    Labs/Imaging       I have personally reviewed the labs/imaging and the findings were:    US 4/14/23:   - IUP with GS and YS c/w 5w 6d   - Right ovarian cyst with possible mural nodule, 3.1 cm.  Consistent with corpus luteum   - endometrial cysts measuring 3 mm, previously  visualized on prior ultrasound.    US 4/23/23:  CRL c/w 7w 4d, .  Right ovarian cyst stable, 3.3 cm with a mural nodule in the right ovary, possibly  corpus luteum.      35 min spent on the date of the encounter in chart review, patient visit, review of tests, documentation about the issues documented above.   Telephone time 10 min    Roseline Ocasio MD

## 2023-05-11 ENCOUNTER — MYC MEDICAL ADVICE (OUTPATIENT)
Dept: OBGYN | Facility: CLINIC | Age: 38
End: 2023-05-11
Payer: COMMERCIAL

## 2023-05-11 DIAGNOSIS — O36.80X0 ULTRASOUND SCAN TO CONFIRM FETAL VIABILITY WITH HISTORY OF MISCARRIAGE: Primary | ICD-10-CM

## 2023-05-11 DIAGNOSIS — Z87.59 ULTRASOUND SCAN TO CONFIRM FETAL VIABILITY WITH HISTORY OF MISCARRIAGE: Primary | ICD-10-CM

## 2023-05-11 NOTE — TELEPHONE ENCOUNTER
, 9w5d.  Pt had a VV with Dr. Ocasio on  for an ovarian cyst.  Pt is scheduled for a New Prenatal visit with Dr. Ocasio on .  She has already had 2 US done: one on  and the other on .    Pt is writing in concerned that for the last 3-4 days her pregnancy symptoms have decreased and are now gone.  She is wondering if this is something she should be concerned about.    Denies abdominal pain/cramping or vaginal bleeding.    Routing to Dr. Ocasio to see if pt should continue to monitor for vaginal bleeding and/or abdominal cramping or if she should get an US for further evaluation.    Cindy Fernandez RN

## 2023-05-16 ENCOUNTER — PRE VISIT (OUTPATIENT)
Dept: MATERNAL FETAL MEDICINE | Facility: CLINIC | Age: 38
End: 2023-05-16
Payer: COMMERCIAL

## 2023-05-18 ENCOUNTER — MYC REFILL (OUTPATIENT)
Dept: FAMILY MEDICINE | Facility: CLINIC | Age: 38
End: 2023-05-18

## 2023-05-18 ENCOUNTER — ANCILLARY PROCEDURE (OUTPATIENT)
Dept: ULTRASOUND IMAGING | Facility: CLINIC | Age: 38
End: 2023-05-18
Attending: OBSTETRICS & GYNECOLOGY
Payer: COMMERCIAL

## 2023-05-18 DIAGNOSIS — O36.80X0 ULTRASOUND SCAN TO CONFIRM FETAL VIABILITY WITH HISTORY OF MISCARRIAGE: ICD-10-CM

## 2023-05-18 DIAGNOSIS — Z87.59 ULTRASOUND SCAN TO CONFIRM FETAL VIABILITY WITH HISTORY OF MISCARRIAGE: ICD-10-CM

## 2023-05-18 DIAGNOSIS — F41.9 ANXIETY: ICD-10-CM

## 2023-05-18 PROCEDURE — 76801 OB US < 14 WKS SINGLE FETUS: CPT | Performed by: RADIOLOGY

## 2023-05-22 ENCOUNTER — MYC MEDICAL ADVICE (OUTPATIENT)
Dept: FAMILY MEDICINE | Facility: CLINIC | Age: 38
End: 2023-05-22
Payer: COMMERCIAL

## 2023-06-04 ENCOUNTER — HEALTH MAINTENANCE LETTER (OUTPATIENT)
Age: 38
End: 2023-06-04

## 2023-06-05 NOTE — PATIENT INSTRUCTIONS
If you have labs or imaging done, the results will automatically release in Antengo without an interpretation.  Your health care professional will review those results and send an interpretation with recommendations as soon as possible, but this may be 1-3 business days.    If you have any questions regarding your visit, please contact your care team.     [employer] Access Services: [phone]  [facility] CLINIC HOURS TELEPHONE NUMBER       MD Lesa Thompson - Certified Medical Assistant     Aline- REGINA RN  Cindy-MAO Cerda-MAO Pierson-  Michelle-     Monday- [place]  8:00 a.m - 5:00 p.m    Tuesday- Surgery        Thursday- [place]  8:00 a.m - 5:00 p.m.    Friday- [place]  7:30 a.m - 4:00 p.m. [facility]  [address]  [place] [address]  [phone] Fax  Imaging Scheduling [phone]    [facility] Labor and Delivery  [address] [place] [address] Dr.  [place], [place] [address] [phone]    [facility]  [address] [place], [place] [address] [phone] Fax  Imaging Scheduling [phone]     Urgent Care locations:  [facility] Monday-Friday  10 am - 8 pm  Saturday and Sunday   9 am - 5 pm  Monday-Friday   10 am - 8 pm  Saturday and Sunday   9 am - 5 pm   [phone]     **Surgeries** Our Surgery Schedulers will contact you to schedule. If you do not receive a call within 3 business days, please call [phone].    If you need a medication refill, please contact your pharmacy. Please allow 3 business days for your refill to be completed.    As always, thank you for trusting us with your healthcare needs!

## 2023-06-09 ENCOUNTER — PRENATAL OFFICE VISIT (OUTPATIENT)
Dept: OBGYN | Facility: CLINIC | Age: 38
End: 2023-06-09
Payer: COMMERCIAL

## 2023-06-09 VITALS
WEIGHT: 279.3 LBS | HEART RATE: 98 BPM | BODY MASS INDEX: 42.33 KG/M2 | DIASTOLIC BLOOD PRESSURE: 81 MMHG | HEIGHT: 68 IN | SYSTOLIC BLOOD PRESSURE: 123 MMHG

## 2023-06-09 DIAGNOSIS — Z87.59 HISTORY OF GESTATIONAL HYPERTENSION: ICD-10-CM

## 2023-06-09 DIAGNOSIS — O09.529 ANTEPARTUM MULTIGRAVIDA OF ADVANCED MATERNAL AGE: Primary | ICD-10-CM

## 2023-06-09 PROCEDURE — 99213 OFFICE O/P EST LOW 20 MIN: CPT | Performed by: OBSTETRICS & GYNECOLOGY

## 2023-06-09 RX ORDER — ASPIRIN 81 MG/1
TABLET ORAL
COMMUNITY

## 2023-06-09 NOTE — PROGRESS NOTES
37 year old  at 13w6d presents for New OB appointment.  Estimated Date of Delivery: Dec 9, 2023 by ultrasound at 7w 4d.   I saw her about 6 weeks ago to discuss the ovarian cyst, which was consistent with a corpus luteum cyst.  MFM referral was placed at that time due to AMA.      Nausea improving.   No vaginal bleeding, no discharge, no contractions.  Occasional cramps.     Electronic chart, including labs and imaging reviewed.    Last PHQ-9 score on record=       2020     2:30 PM   PHQ-9 SCORE   PHQ-9 Total Score 2       Obstetric History  OB History    Para Term  AB Living   3 1 1 0 1 1   SAB IAB Ectopic Multiple Live Births   0 0 1 0 1      # Outcome Date GA Lbr Suhas/2nd Weight Sex Delivery Anes PTL Lv   3 Current            2 Term 20 39w3d 01:05 / 00:53 3.19 kg (7 lb 0.5 oz) M Vag-Spont EPI, Other N KEVEN      Complications: Gestational hypertension      Name: JUAN VERDUZCO      Apgar1: 9  Apgar5: 9   1 Ectopic 2018                 Most Recent Immunizations   Administered Date(s) Administered     COVID-19 MONOVALENT 12+ (Pfizer) 2022     DTaP, Unspecified 1988     Hib, Unspecified 1988     Influenza Vaccine >6 months (Alfuria,Fluzone) 2022     MMR 1987     OPV, trivalent, live 1988     TDAP Vaccine (Adacel) 2020     TDAP Vaccine (Boostrix) 2017        Patient Active Problem List   Diagnosis     Obesity, morbid, BMI 40.0-49.9 (H)     History of gestational hypertension     Antepartum multigravida of advanced maternal age       Current Outpatient Medications   Medication     aspirin 81 MG EC tablet     Docosahexaenoic Acid (DHA PO)     Prenatal Vit-Fe Fumarate-FA (PRENATAL PO)     sertraline (ZOLOFT) 50 MG tablet     No current facility-administered medications for this visit.       No Known Allergies    History  Past Medical History:   Diagnosis Date     Acute pancreatitis 2019     Obesity, morbid, BMI 40.0-49.9 (H)  "1/15/2018     Past Surgical History:   Procedure Laterality Date     ANKLE SURGERY Right 09/2018     COLONOSCOPY N/A 4/12/2021    Procedure: Colonoscopy, With Polypectomy And Biopsy;  Surgeon: Humberto Dunn MD;  Location: [facility] OR     COLONOSCOPY WITH CO2 INSUFFLATION N/A 4/12/2021    Procedure: COLONOSCOPY, WITH CO2 INSUFFLATION;  Surgeon: Humberto Dunn MD;  Location: [facility] OR     LAPAROSCOPY DIAGNOSTIC (GYN)  09/2018    Ectopic       Social History     Socioeconomic History     Marital status:      Spouse name: Not on file     Number of children: 1     Years of education: Not on file     Highest education level: Not on file   Occupational History     Not on file   Tobacco Use     Smoking status: Never     Smokeless tobacco: Never   Vaping Use     Vaping status: Never Used   Substance and Sexual Activity     Alcohol use: Not Currently     Drug use: No     Sexual activity: Yes     Partners: Male     Birth control/protection: None   Other Topics Concern     Parent/sibling w/ CABG, MI or angioplasty before 65F 55M? Not Asked   Social History Narrative     Not on file     Social Determinants of Health     Financial Resource Strain: Not on file   Food Insecurity: Not on file   Transportation Needs: Not on file   Physical Activity: Not on file   Stress: Not on file   Social Connections: Not on file   Intimate Partner Violence: Not on file   Housing Stability: Not on file       ROS - Please see HPI, otherwise 10pt ROS negative.      Physical Exam  Vitals: /81   Pulse 98   Ht 1.727 m (5' 8\")   Wt 126.7 kg (279 lb 4.8 oz)   LMP 02/26/2023   BMI 42.47 kg/m    BMI= Body mass index is 42.47 kg/m .  Gen: Alert and oriented times 3, no acute distress.  Well developed, well nourished, pleasant.    Neck: Supple, no masses.  No thyromegaly.  Chest:  Non labored.  Clear to auscultation bilaterally.    Heart: Regular, normal S1, S2.  No murmurs.   Abdomen: Soft, nontender, nondistended.  No " palpable masses.    :  deferred  Extremities:  Nontender, no edema.      Assessment and Plan:  37 year old  with IUP at 13w6d.      ICD-10-CM    1. Antepartum multigravida of advanced maternal age  O09.529 ABO/Rh type and screen     CBC with platelets     Comprehensive metabolic panel     Hepatitis B surface antigen     Hepatitis C antibody     HIV Antigen Antibody Combo     Protein  random urine     Rubella Antibody IgG     Treponema Abs w Reflex to RPR and Titer     UA with Microscopic     Urine Culture     Glucose tolerance gest screen 1 hour      2. History of gestational hypertension  Z87.59           Discussed genetic screening options:  MFM referral previously placed.  She will call to schedule.  Ordered labs and ultrasound.   Early GCT needed  Folder given   Body mass index is 42.47 kg/m . - recommend 11-20 lbs weight gain  Low dose aspirin for preeclampsia risk reduction   Discussed zoloft in pregnancy.    Return in 4 weeks for routine OB care       Roseline Ocasio MD FACOG

## 2023-06-26 ENCOUNTER — MYC MEDICAL ADVICE (OUTPATIENT)
Dept: OBGYN | Facility: CLINIC | Age: 38
End: 2023-06-26
Payer: COMMERCIAL

## 2023-06-26 DIAGNOSIS — Z87.59 HISTORY OF GESTATIONAL HYPERTENSION: ICD-10-CM

## 2023-06-26 DIAGNOSIS — O09.529 ANTEPARTUM MULTIGRAVIDA OF ADVANCED MATERNAL AGE: Primary | ICD-10-CM

## 2023-06-26 DIAGNOSIS — N83.201 CYST OF RIGHT OVARY: ICD-10-CM

## 2023-06-26 NOTE — TELEPHONE ENCOUNTER
Pt last seen 6/9/2023, currently 16w2d. Pt has hx of ovarian cyst, last US on 5/18/2023 showed it was stable.    Pt seeing MFM for level 2 US    RN routing to provider to advise on f/u US for 18 weeks if appropriate or if this is being done with MFM.    Aline Otto RN on 6/26/2023 at 12:12 PM

## 2023-06-28 ENCOUNTER — TRANSCRIBE ORDERS (OUTPATIENT)
Dept: MATERNAL FETAL MEDICINE | Facility: CLINIC | Age: 38
End: 2023-06-28
Payer: COMMERCIAL

## 2023-06-28 DIAGNOSIS — O26.90 PREGNANCY RELATED CONDITION, ANTEPARTUM: Primary | ICD-10-CM

## 2023-06-28 NOTE — TELEPHONE ENCOUNTER
"Pt states she never completed the nuchal translucency scan at [facility] due to being sick.    Pt is writing in stating she just spoke with [facility] and needs a new [facility] referral for the \"18 week comprehensive scan\".    Routing to Dr. Ocasio.    Cindy Fernandez RN    "

## 2023-07-16 LAB
ABO/RH(D): NORMAL
ANTIBODY SCREEN: NEGATIVE
SPECIMEN EXPIRATION DATE: NORMAL

## 2023-07-17 ENCOUNTER — LAB (OUTPATIENT)
Dept: LAB | Facility: CLINIC | Age: 38
End: 2023-07-17
Payer: COMMERCIAL

## 2023-07-17 ENCOUNTER — PRENATAL OFFICE VISIT (OUTPATIENT)
Dept: OBGYN | Facility: CLINIC | Age: 38
End: 2023-07-17
Payer: COMMERCIAL

## 2023-07-17 VITALS — BODY MASS INDEX: 42.82 KG/M2 | SYSTOLIC BLOOD PRESSURE: 119 MMHG | WEIGHT: 281.6 LBS | DIASTOLIC BLOOD PRESSURE: 82 MMHG

## 2023-07-17 DIAGNOSIS — O09.529 ANTEPARTUM MULTIGRAVIDA OF ADVANCED MATERNAL AGE: ICD-10-CM

## 2023-07-17 DIAGNOSIS — O09.529 ANTEPARTUM MULTIGRAVIDA OF ADVANCED MATERNAL AGE: Primary | ICD-10-CM

## 2023-07-17 DIAGNOSIS — R30.0 DIFFICULT OR PAINFUL URINATION: ICD-10-CM

## 2023-07-17 DIAGNOSIS — Z87.59 HISTORY OF GESTATIONAL HYPERTENSION: ICD-10-CM

## 2023-07-17 LAB
ALBUMIN MFR UR ELPH: <6 MG/DL
ALBUMIN SERPL BCG-MCNC: 3.7 G/DL (ref 3.5–5.2)
ALBUMIN UR-MCNC: NEGATIVE MG/DL
ALP SERPL-CCNC: 79 U/L (ref 35–104)
ALT SERPL W P-5'-P-CCNC: 17 U/L (ref 0–50)
ANION GAP SERPL CALCULATED.3IONS-SCNC: 11 MMOL/L (ref 7–15)
APPEARANCE UR: CLEAR
AST SERPL W P-5'-P-CCNC: 23 U/L (ref 0–45)
BILIRUB SERPL-MCNC: 0.2 MG/DL
BILIRUB UR QL STRIP: NEGATIVE
BUN SERPL-MCNC: 5.3 MG/DL (ref 6–20)
CALCIUM SERPL-MCNC: 9.1 MG/DL (ref 8.6–10)
CHLORIDE SERPL-SCNC: 104 MMOL/L (ref 98–107)
COLOR UR AUTO: COLORLESS
CREAT SERPL-MCNC: 0.5 MG/DL (ref 0.51–0.95)
CREAT UR-MCNC: 20.5 MG/DL
DEPRECATED HCO3 PLAS-SCNC: 21 MMOL/L (ref 22–29)
ERYTHROCYTE [DISTWIDTH] IN BLOOD BY AUTOMATED COUNT: 12.7 % (ref 10–15)
GFR SERPL CREATININE-BSD FRML MDRD: >90 ML/MIN/1.73M2
GLUCOSE 1H P 50 G GLC PO SERPL-MCNC: 117 MG/DL (ref 70–129)
GLUCOSE SERPL-MCNC: 115 MG/DL (ref 70–99)
GLUCOSE UR STRIP-MCNC: NEGATIVE MG/DL
HBV SURFACE AG SERPL QL IA: NONREACTIVE
HCT VFR BLD AUTO: 38 % (ref 35–47)
HCV AB SERPL QL IA: NONREACTIVE
HGB BLD-MCNC: 12.9 G/DL (ref 11.7–15.7)
HGB UR QL STRIP: NEGATIVE
HIV 1+2 AB+HIV1 P24 AG SERPL QL IA: NONREACTIVE
KETONES UR STRIP-MCNC: NEGATIVE MG/DL
LEUKOCYTE ESTERASE UR QL STRIP: NEGATIVE
MCH RBC QN AUTO: 28.9 PG (ref 26.5–33)
MCHC RBC AUTO-ENTMCNC: 33.9 G/DL (ref 31.5–36.5)
MCV RBC AUTO: 85 FL (ref 78–100)
NITRATE UR QL: NEGATIVE
PH UR STRIP: 6 [PH] (ref 5–7)
PLATELET # BLD AUTO: 268 10E3/UL (ref 150–450)
POTASSIUM SERPL-SCNC: 3.8 MMOL/L (ref 3.4–5.3)
PROT SERPL-MCNC: 6.5 G/DL (ref 6.4–8.3)
PROT/CREAT 24H UR: NORMAL MG/G{CREAT}
RBC # BLD AUTO: 4.46 10E6/UL (ref 3.8–5.2)
RUBV IGG SERPL QL IA: 2.87 INDEX
RUBV IGG SERPL QL IA: POSITIVE
SKIP: NORMAL
SODIUM SERPL-SCNC: 136 MMOL/L (ref 136–145)
SP GR UR STRIP: 1 (ref 1–1.03)
T PALLIDUM AB SER QL: NONREACTIVE
UROBILINOGEN UR STRIP-MCNC: NORMAL MG/DL
WBC # BLD AUTO: 8.8 10E3/UL (ref 4–11)

## 2023-07-17 PROCEDURE — 86850 RBC ANTIBODY SCREEN: CPT

## 2023-07-17 PROCEDURE — 99207 PR PRENATAL VISIT: CPT | Performed by: OBSTETRICS & GYNECOLOGY

## 2023-07-17 PROCEDURE — 82950 GLUCOSE TEST: CPT

## 2023-07-17 PROCEDURE — 85027 COMPLETE CBC AUTOMATED: CPT

## 2023-07-17 PROCEDURE — 84156 ASSAY OF PROTEIN URINE: CPT

## 2023-07-17 PROCEDURE — 87389 HIV-1 AG W/HIV-1&-2 AB AG IA: CPT

## 2023-07-17 PROCEDURE — 86780 TREPONEMA PALLIDUM: CPT

## 2023-07-17 PROCEDURE — 80053 COMPREHEN METABOLIC PANEL: CPT

## 2023-07-17 PROCEDURE — 81003 URINALYSIS AUTO W/O SCOPE: CPT

## 2023-07-17 PROCEDURE — 99000 SPECIMEN HANDLING OFFICE-LAB: CPT

## 2023-07-17 PROCEDURE — 86803 HEPATITIS C AB TEST: CPT

## 2023-07-17 PROCEDURE — 86901 BLOOD TYPING SEROLOGIC RH(D): CPT

## 2023-07-17 PROCEDURE — 87086 URINE CULTURE/COLONY COUNT: CPT

## 2023-07-17 PROCEDURE — 86900 BLOOD TYPING SEROLOGIC ABO: CPT

## 2023-07-17 PROCEDURE — 81511 FTL CGEN ABNOR FOUR ANAL: CPT | Mod: 90

## 2023-07-17 PROCEDURE — 86762 RUBELLA ANTIBODY: CPT

## 2023-07-17 PROCEDURE — 36415 COLL VENOUS BLD VENIPUNCTURE: CPT

## 2023-07-17 PROCEDURE — 87340 HEPATITIS B SURFACE AG IA: CPT

## 2023-07-17 NOTE — PROGRESS NOTES
Presents for routine  appointment.     No complaints.    No abnormal discharge, no leaking fluid, no contractions , no vaginal bleeding    ROS:   and GI negative.     Please see Prenatal Vitals and Notes Flowsheet for objective data.  Lab Results   Component Value Date    ABO A 2019    RH Pos 2019       A/P:  38 year old  at 19w2d       ICD-10-CM    1. Antepartum multigravida of advanced maternal age  O09.529 Maternal Quad Marker 2nd Trimester      2. History of gestational hypertension  Z87.59           MFM US scheduled tomorrow. She will need serial growth US and  testing.   Continue zoloft  Low dose aspirin for preeclampsia risk reduction   GCT, hgb greater or equal to 24 weeks   Follow up in 4 weeks.      Roseline Ocasio MD

## 2023-07-17 NOTE — PATIENT INSTRUCTIONS
If you have labs or imaging done, the results will automatically release in B[employer]im[employer] without an interpretation.  Your health care professional will review those results and send an interpretation with recommendations as soon as possible, but this may be 1-3 business days.    If you have any questions regarding your visit, please contact your care team.     [email or web address] Access Services: [phone]  [facility] CLINIC HOURS TELEPHONE NUMBER       MD Lesa Thompson - Certified Medical Assistant     Aline- LEAD RN  Cindy-MAO Arciniega-MAO Pierson-  Michelle-     Monday- [place]  8:00 a.m - 5:00 p.m    Tuesday- Surgery        Thursday- [place]  8:00 a.m - 5:00 p.m.    Friday- [place]  7:30 a.m - 4:00 p.m. [facility]  [address]  [place] [address]  [phone] Fax  Imaging Scheduling [phone]    [facility] Labor and Delivery  [address] [place] [address] Dr.  [place], [place] [address] [phone]    [facility]  [address] [place], [place] [address] [phone] Fax  Imaging Scheduling [phone]     Urgent Care locations:  [facility] Monday-Friday  10 am - 8 pm  Saturday and Sunday   9 am - 5 pm  Monday-Friday   10 am - 8 pm  Saturday and Sunday   9 am - 5 pm   [phone]     **Surgeries** Our Surgery Schedulers will contact you to schedule. If you do not receive a call within 3 business days, please call [phone].    If you need a medication refill, please contact your pharmacy. Please allow 3 business days for your refill to be completed.    As always, thank you for trusting us with your healthcare needs!

## 2023-07-18 ENCOUNTER — HOSPITAL ENCOUNTER (OUTPATIENT)
Dept: ULTRASOUND IMAGING | Facility: CLINIC | Age: 38
Discharge: HOME | End: 2023-07-18
Attending: OBSTETRICS & GYNECOLOGY
Payer: COMMERCIAL

## 2023-07-18 ENCOUNTER — OFFICE VISIT (OUTPATIENT)
Dept: MATERNAL FETAL MEDICINE | Facility: CLINIC | Age: 38
End: 2023-07-18
Attending: OBSTETRICS & GYNECOLOGY
Payer: COMMERCIAL

## 2023-07-18 ENCOUNTER — MEDICAL CORRESPONDENCE (OUTPATIENT)
Dept: HEALTH INFORMATION MANAGEMENT | Facility: CLINIC | Age: 38
End: 2023-07-18

## 2023-07-18 ENCOUNTER — LAB (OUTPATIENT)
Dept: LAB | Facility: CLINIC | Age: 38
End: 2023-07-18
Attending: OBSTETRICS & GYNECOLOGY
Payer: COMMERCIAL

## 2023-07-18 DIAGNOSIS — O09.522 SUPERVISION OF ELDERLY MULTIGRAVIDA IN SECOND TRIMESTER: ICD-10-CM

## 2023-07-18 DIAGNOSIS — O26.90 PREGNANCY RELATED CONDITION, ANTEPARTUM: ICD-10-CM

## 2023-07-18 DIAGNOSIS — Z31.5 ENCOUNTER FOR PROCREATIVE GENETIC COUNSELING: ICD-10-CM

## 2023-07-18 DIAGNOSIS — O99.210 OBESITY IN PREGNANCY, ANTEPARTUM: ICD-10-CM

## 2023-07-18 DIAGNOSIS — O09.522 MULTIGRAVIDA OF ADVANCED MATERNAL AGE IN SECOND TRIMESTER: Primary | ICD-10-CM

## 2023-07-18 DIAGNOSIS — Z36.2 ENCOUNTER FOR FOLLOW-UP ULTRASOUND OF FETAL ANATOMY: ICD-10-CM

## 2023-07-18 DIAGNOSIS — O09.522 SUPERVISION OF ELDERLY MULTIGRAVIDA IN SECOND TRIMESTER: Primary | ICD-10-CM

## 2023-07-18 LAB
# FETUSES US: NORMAL
AFP MOM SERPL: 1.25
AFP SERPL-MCNC: 44 NG/ML
AGE - REPORTED: 37.4 YR
BACTERIA UR CULT: NORMAL
CURRENT SMOKER: NO
FAMILY MEMBER DISEASES HX: NO
GA METHOD: NORMAL
GA: NORMAL WK
HCG MOM SERPL: 1.09
HCG SERPL-ACNC: NORMAL IU/L
HX OF HEREDITARY DISORDERS: NO
IDDM PATIENT QL: NO
INHIBIN A MOM SERPL: 0.84
INHIBIN A SERPL-MCNC: 95 PG/ML
INTEGRATED SCN PATIENT-IMP: NORMAL
PATHOLOGY STUDY: NORMAL
SPECIMEN DRAWN SERPL: NORMAL
U ESTRIOL MOM SERPL: 0.99
U ESTRIOL SERPL-MCNC: 1.59 NG/ML

## 2023-07-18 PROCEDURE — 76811 OB US DETAILED SNGL FETUS: CPT | Mod: 26 | Performed by: OBSTETRICS & GYNECOLOGY

## 2023-07-18 PROCEDURE — 36415 COLL VENOUS BLD VENIPUNCTURE: CPT

## 2023-07-18 PROCEDURE — 76811 OB US DETAILED SNGL FETUS: CPT

## 2023-07-18 PROCEDURE — 96040 HC GENETIC COUNSELING, EACH 30 MINUTES: CPT

## 2023-07-18 PROCEDURE — 36416 COLLJ CAPILLARY BLOOD SPEC: CPT

## 2023-07-18 PROCEDURE — 99203 OFFICE O/P NEW LOW 30 MIN: CPT | Mod: 25 | Performed by: OBSTETRICS & GYNECOLOGY

## 2023-07-18 NOTE — PROGRESS NOTES
The patient was seen for an ultrasound in the Maternal-Fetal Medicine Center at the [facility] today.  For a detailed report of the ultrasound examination, please see the ultrasound report which can be found under the imaging tab.    If you have questions regarding today's evaluation or if we can be of further service, please contact the Maternal-Fetal Medicine Center.    Marie Simeon MD  , OB/GYN  Maternal-Fetal Medicine  [phone] (Pager)

## 2023-07-18 NOTE — PROGRESS NOTES
[facility] Fetal Medicine [place]  Genetic Counseling Consult    Patient:  Kelsea Murray YOB: 1985   Date of Service:  23   MRN: [id number]    Kelsea was seen at the [facility] Fetal [facility] for genetic consultation. The indication for genetic counseling is advanced maternal age and desire to discuss options for genetic screening and diagnostics. The patient was unaccompanied to this visit.     The session was conducted in English.      IMPRESSION/ PLAN   1. Kelsea's provider ordered a quad screen but the results are still pending. Kelsea would like to pursue non-invasive prenatal testing.     2. During today's [facility] visit, Kelsea had a blood draw for Expanded NIPS through [facility]. The core NIPS screens for trisomy 21, 18, and 13 and the patient opted to screen for sex chromosome aneuploidies, including reported fetal sex. In addition, expanded NIPS also includes microdeletion syndromes and rare autosomal trisomies. Results are expected in 7-14 days. We discussed that NIPT can be used to screen for 22q11.2 deletion syndrome. The data on this condition is more limited, so there is not a positive or negative predictive value available for results interpretation. The patient elected screening for 22q11.2 deletion syndrome.The patient will be called with results and if they do not answer they requested a detailed message with results on their voicemail, including the predicted fetal sex information.  Patient was informed that results, including fetal sex, will be available in [place].    3. Kelsea had a level II comprehensive anatomy ultrasound today. Please see the ultrasound report for further details.    4. Kelsea is scheduled for follow-up ultrasound with [facility] for 2023.    PREGNANCY HISTORY   /Parity:       Kelsea's pregnancy history is significant for:     Term: 2020    Ectopic:     CURRENT PREGNANCY   Current Age: 38  year old   Age at Delivery: 38 year old  WOLFGANG: 2023, by Ultrasound                                   Gestational Age: 19w3d  This pregnancy is a single gestation.   This pregnancy was conceived spontaneously.    MEDICAL HISTORY   Kelsea s reported medical history is not expected to impact pregnancy management or risks to fetal development.       FAMILY HISTORY   A three-generation pedigree was not obtained today due to our focus on other topics.     The reported family history is unremarkable for multiple miscarriages, stillbirths, birth defects, intellectual disabilities, known genetic conditions, and consanguinity.       CARRIER SCREENING   Expanded carrier screening is available to screen for autosomal recessive conditions and X-linked conditions in a large list of genes. Autosomal recessive conditions happen when a mutation has been inherited from the egg and sperm and include conditions like cystic fibrosis, thalassemia, hearing loss, spinal muscular atrophy, and more. X-linked conditions happen when a mutation has been inherited from the egg and include conditions like fragile X syndrome. [place] screening was also reviewed. About [place] Screening        Carrier screening was not discussed today. If the patient is interested in further discussing the option of carrier screening, MFM would be available to assist in coordination if desired.       RISK ASSESSMENT FOR CHROMOSOME CONDITIONS   We explained that the risk for fetal chromosome abnormalities increases with maternal age. We discussed specific features of common chromosome abnormalities, including Down syndrome, trisomy 13, trisomy 18, and sex chromosome trisomies.      At age 38 at midtrimester, the risk to have a baby with Down syndrome is 1 in 168.     At age 38 at midtrimester, the risk to have a baby with any chromosome abnormality is 1 in 82.     Kelsea had quad screen drawn but the results are still pending. She elected NIPT today.    Quad  screen results     Maternal plasma AFP, hCG, estriol, and inhibin measurement between 15-24 weeks gestation    Provides risk assessment for trisomy 21, trisomy 18, and neural tube defects      GENETIC TESTING OPTIONS   Genetic testing during a pregnancy includes screening and diagnostic procedures.      Screening tests are non-invasive which means no risk to the pregnancy and includes ultrasounds and blood work. The benefits and limitations of screening were reviewed. Screening tests provide a risk assessment (chance) specific to the pregnancy for certain fetal chromosome abnormalities but cannot definitively diagnose or exclude a fetal chromosome abnormality. Follow-up genetic counseling and consideration of diagnostic testing is recommended with any abnormal screening result. Diagnostic testing during a pregnancy is more certain and can test for more conditions. However, the tests do have a risk of miscarriage that requires careful consideration. These tests can detect fetal chromosome abnormalities with greater than 99% certainty. Results can be compromised by maternal cell contamination or mosaicism and are limited by the resolution of current genetic testing technology.     There is no screening or diagnostic test that detects all forms of birth defects or intellectual disability.     We discussed the following screening options:   Non-invasive prenatal testing (NIPT)    Also called cell-free DNA screening because it detects chromosomes from the placenta in the pregnant person's blood    Can be done any time after 10 weeks gestation    Screens for trisomy 21, trisomy 18, trisomy 13, and sex chromosome aneuploidies    Cannot screen for open neural tube defects, maternal serum AFP after 15 weeks is recommended    Quad Screen    Blood work from arm for levels of AFP, hCG, estriol, and inhibin    Can be done between 14w1d and 23w6d gestation    Screens for trisomy 21, trisomy 18, and neural tube defects    Not  recommended for patients over 35 or those who already had first trimester screening or NIPT      We discussed the following ultrasound options:  Comprehensive level II ultrasound (Fetal Anatomy Ultrasound)    Ultrasound done between 18-20 weeks gestation    Screens for major birth defects and markers for aneuploidy (like trisomy 21 and trisomy 18)    Includes looking at the fetus/baby's growth, heart, organs (stomach, kidneys), placenta, and amniotic fluid      We discussed the following diagnostic options:   Amniocentesis    Invasive diagnostic procedure done after 15 weeks gestation    The procedure collects a small sample of amniotic fluid for the purpose of chromosomal testing and/or other genetic testing    Diagnostic result; more than 99% sensitivity for fetal chromosome abnormalities    Testing for AFP in the amniotic fluid can test for open neural tube defects        It was a pleasure to be involved with [facility] care. Face-to-face time of the meeting was 30 minutes.    ERYN NESS GC, MS  Genetic Counselor Intern  [facility]  Maternal Fetal Medicine  Office: [phone]   [facility]: [phone]   Fax: [phone]  [facility]        Patient seen, evaluated and discussed with the Genetic Counseling Intern. I have verified the content of the note, which accurately reflects my assessment of the patient and the plan of care.   Supervising Genetic Counselor   Humberto Lomeli MS, [facility]   Maternal Fetal Medicine  [facility]   Phone: [phone]   Email: [email or web address]

## 2023-07-18 NOTE — NURSING NOTE
Patient presents to [facility] for L2US at 19+3 weeks due to AMA and maternal BMI 42. Denies LOF, vaginal bleeding, cramping/contractions. SBAR given to TREY MD, see their note in Epic.

## 2023-07-19 ENCOUNTER — MYC MEDICAL ADVICE (OUTPATIENT)
Dept: OBGYN | Facility: CLINIC | Age: 38
End: 2023-07-19
Payer: COMMERCIAL

## 2023-07-25 ENCOUNTER — TELEPHONE (OUTPATIENT)
Dept: MATERNAL FETAL MEDICINE | Facility: CLINIC | Age: 38
End: 2023-07-25
Payer: COMMERCIAL

## 2023-07-25 LAB — SCANNED LAB RESULT: NORMAL

## 2023-07-25 NOTE — TELEPHONE ENCOUNTER
July 25, 2023    I left a message for Kelsea regarding her NIPT results.     Results indicate NO ANEUPLOIDY DETECTED for chromosomes 21, 18, 13, or the sex chromosomes, and low risk for 22q11.2 deletion syndrome.     This puts her current pregnancy at low risk for Down syndrome, trisomy 18, trisomy 13 and sex chromosome abnormalities. This test is reported to have the following sensitivities: Down syndrome- >99.9%, trisomy 18- >99.9%, and trisomy 13- >99.9%. Although these results are reassuring, this does not replace a standard chromosome analysis from a chorionic villus sampling or amniocentesis.     Level II ultrasound is recommended, given AMA.     MSAFP is the appropriate second trimester screening test for open neural tube defects; the maternal quad screen is not recommended.    Her results are available in her Epic chart for her primary OB to review.    ERYN NESS GC, MS  Genetic Counselor Intern  [facility]  Maternal Fetal Medicine  Office: [phone]   [facility]: [phone]   Fax: [phone]  [facility]

## 2023-08-14 ENCOUNTER — HOSPITAL ENCOUNTER (OUTPATIENT)
Dept: ULTRASOUND IMAGING | Facility: CLINIC | Age: 38
Discharge: HOME | End: 2023-08-14
Attending: OBSTETRICS & GYNECOLOGY
Payer: COMMERCIAL

## 2023-08-14 ENCOUNTER — OFFICE VISIT (OUTPATIENT)
Dept: MATERNAL FETAL MEDICINE | Facility: CLINIC | Age: 38
End: 2023-08-14
Attending: OBSTETRICS & GYNECOLOGY
Payer: COMMERCIAL

## 2023-08-14 DIAGNOSIS — Z36.2 ENCOUNTER FOR FOLLOW-UP ULTRASOUND OF FETAL ANATOMY: ICD-10-CM

## 2023-08-14 DIAGNOSIS — O09.522 SUPERVISION OF ELDERLY MULTIGRAVIDA IN SECOND TRIMESTER: Primary | ICD-10-CM

## 2023-08-14 PROCEDURE — 76816 OB US FOLLOW-UP PER FETUS: CPT

## 2023-08-14 PROCEDURE — 76816 OB US FOLLOW-UP PER FETUS: CPT | Mod: 26 | Performed by: STUDENT IN AN ORGANIZED HEALTH CARE EDUCATION/TRAINING PROGRAM

## 2023-08-14 NOTE — PROGRESS NOTES
Please see the full imaging report from the ViewPoint program under the imaging tab.    Belén Khanna MD  Maternal Fetal Medicine

## 2023-08-14 NOTE — NURSING NOTE
Patient reports positive fetal movement, no pain, no contractions, leaking of fluid, or bleeding.   SBAR given to RL GROSS, see their note in Epic.

## 2023-08-22 ENCOUNTER — MYC REFILL (OUTPATIENT)
Dept: FAMILY MEDICINE | Facility: CLINIC | Age: 38
End: 2023-08-22
Payer: COMMERCIAL

## 2023-08-22 DIAGNOSIS — F41.9 ANXIETY: ICD-10-CM

## 2023-08-28 ENCOUNTER — MYC MEDICAL ADVICE (OUTPATIENT)
Dept: OBGYN | Facility: CLINIC | Age: 38
End: 2023-08-28

## 2023-08-28 ENCOUNTER — PRENATAL OFFICE VISIT (OUTPATIENT)
Dept: OBGYN | Facility: CLINIC | Age: 38
End: 2023-08-28
Payer: COMMERCIAL

## 2023-08-28 VITALS
HEART RATE: 79 BPM | DIASTOLIC BLOOD PRESSURE: 80 MMHG | SYSTOLIC BLOOD PRESSURE: 123 MMHG | BODY MASS INDEX: 44.7 KG/M2 | WEIGHT: 293 LBS

## 2023-08-28 DIAGNOSIS — O99.210 OBESITY IN PREGNANCY: ICD-10-CM

## 2023-08-28 DIAGNOSIS — O09.529 ANTEPARTUM MULTIGRAVIDA OF ADVANCED MATERNAL AGE: Primary | ICD-10-CM

## 2023-08-28 LAB
GLUCOSE 1H P 50 G GLC PO SERPL-MCNC: 104 MG/DL (ref 70–129)
HGB BLD-MCNC: 11.5 G/DL (ref 11.7–15.7)

## 2023-08-28 PROCEDURE — 99207 PR PRENATAL VISIT: CPT | Performed by: OBSTETRICS & GYNECOLOGY

## 2023-08-28 PROCEDURE — 36415 COLL VENOUS BLD VENIPUNCTURE: CPT | Performed by: OBSTETRICS & GYNECOLOGY

## 2023-08-28 PROCEDURE — 82950 GLUCOSE TEST: CPT | Performed by: OBSTETRICS & GYNECOLOGY

## 2023-08-28 NOTE — PATIENT INSTRUCTIONS
SIGNS OF  LABOR    Labor is  if it happens more than three weeks before your due date.    It can be hard to know if you are in labor, since the symptoms can be like the normal feelings of pregnancy.  Often, the only difference is the symptoms increase or they don't go away.     Signs of  labor can include:    Change in your vaginal discharge:  You will have more vaginal discharge when you are pregnant and it should be creamy white.  Call the clinic right away if your discharge has a foul odor, pink or bloody,  or if it becomes watery or is more than is normal for you during your pregnancy.    More than 5-6 contractions or tightenings per hour.  Contractions can feel like period cramps or bowel (gas or diarrhea) pain.  You will feel it in the lower part of your abdomen, in your back or as a pressure feeling in your bottom.  It is often regular, coming for 30 seconds or a minute and then going away, only to come back 5 or 10 minutes later. Some contractions are normal during pregnancy, but if you are feeling more than 5-6 in one hour, empty your bladder, then drink 16-24 ounces of water, eat a snack and lay down on your left side. Put your hand on your abdomen to count the contractions.  If after one hour of resting you have still had 5-6 contractions call your clinic.                  If you have labs or imaging done, the results will automatically release in PASS[facility]Y without an interpretation.  Your health care professional will review those results and send an interpretation with recommendations as soon as possible, but this may be 1-3 business days.    If you have any questions regarding your visit, please contact your care team.     [employer] [facility]: [phone]  Women s Health CLINIC HOURS TELEPHONE NUMBER       MD Lesa Thompson - Certified Medical Assistant     Aletha White-MAO [place]-  Michelle-     MondayYasmine [place]  8:00 a.m - 5:00 p.m    Tuesday- Surgery        Thursday- [place]  8:00 a.m - 5:00 p.m.    Friday- [place]  7:30 a.m - 4:00 p.m. [facility]  [address]  [place] [address]  [phone] Fax  Imaging Scheduling [phone]    [facility] Labor and Delivery  [address] [place] [address] [place] [address]  [phone]    [facility]  [address] [facility] [place] [address]  [phone] Fax  Imaging Scheduling [phone]     Urgent Care locations:  [facility] Monday-Friday  10 am - 8 pm  Saturday and Sunday   9 am - 5 pm  Monday-Friday   10 am - 8 pm  Saturday and Sunday   9 am - 5 pm   [phone]     **Surgeries** Our Surgery Schedulers will contact you to schedule. If you do not receive a call within 3 business days, please call [phone].    If you need a medication refill, please contact your pharmacy. Please allow 3 business days for your refill to be completed.    As always, thank you for trusting us with your healthcare needs!

## 2023-08-28 NOTE — TELEPHONE ENCOUNTER
Unfortunately there are a number of things that should be done in person - the diabetes screening, monitoring maternal weight, blood pressure, fundal height, and baby's heartbeat.  Therefore I recommend she be seen in person as her pregnancy is considered high risk.

## 2023-08-28 NOTE — PROGRESS NOTES
Presents for routine  appointment.  [facility] US 23: EFW 93% at 23 wks.  Recommend Growth US locally at 28 and 34 wks.  Weekly BPPs at 34 weeks due to obesity in pregnancy    Some hip pain  No leaking fluid, no contractions, no vaginal bleeding   ROS:   and GI negative.     A/P:  38 year old  at 25w2d       ICD-10-CM    1. Antepartum multigravida of advanced maternal age  O09.529 Glucose tolerance, gest screen, 1 hour     OB hemoglobin      2. Obesity in pregnancy  O99.210 US OB >14 Weeks Follow Up          1 hr glucola today.  She does have access to MyChart.  She is Rh Positive   TDAP  next visit.    Discussed signs and symptoms of  labor   Continue zoloft  Low dose aspirin for preeclampsia risk reduction   Follow up in 4 weeks.      Roseline Ocasio MD

## 2023-08-28 NOTE — TELEPHONE ENCOUNTER
, 25w2d.    Pt is scheduled for a prenatal visit with Dr. Ocasio today, , at 3:30.  She is asking if this can be switched to a phone visit as she has a work conflict.  It looks like the 1 hour glucose testing today at her appt.    Routing to Dr. Ocasio to see if pt's appt today can be switched to a phone visit and pt can reschedule her 1 hr gtt.    Cindy Fernandez RN

## 2023-09-13 ENCOUNTER — MYC MEDICAL ADVICE (OUTPATIENT)
Dept: OBGYN | Facility: OTHER | Age: 38
End: 2023-09-13
Payer: COMMERCIAL

## 2023-10-04 ENCOUNTER — ANCILLARY PROCEDURE (OUTPATIENT)
Dept: ULTRASOUND IMAGING | Facility: CLINIC | Age: 38
End: 2023-10-04
Attending: OBSTETRICS & GYNECOLOGY
Payer: COMMERCIAL

## 2023-10-04 ENCOUNTER — ANCILLARY PROCEDURE (OUTPATIENT)
Dept: MAMMOGRAPHY | Facility: CLINIC | Age: 38
End: 2023-10-04
Attending: OBSTETRICS & GYNECOLOGY
Payer: COMMERCIAL

## 2023-10-04 DIAGNOSIS — Z33.1 PREGNANCY, INCIDENTAL: ICD-10-CM

## 2023-10-04 DIAGNOSIS — O99.210 OBESITY IN PREGNANCY: ICD-10-CM

## 2023-10-04 DIAGNOSIS — N63.20 MASS OF LEFT BREAST, UNSPECIFIED QUADRANT: ICD-10-CM

## 2023-10-04 PROCEDURE — 77066 DX MAMMO INCL CAD BI: CPT

## 2023-10-04 PROCEDURE — 76642 ULTRASOUND BREAST LIMITED: CPT | Mod: LT

## 2023-10-04 PROCEDURE — G0279 TOMOSYNTHESIS, MAMMO: HCPCS

## 2023-10-04 PROCEDURE — 76816 OB US FOLLOW-UP PER FETUS: CPT

## 2023-10-09 ENCOUNTER — MYC MEDICAL ADVICE (OUTPATIENT)
Dept: OBGYN | Facility: CLINIC | Age: 38
End: 2023-10-09

## 2023-10-13 ENCOUNTER — PRENATAL OFFICE VISIT (OUTPATIENT)
Dept: OBGYN | Facility: CLINIC | Age: 38
End: 2023-10-13
Payer: COMMERCIAL

## 2023-10-13 VITALS
DIASTOLIC BLOOD PRESSURE: 78 MMHG | HEART RATE: 75 BPM | OXYGEN SATURATION: 98 % | BODY MASS INDEX: 45.61 KG/M2 | SYSTOLIC BLOOD PRESSURE: 116 MMHG | WEIGHT: 293 LBS

## 2023-10-13 DIAGNOSIS — Z23 FLU VACCINE NEED: ICD-10-CM

## 2023-10-13 DIAGNOSIS — O09.523 MULTIGRAVIDA OF ADVANCED MATERNAL AGE IN THIRD TRIMESTER: Primary | ICD-10-CM

## 2023-10-13 PROCEDURE — 90715 TDAP VACCINE 7 YRS/> IM: CPT | Performed by: OBSTETRICS & GYNECOLOGY

## 2023-10-13 PROCEDURE — 90471 IMMUNIZATION ADMIN: CPT | Performed by: OBSTETRICS & GYNECOLOGY

## 2023-10-13 PROCEDURE — 90686 IIV4 VACC NO PRSV 0.5 ML IM: CPT | Performed by: OBSTETRICS & GYNECOLOGY

## 2023-10-13 PROCEDURE — 90472 IMMUNIZATION ADMIN EACH ADD: CPT | Performed by: OBSTETRICS & GYNECOLOGY

## 2023-10-13 PROCEDURE — 99207 PR PRENATAL VISIT: CPT | Performed by: OBSTETRICS & GYNECOLOGY

## 2023-10-13 NOTE — PATIENT INSTRUCTIONS
If you have labs or imaging done, the results will automatically release in tic without an interpretation.  Your health care professional will review those results and send an interpretation with recommendations as soon as possible, but this may be 1-3 business days.    If you have any questions regarding your visit, please contact your care team.     [employer] Access [facility]: [phone]  Women s Health CLINIC HOURS TELEPHONE NUMBER   DO. Kenna Del Toro -Surgery Scheduler  Michelle -     MAO White RN Kylie, RN [place]    Monday 8:30 am-5:00 pm  Wednesday 8:30 am-5:00 pm  Friday 8:30 am-5:00 pm    Typical Surgery day: Tuesday [facility]  [address] [place] [address]  Phone:  [phone]  Fax: [phone]   Appointment [phone]    Imaging Scheduling-All Locations [phone]    [facility] Labor and Delivery  [address] [place] [address] [place] [address] [phone]     **Surgeries** Our Surgery Schedulers will contact you to schedule. If you do not receive a call within 3 business days, please call [phone].    Urgent Care locations:  [facility] Monday-Friday   10 am - 8 pm  Saturday and    9 am - 5 pm [phone]     If you need a medication refill, please contact your pharmacy. Please allow 3 business days for your refill to be completed.  As always, Thank you for trusting us with your healthcare needs!  see additional instructions from your care team below    Weeks 34 to 36 of Your Pregnancy: Care Instructions  Your belly has grown quite large. It's almost time to give birth! Your baby's lungs are almost ready to breathe air. The skull bones are firm enough to protect your baby's head. But they're soft enough to move down through the birth canal.    You might be wondering what to expect during labor. Because each birth is  "different, there's no way to know exactly what childbirth will be like for you. Talk to your doctor or midwife about any concerns you have.   You'll probably have a test for group B streptococcus (GBS). GBS is bacteria that can live in the vagina and rectum. GBS can make your baby sick after birth. If you test positive, you'll get antibiotics during labor.     Choose what type of pain relief you would like during labor.  You can choose from a few types, including medicine and non-medicine options. You may want to use several types of pain relief.     Know how medicines can help with pain during labor.  Some medicines lower anxiety and help with some of the pain. Others make your lower body numb so that you will feel less pain.     Tell your doctor about your pain medicine choice.  Do this before you start labor or very early in your labor. You may be able to change your mind during labor.     Learn about the stages of labor.    The first stage includes the early (latent) and active phases of labor. Contractions start in early labor. During active labor, contractions get stronger, last longer, and happen more often. And the cervix opens more rapidly.  The second stage starts when you're ready to push. During this stage, your baby is born.  During the third stage, you'll usually have a few more contractions to push out the placenta.   Follow-up care is a key part of your treatment and safety. Be sure to make and go to all appointments, and call your doctor if you are having problems. It's also a good idea to know your test results and keep a list of the medicines you take.  Where can you learn more?  Go to https://www.[employer].net/patiented  Enter B912 in the search box to learn more about \"Weeks 34 to 36 of Your Pregnancy: Care Instructions.\"  Current as of: November 9, 2022               Content Version: 13.7    9433-9029 [employer].   Care instructions adapted under license by your [facility] " "professional. If you have questions about a medical condition or this instruction, always ask your healthcare professional. [employer] disclaims any warranty or liability for your use of this information.      Weeks 32 to 34 of Your Pregnancy: Care Instructions    Decide whether you want to bank or donate your baby's umbilical cord blood. If you want to save this blood, you have to arrange for it ahead of time.   Decide about circumcision. Personal, Congregation, or cultural beliefs may play a role in your decision. You get to decide what you want for your baby.     Learn how to ease hemorrhoids.    Get more liquids, fruits, vegetables, and fiber in your diet.  Avoid sitting for too long.  Clean yourself with moist toilet paper. Or try witch hazel pads.  Try ice packs or warm sitz baths for discomfort.  Use hydrocortisone cream for pain or itching.  Ask your doctor about stool softeners.    Consider the benefits of breastfeeding.    It reduces your baby's risk of sudden infant death syndrome (SIDS).   babies are less likely to get certain infections. And they're less likely to be obese or get diabetes later in life.  It can lower your risk of breast and ovarian cancers and osteoporosis.  It saves you money.  Follow-up care is a key part of your treatment and safety. Be sure to make and go to all appointments, and call your doctor if you are having problems. It's also a good idea to know your test results and keep a list of the medicines you take.  Where can you learn more?  Go to https://www.[employer].net/patiented  Enter X711 in the search box to learn more about \"Weeks 32 to 34 of Your Pregnancy: Care Instructions.\"  Current as of: November 9, 2022               Content Version: 13.7    7101-7552 [employer].   Care instructions adapted under license by your healthcare professional. If you have questions about a medical condition or this instruction, always ask your healthcare " professional. [employer] disclaims any warranty or liability for your use of this information.

## 2023-10-13 NOTE — PROGRESS NOTES
Prior to immunization administration, verified patients identity using patient s name and date of birth. Please see Immunization Activity for additional information.     Screening Questionnaire for Adult Immunization    Are you sick today?   No   Do you have allergies to medications, food, a vaccine component or latex?   No   Have you ever had a serious reaction after receiving a vaccination?   No   Do you have a long-term health problem with heart, lung, kidney, or metabolic disease (e.g., diabetes), asthma, a blood disorder, no spleen, complement component deficiency, a cochlear implant, or a spinal fluid leak?  Are you on long-term aspirin therapy?   No   Do you have cancer, leukemia, HIV/AIDS, or any other immune system problem?   No   Do you have a parent, brother, or sister with an immune system problem?   No   In the past 3 months, have you taken medications that affect  your immune system, such as prednisone, other steroids, or anticancer drugs; drugs for the treatment of rheumatoid arthritis, Crohn s disease, or psoriasis; or have you had radiation treatments?   No   Have you had a seizure, or a brain or other nervous system problem?   No   During the past year, have you received a transfusion of blood or blood    products, or been given immune (gamma) globulin or antiviral drug?   No   For women: Are you pregnant or is there a chance you could become       pregnant during the next month?   Yes   Have you received any vaccinations in the past 4 weeks?   No     Immunization questionnaire was positive for at least one answer.      Patient instructed to remain in clinic for 15 minutes afterwards, and to report any adverse reactions.     Screening performed by Aris Newsome MA on 10/13/2023 at 1:41 PM.

## 2023-10-13 NOTE — PROGRESS NOTES
She reports feeling reassuring daily fetal activity and will continue to record.  She gained 6 lbs since her last visit and denies any fluid leakage or regular uterine contractions.  She will need a fetal growth US at 34 weeks and prefers that this be done here rather than through the [facility] office.  She will also need weekly BPPs starting at 34 weeks gestation.  She would like both the Tdap and flu vaccines so these will be provided today.

## 2023-10-30 NOTE — PROGRESS NOTES
Presents for routine  appointment.    Back pain and pressure  No leaking fluid, no contractions aside from BH, no vaginal bleeding   ROS:   and GI  negative.     Please see Prenatal Vitals and Notes Flowsheet for objective data.  /80   Wt 137.9 kg (304 lb 1.6 oz)   LMP 2023   BMI 46.24 kg/m       A/P:  38 year old  at 34w6d        ICD-10-CM    1. Multigravida of advanced maternal age in third trimester  O09.523 US OB Biophys Single Gestation Measure      2. Obesity in pregnancy  O99.210 US OB [employer] Single Gestation Measure      3. History of gestational hypertension  Z87.59           Reportable signs and symptoms discussed  Group B Strep at 36+ weeks   Continue zoloft  low dose aspirin for preeclampsia risk reduction   Growth US with BPP ordered but not yet scheduled.  Weekly BPPs also ordered but not yet scheduled.   Follow up in 2 weeks.      Roseline Ocasio MD

## 2023-11-01 ENCOUNTER — MYC MEDICAL ADVICE (OUTPATIENT)
Dept: OBGYN | Facility: CLINIC | Age: 38
End: 2023-11-01

## 2023-11-02 ENCOUNTER — MYC MEDICAL ADVICE (OUTPATIENT)
Dept: OBGYN | Facility: CLINIC | Age: 38
End: 2023-11-02

## 2023-11-02 DIAGNOSIS — O09.523 MULTIGRAVIDA OF ADVANCED MATERNAL AGE IN THIRD TRIMESTER: Primary | ICD-10-CM

## 2023-11-02 NOTE — TELEPHONE ENCOUNTER
"Per 10/13/23 OV notes:  \"She will need a fetal growth US at 34 weeks and prefers that this be done here rather than through the MFM office.  She will also need weekly BPPs starting at 34 weeks gestation\"  "

## 2023-11-02 NOTE — TELEPHONE ENCOUNTER
Pt last seen 10/13/2023 for prenatal, currently 34w5d- needing weekly BPPs RN notes previous orders are pended but not signed.    RN routing to provider to advise on BPP orders so pt can schedule    Pt has prenatal on 11/3.    Aline Otto RN on 11/2/2023 at 12:25 PM

## 2023-11-03 ENCOUNTER — PRENATAL OFFICE VISIT (OUTPATIENT)
Dept: OBGYN | Facility: CLINIC | Age: 38
End: 2023-11-03
Payer: COMMERCIAL

## 2023-11-03 VITALS — SYSTOLIC BLOOD PRESSURE: 121 MMHG | BODY MASS INDEX: 46.24 KG/M2 | WEIGHT: 293 LBS | DIASTOLIC BLOOD PRESSURE: 80 MMHG

## 2023-11-03 DIAGNOSIS — Z87.59 HISTORY OF GESTATIONAL HYPERTENSION: ICD-10-CM

## 2023-11-03 DIAGNOSIS — O09.523 MULTIGRAVIDA OF ADVANCED MATERNAL AGE IN THIRD TRIMESTER: Primary | ICD-10-CM

## 2023-11-03 DIAGNOSIS — O99.210 OBESITY IN PREGNANCY: ICD-10-CM

## 2023-11-03 PROCEDURE — 99207 PR PRENATAL VISIT: CPT | Performed by: OBSTETRICS & GYNECOLOGY

## 2023-11-03 NOTE — PATIENT INSTRUCTIONS
If you have labs or imaging done, the results will automatically release in [employer] without an interpretation.  Your health care professional will review those results and send an interpretation with recommendations as soon as possible, but this may be 1-3 business days.    If you have any questions regarding your visit, please contact your care team.     [employer]: [phone]  [facility] CLINIC HOURS TELEPHONE NUMBER       MD Lesa Thompson - Certified Medical Assistant     Aline- LEAD RN  Cindy-MAO Arciniega-MAO Pierson-  Michelle-     Monday- [place]  8:00 a.m - 5:00 p.m    Tuesday- Surgery        Thursday- [place]  8:00 a.m - 5:00 p.m.    Friday- [place]  7:30 a.m - 4:00 p.m. [facility]  [address]  [place], [place] [address] [phone] Fax  [phone] Phone  Imaging Scheduling [phone]    [facility] Delivery  [address] [place] [address] [address].  [place], [place] [address] [phone]    [facility]  [address] [place], [place] [id number] [phone] Phone  [phone] Fax  Imaging Scheduling [phone]     Urgent Care locations:  [facility] Monday-Friday  10 am - 8 pm  Saturday and Sunday   9 am - 5 pm  Monday-Friday   10 am - 8 pm  Saturday and Sunday   9 am - 5 pm   [phone]     **Surgeries** Our Surgery Schedulers will contact you to schedule. If you do not receive a call within 3 business days, please call [phone].    If you need a medication refill, please contact your pharmacy. Please allow 3 business days for your refill to be completed.    As always, thank you for trusting us with your healthcare needs!

## 2023-11-06 ENCOUNTER — ANCILLARY PROCEDURE (OUTPATIENT)
Dept: ULTRASOUND IMAGING | Facility: CLINIC | Age: 38
End: 2023-11-06
Attending: OBSTETRICS & GYNECOLOGY
Payer: COMMERCIAL

## 2023-11-06 DIAGNOSIS — O99.210 OBESITY IN PREGNANCY: ICD-10-CM

## 2023-11-06 DIAGNOSIS — O09.523 MULTIGRAVIDA OF ADVANCED MATERNAL AGE IN THIRD TRIMESTER: ICD-10-CM

## 2023-11-06 PROCEDURE — 76819 FETAL BIOPHYS PROFIL W/O NST: CPT

## 2023-11-06 PROCEDURE — 76816 OB US FOLLOW-UP PER FETUS: CPT

## 2023-11-07 NOTE — RESULT ENCOUNTER NOTE
Hi,    The ultrasound shows a normal biophysical profile (8/8).  The baby's measurements are within normal limits.  Please note that the due date does not change.   We will continue to monitor the baby's growth with another ultrasound in about 3-4 weeks if you have not yet delivered.      Thanks!  Dr. Ocasio

## 2023-11-09 ENCOUNTER — MYC MEDICAL ADVICE (OUTPATIENT)
Dept: OBGYN | Facility: CLINIC | Age: 38
End: 2023-11-09
Payer: COMMERCIAL

## 2023-11-13 NOTE — PROGRESS NOTES
Presents for routine  appointment.     US 23:  EFW 82%, AC 86%  BPPs ordered but not yet scheduled.     Left hand numb.  Increased swelling.  Symptoms worse in the am and better later in the day.  No leaking fluid, no contractions, no vaginal bleeding   ROS:   and GI  negative.     Please see Prenatal Vitals and Notes Flowsheet for objective data.  /81   Wt 138.3 kg (305 lb)   LMP 2023   BMI 46.38 kg/m    BSUS confirms cephalic presentation    A/P:  38 year old  at 36w5d       ICD-10-CM    1. Multigravida of advanced maternal age in third trimester  O09.523 Group B strep PCR     RSV VACCINE (ABRYSVO)          Group B Strep collected today  Discussed labor and what to expect. Discussed when to go to the birth center.  We recommend a single dose of maternal RSV vaccination between 32w 0d and 36w 6d to help prevent RSV infection in infants.  This will provide infant passive immunity for the first 6 months after birth .  She would like this today   Continue zoloft  low dose aspirin for preeclampsia risk reduction   Weekly BPPs - encouraged her to schedule  Discussed fetal movement.  Offered NST if she has concerns - she will continue to monitor and let us know if she has concerns.   Plan wrist splints for carpal tunnel syndrome.  May consider OT referral if needed in the future or referral to surgeon after delivery if symptoms do not improve.   Follow up in 1 week.      Roseline Ocasio MD

## 2023-11-15 ENCOUNTER — MYC MEDICAL ADVICE (OUTPATIENT)
Dept: OBGYN | Facility: OTHER | Age: 38
End: 2023-11-15
Payer: COMMERCIAL

## 2023-11-15 NOTE — TELEPHONE ENCOUNTER
36w4d      C/O mild (3/10) right upper abdominal pain that comes and goes.     Pain began yesterday, mild, comes and goes, nausea, no fever, pt reports home /77.  Denies any new or increased swelling, denies vision changes, denies headache.    Patient has appt schedule with OB/GYN provider tomorrow.    Advised, ok to be seen tomorrow, increase fluids, pelvic rest, rest periodically X 30 mins throughout the day, monitor kick counts, and continue to monitor symptoms- if any new or worse symptoms call triage nurse.   Sent info on remedies for nausea/vomiting in pregnancy.

## 2023-11-16 ENCOUNTER — PRENATAL OFFICE VISIT (OUTPATIENT)
Dept: OBGYN | Facility: OTHER | Age: 38
End: 2023-11-16
Payer: COMMERCIAL

## 2023-11-16 VITALS — WEIGHT: 293 LBS | SYSTOLIC BLOOD PRESSURE: 125 MMHG | BODY MASS INDEX: 46.38 KG/M2 | DIASTOLIC BLOOD PRESSURE: 81 MMHG

## 2023-11-16 DIAGNOSIS — O09.523 MULTIGRAVIDA OF ADVANCED MATERNAL AGE IN THIRD TRIMESTER: Primary | ICD-10-CM

## 2023-11-16 PROCEDURE — 99207 PR PRENATAL VISIT: CPT | Performed by: OBSTETRICS & GYNECOLOGY

## 2023-11-16 PROCEDURE — 87653 STREP B DNA AMP PROBE: CPT | Performed by: OBSTETRICS & GYNECOLOGY

## 2023-11-16 PROCEDURE — 90471 IMMUNIZATION ADMIN: CPT | Performed by: OBSTETRICS & GYNECOLOGY

## 2023-11-16 PROCEDURE — 90678 RSV VACC PREF BIVALENT IM: CPT | Performed by: OBSTETRICS & GYNECOLOGY

## 2023-11-16 NOTE — PATIENT INSTRUCTIONS
If you have labs or imaging done, the results will automatically release in [place] without an interpretation.  Your health care professional will review those results and send an interpretation with recommendations as soon as possible, but this may be 1-3 business days.    If you have any questions regarding your visit, please contact your care team.     [employer] Access [employer]: [phone]  [facility] CLINIC HOURS TELEPHONE NUMBER       MD Lesa Thompson - Certified Medical Assistant     Aline- LEAD RN  Cindy-MAO Arciniega-MAO Pierson-  Michelle-     Monday- [place]  8:00 a.m - 5:00 p.m    Tuesday- Surgery        Thursday- [place]  8:00 a.m - 5:00 p.m.    Friday- [place]  7:30 a.m - 4:00 p.m. [facility]  [address]  [place], [place] [address] [phone] Fax  [phone] Phone  Imaging Scheduling [phone]    [facility] Labor and Delivery  [address] [place] [address] Dr.  [place], [place] [address] [phone]    [facility]  [address] [place], [place] [id number] [phone] Phone  [phone] Fax  Imaging Scheduling [phone]     Urgent Care locations:  [facility] Monday-Friday  10 am - 8 pm  Saturday and Sunday   9 am - 5 pm  Monday-Friday   10 am - 8 pm  Saturday and Sunday   9 am - 5 pm   [phone]     **Surgeries** Our Surgery Schedulers will contact you to schedule. If you do not receive a call within 3 business days, please call [phone].    If you need a medication refill, please contact your pharmacy. Please allow 3 business days for your refill to be completed.    As always, thank you for trusting us with your healthcare needs!

## 2023-11-17 LAB — GP B STREP DNA SPEC QL NAA+PROBE: NEGATIVE

## 2023-11-27 ENCOUNTER — PRENATAL OFFICE VISIT (OUTPATIENT)
Dept: OBGYN | Facility: CLINIC | Age: 38
End: 2023-11-27
Payer: COMMERCIAL

## 2023-11-27 VITALS — SYSTOLIC BLOOD PRESSURE: 120 MMHG | DIASTOLIC BLOOD PRESSURE: 84 MMHG | BODY MASS INDEX: 47.33 KG/M2 | WEIGHT: 293 LBS

## 2023-11-27 DIAGNOSIS — Z87.59 HISTORY OF GESTATIONAL HYPERTENSION: ICD-10-CM

## 2023-11-27 DIAGNOSIS — O99.210 OBESITY IN PREGNANCY: ICD-10-CM

## 2023-11-27 DIAGNOSIS — G56.02 CARPAL TUNNEL SYNDROME OF LEFT WRIST: ICD-10-CM

## 2023-11-27 DIAGNOSIS — O09.523 MULTIGRAVIDA OF ADVANCED MATERNAL AGE IN THIRD TRIMESTER: Primary | ICD-10-CM

## 2023-11-27 PROCEDURE — 99207 PR PRENATAL VISIT: CPT | Performed by: OBSTETRICS & GYNECOLOGY

## 2023-11-27 NOTE — PATIENT INSTRUCTIONS
If you have labs or imaging done, the results will automatically release in [employer] without an interpretation.  Your health care professional will review those results and send an interpretation with recommendations as soon as possible, but this may be 1-3 business days.    If you have any questions regarding your visit, please contact your care team.     [employer] Access Services: [phone]  [facility] CLINIC HOURS TELEPHONE NUMBER       MD Lesa Thompson - Certified Medical Assistant     Aline- LEAD RN  Cindy-MAO Arciniega-MAO Pierson-  Michelle-     Monday- [place]  8:00 a.m - 5:00 p.m    Tuesday- Surgery        Thursday- [place]  8:00 a.m - 5:00 p.m.    Friday- [place]  7:30 a.m - 4:00 p.m. [facility]  [address]  [place], [place] [address] [phone] Fax  [phone] Phone  Imaging Scheduling [phone]    [facility] Labor and Delivery  [address] [place] [address] Dr.  [place], [place] [address] [phone]    [facility]  [address] [place], [place] [id number] [phone] Phone  [phone] Fax  Imaging Scheduling [phone]     Urgent Care locations:  [facility] Monday-Friday  10 am - 8 pm  Saturday and Sunday   9 am - 5 pm  Monday-Friday   10 am - 8 pm  Saturday and Sunday   9 am - 5 pm   [phone]     **Surgeries** Our Surgery Schedulers will contact you to schedule. If you do not receive a call within 3 business days, please call [phone].    If you need a medication refill, please contact your pharmacy. Please allow 3 business days for your refill to be completed.    As always, thank you for trusting us with your healthcare needs!

## 2023-11-27 NOTE — PROGRESS NOTES
Presents for routine  appointment.    Continues to have problems with carpal tunnel  No leaking fluid, no contractions, no vaginal bleeding   ROS:   and GI negative.     Please see Prenatal Vitals and Notes Flowsheet for objective data.  /84   Wt 141.2 kg (311 lb 4.8 oz)   LMP 2023   BMI 47.33 kg/m       A/P:  38 year old  at 38w2d       ICD-10-CM    1. Carpal tunnel syndrome of left wrist  G56.02 Occupational Therapy Referral        Labor precautions given   Cont Zoloft  Group B Strep neg  Low dose aspirin for preeclampsia risk reduction   Weekly BPPs - scheduled for tomorrow AM   CE 0.5/50/-3, mid, medium  Discussed IOL due to BMI >40 in pregnancy.  This is recommended at 39 weeks, Level IV.  Cervical ripening discussed. She had cervidil last pregnancy.  We may also consider dilapan depending on what is available this weekend.  All questions answered and she agrees to proceed. She will be 39 weeks Saturday the .        Roseline Ocasio MD

## 2023-11-28 ENCOUNTER — MYC MEDICAL ADVICE (OUTPATIENT)
Dept: OBGYN | Facility: CLINIC | Age: 38
End: 2023-11-28

## 2023-11-28 NOTE — TELEPHONE ENCOUNTER
"Per 11/27 OV notes:  \"Discussed IOL due to BMI >40 in pregnancy.  This is recommended at 39 weeks, Level IV.  Cervical ripening discussed\"  "

## 2023-11-30 DIAGNOSIS — F41.9 ANXIETY: ICD-10-CM

## 2023-12-11 ENCOUNTER — NURSE TRIAGE (OUTPATIENT)
Dept: NURSING | Facility: CLINIC | Age: 38
End: 2023-12-11

## 2023-12-12 ENCOUNTER — MYC REFILL (OUTPATIENT)
Dept: FAMILY MEDICINE | Facility: CLINIC | Age: 38
End: 2023-12-12
Payer: COMMERCIAL

## 2023-12-12 DIAGNOSIS — F41.9 ANXIETY: ICD-10-CM

## 2023-12-12 NOTE — TELEPHONE ENCOUNTER
Caller:   patient    Situation:   Postpartum x 1 week    Elevated BP reading  150s/80s today    BP: 162/88  Mild headache, goes away w/ Tylenol      Vaginal delivery  Hand and feet swelling    Left hand is numb and tingling - known since 3rd semester          Background:  OB is Roseline Ocasio      Assessment:  Consulted w/ on-call OB/GYN who says patient should be seen in the ED        Recommendation:  Disposition: go to ED    Reviewed care advise with caller.   Informed to call back w/ any questions or new concerns.    Patient verbalized understanding of care advice.        Lexii Looney RN, BSN  Triage Nurse Advisor      Reason for Disposition   Systolic BP >= 160 OR Diastolic >= 110    Additional Information   Negative: Difficult to awaken or acting confused (e.g., disoriented, slurred speech)   Negative: SEVERE difficulty breathing (e.g., struggling for each breath, speaks in single words)   Negative: [1] Weakness of the face, arm or leg on one side of the body AND [2] new-onset   Negative: [1] Numbness (i.e., loss of sensation) of the face, arm or leg on one side of the body AND [2] new- onset   Negative: Seizure occurred and has stopped   Negative: Sounds like a life-threatening emergency to the triager   Negative: New hand or face swelling    Protocols used: Postpartum - High Blood Pressure-A-AH

## 2024-03-07 ENCOUNTER — VIRTUAL VISIT (OUTPATIENT)
Dept: FAMILY MEDICINE | Facility: CLINIC | Age: 39
End: 2024-03-07
Payer: COMMERCIAL

## 2024-03-07 DIAGNOSIS — R09.A2 GLOBUS SENSATION: ICD-10-CM

## 2024-03-07 DIAGNOSIS — R13.19 ESOPHAGEAL DYSPHAGIA: Primary | ICD-10-CM

## 2024-03-07 PROCEDURE — 99443 PR PHYSICIAN TELEPHONE EVALUATION 21-30 MIN: CPT | Mod: 95 | Performed by: FAMILY MEDICINE

## 2024-03-07 RX ORDER — NIFEDIPINE 30 MG
30 TABLET, EXTENDED RELEASE ORAL DAILY
COMMUNITY
Start: 2023-12-14 | End: 2024-03-13

## 2024-03-07 ASSESSMENT — ANXIETY QUESTIONNAIRES
3. WORRYING TOO MUCH ABOUT DIFFERENT THINGS: NOT AT ALL
GAD7 TOTAL SCORE: 0
6. BECOMING EASILY ANNOYED OR IRRITABLE: NOT AT ALL
5. BEING SO RESTLESS THAT IT IS HARD TO SIT STILL: NOT AT ALL
1. FEELING NERVOUS, ANXIOUS, OR ON EDGE: NOT AT ALL
4. TROUBLE RELAXING: NOT AT ALL
7. FEELING AFRAID AS IF SOMETHING AWFUL MIGHT HAPPEN: NOT AT ALL
IF YOU CHECKED OFF ANY PROBLEMS ON THIS QUESTIONNAIRE, HOW DIFFICULT HAVE THESE PROBLEMS MADE IT FOR YOU TO DO YOUR WORK, TAKE CARE OF THINGS AT HOME, OR GET ALONG WITH OTHER PEOPLE: NOT DIFFICULT AT ALL
2. NOT BEING ABLE TO STOP OR CONTROL WORRYING: NOT AT ALL
8. IF YOU CHECKED OFF ANY PROBLEMS, HOW DIFFICULT HAVE THESE MADE IT FOR YOU TO DO YOUR WORK, TAKE CARE OF THINGS AT HOME, OR GET ALONG WITH OTHER PEOPLE?: NOT DIFFICULT AT ALL
7. FEELING AFRAID AS IF SOMETHING AWFUL MIGHT HAPPEN: NOT AT ALL
GAD7 TOTAL SCORE: 0

## 2024-03-07 NOTE — PROGRESS NOTES
"Kelsea is a 38 year old who is being evaluated via a billable video visit.      How would you like to obtain your AVS? MyChart  If the video visit is dropped, the invitation should be resent by: Other e-mail:    Will anyone else be joining your video visit? No      Visit switched to Telephone due to technical difficulties.    Assessment & Plan     Esophageal dysphagia  - US Head Neck Soft Tissue; Future  - XR Esophagram w Upper GI; Future    Globus sensation  - US Head Neck Soft Tissue; Future  - XR Esophagram w Upper GI; Future      BMI  Estimated body mass index is 47.33 kg/m  as calculated from the following:    Height as of 6/9/23: 1.727 m (5' 8\").    Weight as of 11/27/23: 141.2 kg (311 lb 4.8 oz).       Subjective   Kelsea is a 38 year old, presenting for the following health issues:  No chief complaint on file.  Dysphagia  Patient presents with dysphagia.It has been of a  gradual onset, located in the high throat, and symptoms lasting 3 minutes. Patient reports nothing has become stuck, but feels a lump/\"crackling\" sensation when swallowing. Associated with the dysphagia, patient denies odynophagia, dysphonia, hoarseness, post nasal drainage, hemoptysis, and aspiration. She has a history of GERD and is 2 months post partum but not on PPI due to improvement in symptoms with dietary changes.      HPI       Upper EUS 7/2019  Post-op Diagnoses:        - Normal esophagus.        - Normal stomach. Biopsied.        - Normal examined duodenum. Biopsied.        - EUS:        - Pancreatic parenchymal abnormalities consisting of fatty pancreas. No        mass or residual changes of acute pancreatitis. No chronic pancreatitis.        - There was no sign of significant pathology in the main pancreatic duct.        - There was no sign of significant pathology in the common bile duct,        ampulla and in the gallbladder.        - There was no evidence of significant pathology in the left lobe of the        liver and " celiac axis.        - Splenule   Objective           Vitals:  No vitals were obtained today due to virtual visit.    Physical Exam   GENERAL: alert and no distress  PSYCH: Appropriate affect, tone, and pace of words          Telephone visit 21 minutes.

## 2024-03-13 ENCOUNTER — MYC MEDICAL ADVICE (OUTPATIENT)
Dept: OBGYN | Facility: CLINIC | Age: 39
End: 2024-03-13
Payer: COMMERCIAL

## 2024-03-13 ENCOUNTER — MYC REFILL (OUTPATIENT)
Dept: FAMILY MEDICINE | Facility: CLINIC | Age: 39
End: 2024-03-13
Payer: COMMERCIAL

## 2024-03-13 DIAGNOSIS — Z87.59 HISTORY OF GESTATIONAL HYPERTENSION: Primary | ICD-10-CM

## 2024-03-13 DIAGNOSIS — F41.9 ANXIETY: ICD-10-CM

## 2024-03-13 RX ORDER — NIFEDIPINE 30 MG
30 TABLET, EXTENDED RELEASE ORAL DAILY
Qty: 60 TABLET | Refills: 0 | Status: CANCELLED | OUTPATIENT
Start: 2024-03-13

## 2024-03-13 RX ORDER — NIFEDIPINE 30 MG
30 TABLET, EXTENDED RELEASE ORAL DAILY
Qty: 60 TABLET | Refills: 0 | Status: SHIPPED | OUTPATIENT
Start: 2024-03-13 | End: 2024-06-11

## 2024-03-13 NOTE — TELEPHONE ENCOUNTER
I would continue medication for now but plan for in clinic blood pressure check and physical either with OB or me.

## 2024-03-13 NOTE — TELEPHONE ENCOUNTER
"RN Triage    Patient Contact    Attempt # 1    Was call answered?  No.  Left message on voicemail with information to call me back.    Upon patient callback please advise of the below from provider and help schedule requested visit below. Message in reference to refill request for Nifedipine.    \"I would continue medication for now but plan for in clinic blood pressure check and physical either with OB or me.\"    MCKENNA Marcelo, RN  [facility] ~ Registered Nurse  Clinic Triage ~ [place] [facility] [place]  March 13, 2024           "

## 2024-03-13 NOTE — TELEPHONE ENCOUNTER
VORB by lina Barrios to stop nifedipine, monitor blood pressures for the next few days.  Canceling the T'd up nifedipine.   Informed patient of provider's recommendations.   Call clinic at [phone] if any new or worsening symptoms and reiterated s/s of pre-eclampsia.    Suze HODGSON RN

## 2024-03-13 NOTE — TELEPHONE ENCOUNTER
Marilu from patient stating she will be out of BP meds today    Normal Vaginal delivery 12/03/2023  Admitted to [facility] 12/11/2023 for post partum pre-eclampsia.  History of post partum pre-eclampsia successful treated with nifedipine ER 30 mg daily.     Reports BP's and s/s of pre-eclampsia controlled well with current medication of Nifedipine ER 30 mg daily   Next  post partum OV 4/16/2024  Will be out of her BP medication today.     Med T'd up, Patient uses [facility] in [place]  Advised to call with any new or worsening symptoms.  Will route to refill request to provider and also any further recommendations.       Suze HODSGON RN

## 2024-03-15 NOTE — TELEPHONE ENCOUNTER
RN was going to reach out to patient again regarding the below recommendations by provider. RN reviewed patient chart, upon review RN noted that Dr. Ocasio stopped Nifedipine as of 3/13/24.     Please see MyChart encounter 3/13/24 for further information.     Please advise if you would like nursing to reach out to patient with any other recommendations.     BERNADETTE MarceloN, RN  [facility] ~ Registered Nurse  Clinic Triage ~ [place] [facility]  March 15, 2024

## 2024-04-16 ENCOUNTER — MEDICAL CORRESPONDENCE (OUTPATIENT)
Dept: HEALTH INFORMATION MANAGEMENT | Facility: CLINIC | Age: 39
End: 2024-04-16
Payer: COMMERCIAL

## 2024-06-04 ENCOUNTER — MEDICAL CORRESPONDENCE (OUTPATIENT)
Dept: HEALTH INFORMATION MANAGEMENT | Facility: CLINIC | Age: 39
End: 2024-06-04
Payer: COMMERCIAL

## 2024-06-26 ENCOUNTER — MYC REFILL (OUTPATIENT)
Dept: FAMILY MEDICINE | Facility: CLINIC | Age: 39
End: 2024-06-26
Payer: COMMERCIAL

## 2024-06-26 DIAGNOSIS — F41.9 ANXIETY: ICD-10-CM

## 2024-07-13 ENCOUNTER — HEALTH MAINTENANCE LETTER (OUTPATIENT)
Age: 39
End: 2024-07-13

## 2024-10-19 ENCOUNTER — MYC REFILL (OUTPATIENT)
Dept: FAMILY MEDICINE | Facility: CLINIC | Age: 39
End: 2024-10-19
Payer: COMMERCIAL

## 2024-10-19 DIAGNOSIS — F41.9 ANXIETY: ICD-10-CM

## 2025-04-09 ENCOUNTER — E-VISIT (OUTPATIENT)
Dept: FAMILY MEDICINE | Facility: CLINIC | Age: 40
End: 2025-04-09
Payer: COMMERCIAL

## 2025-04-09 DIAGNOSIS — N61.0 ACUTE MASTITIS OF RIGHT BREAST: Primary | ICD-10-CM

## 2025-04-09 DIAGNOSIS — N61.0 MASTITIS: Primary | ICD-10-CM

## 2025-04-09 RX ORDER — CEPHALEXIN 500 MG/1
500 CAPSULE ORAL 4 TIMES DAILY
Qty: 28 CAPSULE | Refills: 0 | Status: SHIPPED | OUTPATIENT
Start: 2025-04-09 | End: 2025-04-16

## 2025-04-09 RX ORDER — CEPHALEXIN 500 MG/1
500 CAPSULE ORAL 3 TIMES DAILY
Qty: 21 CAPSULE | Refills: 0 | Status: SHIPPED | OUTPATIENT
Start: 2025-04-09 | End: 2025-04-09

## 2025-05-22 ENCOUNTER — VIRTUAL VISIT (OUTPATIENT)
Dept: FAMILY MEDICINE | Facility: CLINIC | Age: 40
End: 2025-05-22
Payer: COMMERCIAL

## 2025-05-22 DIAGNOSIS — Z13.220 LIPID SCREENING: Primary | ICD-10-CM

## 2025-05-22 DIAGNOSIS — F41.9 ANXIETY: ICD-10-CM

## 2025-05-22 PROBLEM — E66.01 OBESITY, MORBID, BMI 40.0-49.9 (H): Status: RESOLVED | Noted: 2018-01-15 | Resolved: 2025-05-22

## 2025-05-22 PROBLEM — O99.210 OBESITY IN PREGNANCY: Status: RESOLVED | Noted: 2023-11-27 | Resolved: 2025-05-22

## 2025-05-22 PROBLEM — O09.529 ANTEPARTUM MULTIGRAVIDA OF ADVANCED MATERNAL AGE: Status: RESOLVED | Noted: 2023-06-09 | Resolved: 2025-05-22

## 2025-05-22 RX ORDER — BUPROPION HYDROCHLORIDE 150 MG/1
150 TABLET ORAL EVERY MORNING
Qty: 90 TABLET | Refills: 1 | Status: SHIPPED | OUTPATIENT
Start: 2025-05-22

## 2025-05-22 ASSESSMENT — ANXIETY QUESTIONNAIRES
7. FEELING AFRAID AS IF SOMETHING AWFUL MIGHT HAPPEN: NOT AT ALL
IF YOU CHECKED OFF ANY PROBLEMS ON THIS QUESTIONNAIRE, HOW DIFFICULT HAVE THESE PROBLEMS MADE IT FOR YOU TO DO YOUR WORK, TAKE CARE OF THINGS AT HOME, OR GET ALONG WITH OTHER PEOPLE: NOT DIFFICULT AT ALL
GAD7 TOTAL SCORE: 3
2. NOT BEING ABLE TO STOP OR CONTROL WORRYING: NOT AT ALL
5. BEING SO RESTLESS THAT IT IS HARD TO SIT STILL: NOT AT ALL
4. TROUBLE RELAXING: NOT AT ALL
1. FEELING NERVOUS, ANXIOUS, OR ON EDGE: SEVERAL DAYS
7. FEELING AFRAID AS IF SOMETHING AWFUL MIGHT HAPPEN: NOT AT ALL
GAD7 TOTAL SCORE: 3
3. WORRYING TOO MUCH ABOUT DIFFERENT THINGS: SEVERAL DAYS
6. BECOMING EASILY ANNOYED OR IRRITABLE: SEVERAL DAYS
8. IF YOU CHECKED OFF ANY PROBLEMS, HOW DIFFICULT HAVE THESE MADE IT FOR YOU TO DO YOUR WORK, TAKE CARE OF THINGS AT HOME, OR GET ALONG WITH OTHER PEOPLE?: NOT DIFFICULT AT ALL
GAD7 TOTAL SCORE: 3

## 2025-05-22 ASSESSMENT — ENCOUNTER SYMPTOMS: NERVOUS/ANXIOUS: 1

## 2025-05-22 ASSESSMENT — PATIENT HEALTH QUESTIONNAIRE - PHQ9
SUM OF ALL RESPONSES TO PHQ QUESTIONS 1-9: 4
10. IF YOU CHECKED OFF ANY PROBLEMS, HOW DIFFICULT HAVE THESE PROBLEMS MADE IT FOR YOU TO DO YOUR WORK, TAKE CARE OF THINGS AT HOME, OR GET ALONG WITH OTHER PEOPLE: NOT DIFFICULT AT ALL
SUM OF ALL RESPONSES TO PHQ QUESTIONS 1-9: 4

## 2025-05-22 NOTE — PROGRESS NOTES
Kelsea is a 39 year old who is being evaluated via a billable video visit.      How would you like to obtain your AVS? [place]  If the video visit is dropped, the invitation should be resent by: Text to cell phone: [phone]  Will anyone else be joining your video visit? No        Assessment & Plan     Anxiety  The patient presents for follow-up of generalized anxiety disorder. She is currently on sertraline 50 mg daily but reports worsening anxiety symptoms and occasional panic attacks. She recently returned to graduate school, pursuing a degree in public health with a minor in informatics. She is expected to graduate in a few weeks and is currently  working on her thesis, which has already been outlines.   Given her academic demands and preference to avoid medications with sedative effects, we discussed augmenting her current regimen with bupropion, which may help address residual anxiety without impairing cognition or focus. Plan to reassess in 4-6 weeks.  Verified no suicidal thoughts or ideation at this time.  Supportive measures and coping strategies were reviewed.   - sertraline (ZOLOFT) 50 MG tablet; Take 1 tablet (50 mg) by mouth daily. Please follow up for next refill.  - buPROPion (WELLBUTRIN XL) 150 MG 24 hr tablet; Take 1 tablet (150 mg) by mouth every morning.  - Comprehensive metabolic panel; Future  - Vitamin D Deficiency; Future  - TSH with free T4 reflex; Future  - CBC with platelets and differential; Future    Lipid screening  - Lipid panel reflex to direct LDL Fasting; Future          Subjective   Kelsea is a 39 year old, presenting for the following health issues:  Anxiety      5/22/2025    11:37 AM   Additional Questions   Roomed by Completed on [place]     Anxiety    History of Present Illness       Mental Health Follow-up:  Patient presents to follow-up on Anxiety.    Patient's anxiety since last visit has been:  Medium  The patient is not having other symptoms associated with  anxiety.  Any significant life events: relationship concerns, job concerns and financial concerns  Patient is not feeling anxious or having panic attacks.  Patient has no concerns about alcohol or drug use.             12/20/2019    12:34 PM 1/24/2020     2:30 PM 5/22/2025    11:33 AM   PHQ   PHQ-9 Total Score 0 2 4    Q9: Thoughts of better off dead/self-harm past 2 weeks Not at all  Not at all Not at all       Patient-reported    Data saved with a previous flowsheet row definition           1/13/2022     7:57 PM 3/7/2024    10:32 AM 5/22/2025    11:34 AM   BETTY-7 SCORE   Total Score 15 (severe anxiety) 0 (minimal anxiety) 3 (minimal anxiety)   Total Score 15 0    0    0 3        Patient-reported             Review of Systems  Constitutional, HEENT, cardiovascular, pulmonary, GI, , musculoskeletal, neuro, skin, endocrine and psych systems are negative, except as otherwise noted.      Objective           Vitals:  No vitals were obtained today due to virtual visit.    Physical Exam   GENERAL: alert and no distress  EYES: Eyes grossly normal to inspection.  No discharge or erythema, or obvious scleral/conjunctival abnormalities.  RESP: No audible wheeze, cough, or visible cyanosis.    SKIN: Visible skin clear. No significant rash, abnormal pigmentation or lesions.  NEURO: Cranial nerves grossly intact.  Mentation and speech appropriate for age.  PSYCH: mentation appears normal, tearful, and judgement and insight intact      Video-Visit Details    Type of service:  Video Visit   Originating Location (pt. Location): Home  Distant Location (provider location):  Off-site  Platform used for Video Visit: Sally  Signed Electronically by: Jordi Arriaza MD

## 2025-07-03 ENCOUNTER — PATIENT OUTREACH (OUTPATIENT)
Dept: CARE COORDINATION | Facility: CLINIC | Age: 40
End: 2025-07-03
Payer: COMMERCIAL

## 2025-07-19 ENCOUNTER — HEALTH MAINTENANCE LETTER (OUTPATIENT)
Age: 40
End: 2025-07-19

## 2025-07-27 ENCOUNTER — E-VISIT (OUTPATIENT)
Dept: URGENT CARE | Facility: CLINIC | Age: 40
End: 2025-07-27
Payer: COMMERCIAL

## 2025-07-27 DIAGNOSIS — T78.40XS ALLERGIC REACTION, SEQUELA: Primary | ICD-10-CM

## 2025-07-27 RX ORDER — PREDNISONE 20 MG/1
TABLET ORAL
Qty: 10 TABLET | Refills: 0 | Status: SHIPPED | OUTPATIENT
Start: 2025-07-27

## 2025-07-27 NOTE — PATIENT INSTRUCTIONS
Thank you for choosing us for your care. I have placed an order for a prescription so that you can start treatment:  Orders Placed This Encounter   Medications     predniSONE (DELTASONE) 20 MG tablet     Sig: Take 2 tabs daily x 5 days     Dispense:  10 tablet     Refill:  0      You can simply monitor with Benadryl for now- but if symptoms worsen- this is a steroid prescription for treatment.    View your full visit summary for details by clicking on the link below. Your pharmacist will able to address any questions you may have about the medication.     If you're not feeling better within 5-7 days, please schedule an appointment.  You can schedule an appointment right here in [facility], or call [phone]  If the visit is for the same symptoms as your eVisit, we'll refund the cost of your eVisit if seen within seven days.

## (undated) DEVICE — KIT ENDO FIRST STEP DISINFECTANT 200ML W/POUCH EP-4

## (undated) DEVICE — PAD CHUX UNDERPAD 23X24" 7136

## (undated) DEVICE — PREP CHLORAPREP 26ML TINTED ORANGE  260815

## (undated) RX ORDER — FENTANYL CITRATE 50 UG/ML
INJECTION, SOLUTION INTRAMUSCULAR; INTRAVENOUS
Status: DISPENSED
Start: 2021-04-12